# Patient Record
Sex: FEMALE | Race: WHITE | NOT HISPANIC OR LATINO | Employment: FULL TIME | URBAN - METROPOLITAN AREA
[De-identification: names, ages, dates, MRNs, and addresses within clinical notes are randomized per-mention and may not be internally consistent; named-entity substitution may affect disease eponyms.]

---

## 2017-02-27 ENCOUNTER — OFFICE VISIT (OUTPATIENT)
Dept: FAMILY MEDICINE CLINIC | Facility: CLINIC | Age: 37
End: 2017-02-27

## 2017-02-27 VITALS
HEIGHT: 68 IN | HEART RATE: 88 BPM | WEIGHT: 293 LBS | BODY MASS INDEX: 44.41 KG/M2 | DIASTOLIC BLOOD PRESSURE: 110 MMHG | OXYGEN SATURATION: 95 % | SYSTOLIC BLOOD PRESSURE: 170 MMHG

## 2017-02-27 DIAGNOSIS — F41.9 ANXIETY: ICD-10-CM

## 2017-02-27 DIAGNOSIS — N94.6 DYSMENORRHEA: ICD-10-CM

## 2017-02-27 DIAGNOSIS — I10 ESSENTIAL HYPERTENSION: Primary | ICD-10-CM

## 2017-02-27 LAB
ALBUMIN SERPL-MCNC: 4.2 G/DL (ref 3.2–4.8)
ALBUMIN/GLOB SERPL: 1.5 G/DL (ref 1.5–2.5)
ALP SERPL-CCNC: 64 U/L (ref 25–100)
ALT SERPL W P-5'-P-CCNC: 9 U/L (ref 7–40)
ANION GAP SERPL CALCULATED.3IONS-SCNC: 7 MMOL/L (ref 3–11)
AST SERPL-CCNC: 12 U/L (ref 0–33)
BASOPHILS # BLD AUTO: 0.06 10*3/MM3 (ref 0–0.2)
BASOPHILS NFR BLD AUTO: 0.6 % (ref 0–1)
BILIRUB SERPL-MCNC: 0.2 MG/DL (ref 0.3–1.2)
BUN BLD-MCNC: 12 MG/DL (ref 9–23)
BUN/CREAT SERPL: 15 (ref 7–25)
CALCIUM SPEC-SCNC: 9.6 MG/DL (ref 8.7–10.4)
CHLORIDE SERPL-SCNC: 105 MMOL/L (ref 99–109)
CO2 SERPL-SCNC: 28 MMOL/L (ref 20–31)
CREAT BLD-MCNC: 0.8 MG/DL (ref 0.6–1.3)
DEPRECATED RDW RBC AUTO: 44.8 FL (ref 37–54)
EOSINOPHIL # BLD AUTO: 0.43 10*3/MM3 (ref 0.1–0.3)
EOSINOPHIL NFR BLD AUTO: 4.2 % (ref 0–3)
ERYTHROCYTE [DISTWIDTH] IN BLOOD BY AUTOMATED COUNT: 12.9 % (ref 11.3–14.5)
GFR SERPL CREATININE-BSD FRML MDRD: 81 ML/MIN/1.73
GLOBULIN UR ELPH-MCNC: 2.8 GM/DL
GLUCOSE BLD-MCNC: 81 MG/DL (ref 70–100)
HBA1C MFR BLD: 4.8 % (ref 4.8–5.6)
HCT VFR BLD AUTO: 41.9 % (ref 34.5–44)
HGB BLD-MCNC: 14.3 G/DL (ref 11.5–15.5)
IMM GRANULOCYTES # BLD: 0.02 10*3/MM3 (ref 0–0.03)
IMM GRANULOCYTES NFR BLD: 0.2 % (ref 0–0.6)
LYMPHOCYTES # BLD AUTO: 1.99 10*3/MM3 (ref 0.6–4.8)
LYMPHOCYTES NFR BLD AUTO: 19.5 % (ref 24–44)
MCH RBC QN AUTO: 32.6 PG (ref 27–31)
MCHC RBC AUTO-ENTMCNC: 34.1 G/DL (ref 32–36)
MCV RBC AUTO: 95.7 FL (ref 80–99)
MONOCYTES # BLD AUTO: 0.59 10*3/MM3 (ref 0–1)
MONOCYTES NFR BLD AUTO: 5.8 % (ref 0–12)
NEUTROPHILS # BLD AUTO: 7.14 10*3/MM3 (ref 1.5–8.3)
NEUTROPHILS NFR BLD AUTO: 69.7 % (ref 41–71)
PLATELET # BLD AUTO: 269 10*3/MM3 (ref 150–450)
PMV BLD AUTO: 9.2 FL (ref 6–12)
POTASSIUM BLD-SCNC: 4 MMOL/L (ref 3.5–5.5)
PROT SERPL-MCNC: 7 G/DL (ref 5.7–8.2)
RBC # BLD AUTO: 4.38 10*6/MM3 (ref 3.89–5.14)
SODIUM BLD-SCNC: 140 MMOL/L (ref 132–146)
TSH SERPL DL<=0.05 MIU/L-ACNC: 1.71 MIU/ML (ref 0.35–5.35)
URATE SERPL-MCNC: 5.3 MG/DL (ref 3.1–7.8)
WBC NRBC COR # BLD: 10.23 10*3/MM3 (ref 3.5–10.8)

## 2017-02-27 PROCEDURE — 80053 COMPREHEN METABOLIC PANEL: CPT | Performed by: FAMILY MEDICINE

## 2017-02-27 PROCEDURE — 85025 COMPLETE CBC W/AUTO DIFF WBC: CPT | Performed by: FAMILY MEDICINE

## 2017-02-27 PROCEDURE — 83036 HEMOGLOBIN GLYCOSYLATED A1C: CPT | Performed by: FAMILY MEDICINE

## 2017-02-27 PROCEDURE — 36415 COLL VENOUS BLD VENIPUNCTURE: CPT | Performed by: FAMILY MEDICINE

## 2017-02-27 PROCEDURE — 84443 ASSAY THYROID STIM HORMONE: CPT | Performed by: FAMILY MEDICINE

## 2017-02-27 PROCEDURE — 99214 OFFICE O/P EST MOD 30 MIN: CPT | Performed by: FAMILY MEDICINE

## 2017-02-27 PROCEDURE — 84550 ASSAY OF BLOOD/URIC ACID: CPT | Performed by: FAMILY MEDICINE

## 2017-02-27 RX ORDER — HYDROXYZINE PAMOATE 50 MG/1
50 CAPSULE ORAL 3 TIMES DAILY PRN
Qty: 30 CAPSULE | Refills: 1 | Status: SHIPPED | OUTPATIENT
Start: 2017-02-27 | End: 2017-04-02 | Stop reason: SDUPTHER

## 2017-02-27 RX ORDER — NIFEDIPINE 30 MG/1
30 TABLET, EXTENDED RELEASE ORAL DAILY
Qty: 90 TABLET | Refills: 1 | Status: SHIPPED | OUTPATIENT
Start: 2017-02-27 | End: 2017-03-27 | Stop reason: SDUPTHER

## 2017-02-27 RX ORDER — LOSARTAN POTASSIUM AND HYDROCHLOROTHIAZIDE 25; 100 MG/1; MG/1
1 TABLET ORAL DAILY
Qty: 90 TABLET | Refills: 2 | Status: SHIPPED | OUTPATIENT
Start: 2017-02-27 | End: 2017-12-06 | Stop reason: SDUPTHER

## 2017-02-27 NOTE — PROGRESS NOTES
Subjective   Starr Ruiz is a 36 y.o. female. Pt is here to due to being out of medications for the last 3 month atleast. Gyno switched pt blood pressure medication and she is not sure what medication she was takingt.     History of Present Illness Pt states that she feels anxious all the time.  She has had to leave multiple places due to inability to control emotions and SOB.  Feels out of control and agitated.  This is a daily occurrence and says she just wants to be left alone. Increase in emotional and family stressors.  Kids at the moment ages 12, 13, 15.  15 year old female just left [place] for behavioral issues and aggression toward mom.  Other with behavioral issues at school.  Sleeps 3-4 hours at night.   At least one headache 8/10 daily lasting approximately 2-3 hours.  Noise and light make it worse.  Tylenol 1000 mg does not help.  Has been off BP meds for 3 months.  Prev took losartan and eldon well.  Had cough with ace Inh.      Dr Giron performed Novasure Ablation in March 2016.  He was managing BP prior to surgery, but pt unable to fu there due to insurance coverage changing.  Vaginal bleeding has resolved.  Has taken hydrocodone, naproxen, and other left over medications from previous surgeries but has no medication left for pain control.  Feels like anxiety is worse during her cycle.  Smoking more frequently.  Has Ventolin inhaler she takes for occ SOB.  Weight gain due to overeating with stress.  No si/hi.     She also co lower abd crampin after ablation, however change in insurance and she can no longer fu with Valudis.  Would like new GYN.     The following portions of the patient's history were reviewed and updated as appropriate: allergies, current medications, past family history, past medical history, past social history, past surgical history and problem list.    Review of Systems   Constitutional: Positive for appetite change.   HENT: Negative.    Eyes: Negative.    Respiratory:  "Positive for shortness of breath.    Cardiovascular: Negative.  Negative for chest pain and palpitations.   Gastrointestinal: Negative.    Endocrine: Negative.    Genitourinary: Negative.    Musculoskeletal: Negative.    Skin: Negative.    Allergic/Immunologic: Negative.    Neurological: Negative.    Hematological: Negative.    Psychiatric/Behavioral: Positive for agitation and sleep disturbance. Negative for behavioral problems, confusion, dysphoric mood, self-injury and suicidal ideas. The patient is nervous/anxious.    All other systems reviewed and are negative.      Objective     Vitals:    02/27/17 1424   BP: (!) 170/110   Pulse: 88   SpO2: 95%   Weight: (!) 356 lb (161 kg)   Height: 68\" (172.7 cm)       Physical Exam   Constitutional: She is oriented to person, place, and time. She appears well-developed and well-nourished.   HENT:   Head: Normocephalic and atraumatic.   Eyes: EOM are normal. Pupils are equal, round, and reactive to light. Right eye exhibits no discharge. Left eye exhibits no discharge.   Neck: Normal range of motion. Neck supple.   Cardiovascular: Normal rate, regular rhythm, normal heart sounds and intact distal pulses.    Pulmonary/Chest: Effort normal and breath sounds normal.   Abdominal: Soft. Bowel sounds are normal. She exhibits no mass. There is no tenderness.   Musculoskeletal: Normal range of motion.        Right shoulder: She exhibits no swelling.   Neurological: She is alert and oriented to person, place, and time. She has normal reflexes.   Skin: Skin is warm and dry. No cyanosis. Nails show no clubbing.   Psychiatric: She has a normal mood and affect. Her behavior is normal. Judgment and thought content normal.       Assessment/Plan     Problem List Items Addressed This Visit        Cardiovascular and Mediastinum    Hypertension - Primary    Relevant Medications    losartan-hydrochlorothiazide (HYZAAR) 100-25 MG per tablet    NIFEdipine XL (PROCARDIA XL) 30 MG 24 hr tablet    " Other Relevant Orders    CBC & Differential    TSH    Comprehensive Metabolic Panel    Hemoglobin A1c    Uric Acid    CBC Auto Differential       Other    Anxiety    Relevant Medications    sertraline (ZOLOFT) 50 MG tablet    hydrOXYzine (VISTARIL) 50 MG capsule    Other Relevant Orders    TSH      Other Visit Diagnoses     Dysmenorrhea        Relevant Orders    Ambulatory Referral to Gynecology

## 2017-02-27 NOTE — PATIENT INSTRUCTIONS

## 2017-03-27 ENCOUNTER — OFFICE VISIT (OUTPATIENT)
Dept: FAMILY MEDICINE CLINIC | Facility: CLINIC | Age: 37
End: 2017-03-27

## 2017-03-27 VITALS
OXYGEN SATURATION: 97 % | BODY MASS INDEX: 44.41 KG/M2 | WEIGHT: 293 LBS | HEART RATE: 89 BPM | TEMPERATURE: 98.2 F | HEIGHT: 68 IN | DIASTOLIC BLOOD PRESSURE: 98 MMHG | SYSTOLIC BLOOD PRESSURE: 148 MMHG

## 2017-03-27 DIAGNOSIS — E66.01 MORBID OBESITY DUE TO EXCESS CALORIES (HCC): ICD-10-CM

## 2017-03-27 DIAGNOSIS — F41.9 ANXIETY: ICD-10-CM

## 2017-03-27 DIAGNOSIS — Z72.0 TOBACCO ABUSE: ICD-10-CM

## 2017-03-27 DIAGNOSIS — I10 ESSENTIAL HYPERTENSION: Primary | ICD-10-CM

## 2017-03-27 DIAGNOSIS — N94.6 DYSMENORRHEA: ICD-10-CM

## 2017-03-27 PROCEDURE — 99215 OFFICE O/P EST HI 40 MIN: CPT | Performed by: FAMILY MEDICINE

## 2017-03-27 PROCEDURE — 99406 BEHAV CHNG SMOKING 3-10 MIN: CPT | Performed by: FAMILY MEDICINE

## 2017-03-27 PROCEDURE — 90471 IMMUNIZATION ADMIN: CPT | Performed by: FAMILY MEDICINE

## 2017-03-27 PROCEDURE — 90732 PPSV23 VACC 2 YRS+ SUBQ/IM: CPT | Performed by: FAMILY MEDICINE

## 2017-03-27 PROCEDURE — 4040F PNEUMOC VAC/ADMIN/RCVD: CPT | Performed by: FAMILY MEDICINE

## 2017-03-27 RX ORDER — SERTRALINE HYDROCHLORIDE 100 MG/1
100 TABLET, FILM COATED ORAL DAILY
Qty: 90 TABLET | Refills: 3 | Status: SHIPPED | OUTPATIENT
Start: 2017-03-27 | End: 2017-12-27

## 2017-03-27 RX ORDER — NIFEDIPINE 60 MG/1
60 TABLET, FILM COATED, EXTENDED RELEASE ORAL DAILY
Qty: 90 TABLET | Refills: 3 | Status: SHIPPED | OUTPATIENT
Start: 2017-03-27 | End: 2017-12-27 | Stop reason: SDUPTHER

## 2017-03-27 RX ORDER — NAPROXEN 500 MG/1
500 TABLET ORAL 2 TIMES DAILY WITH MEALS
Qty: 60 TABLET | Refills: 2 | Status: SHIPPED | OUTPATIENT
Start: 2017-03-27 | End: 2018-10-31

## 2017-03-27 NOTE — PATIENT INSTRUCTIONS
Steps to Quit Smoking   Smoking tobacco can be harmful to your health and can affect almost every organ in your body. Smoking puts you, and those around you, at risk for developing many serious chronic diseases. Quitting smoking is difficult, but it is one of the best things that you can do for your health. It is never too late to quit.  WHAT ARE THE BENEFITS OF QUITTING SMOKING?  When you quit smoking, you lower your risk of developing serious diseases and conditions, such as:  · Lung cancer or lung disease, such as COPD.  · Heart disease.  · Stroke.  · Heart attack.  · Infertility.  · Osteoporosis and bone fractures.  Additionally, symptoms such as coughing, wheezing, and shortness of breath may get better when you quit. You may also find that you get sick less often because your body is stronger at fighting off colds and infections. If you are pregnant, quitting smoking can help to reduce your chances of having a baby of low birth weight.  HOW DO I GET READY TO QUIT?  When you decide to quit smoking, create a plan to make sure that you are successful. Before you quit:  · Pick a date to quit. Set a date within the next two weeks to give you time to prepare.  · Write down the reasons why you are quitting. Keep this list in places where you will see it often, such as on your bathroom mirror or in your car or wallet.  · Identify the people, places, things, and activities that make you want to smoke (triggers) and avoid them. Make sure to take these actions:    Throw away all cigarettes at home, at work, and in your car.    Throw away smoking accessories, such as ashtrays and lighters.    Clean your car and make sure to empty the ashtray.    Clean your home, including curtains and carpets.  · Tell your family, friends, and coworkers that you are quitting. Support from your loved ones can make quitting easier.  · Talk with your health care provider about your options for quitting smoking.  · Find out what treatment  "options are covered by your health insurance.  WHAT STRATEGIES CAN I USE TO QUIT SMOKING?   Talk with your healthcare provider about different strategies to quit smoking. Some strategies include:  · Quitting smoking altogether instead of gradually lessening how much you smoke over a period of time. Research shows that quitting \"cold turkey\" is more successful than gradually quitting.  · Attending in-person counseling to help you build problem-solving skills. You are more likely to have success in quitting if you attend several counseling sessions. Even short sessions of 10 minutes can be effective.  · Finding resources and support systems that can help you to quit smoking and remain smoke-free after you quit. These resources are most helpful when you use them often. They can include:    Online chats with a counselor.    Telephone quitlines.    Printed self-help materials.    Support groups or group counseling.    Text messaging programs.    Mobile phone applications.  · Taking medicines to help you quit smoking. (If you are pregnant or breastfeeding, talk with your health care provider first.) Some medicines contain nicotine and some do not. Both types of medicines help with cravings, but the medicines that include nicotine help to relieve withdrawal symptoms. Your health care provider may recommend:    Nicotine patches, gum, or lozenges.    Nicotine inhalers or sprays.    Non-nicotine medicine that is taken by mouth.  Talk with your health care provider about combining strategies, such as taking medicines while you are also receiving in-person counseling. Using these two strategies together makes you more likely to succeed in quitting than if you used either strategy on its own.  If you are pregnant or breastfeeding, talk with your health care provider about finding counseling or other support strategies to quit smoking. Do not take medicine to help you quit smoking unless told to do so by your health care " provider.  WHAT THINGS CAN I DO TO MAKE IT EASIER TO QUIT?  Quitting smoking might feel overwhelming at first, but there is a lot that you can do to make it easier. Take these important actions:  · Reach out to your family and friends and ask that they support and encourage you during this time. Call telephone quitlines, reach out to support groups, or work with a counselor for support.  · Ask people who smoke to avoid smoking around you.  · Avoid places that trigger you to smoke, such as bars, parties, or smoke-break areas at work.  · Spend time around people who do not smoke.  · Lessen stress in your life, because stress can be a smoking trigger for some people. To lessen stress, try:    Exercising regularly.    Deep-breathing exercises.    Yoga.    Meditating.    Performing a body scan. This involves closing your eyes, scanning your body from head to toe, and noticing which parts of your body are particularly tense. Purposefully relax the muscles in those areas.  · Download or purchase mobile phone or tablet apps (applications) that can help you stick to your quit plan by providing reminders, tips, and encouragement. There are many free apps, such as QuitGuide from the CDC (Centers for Disease Control and Prevention). You can find other support for quitting smoking (smoking cessation) through smokefree.gov and other websites.  HOW WILL I FEEL WHEN I QUIT SMOKING?  Within the first 24 hours of quitting smoking, you may start to feel some withdrawal symptoms. These symptoms are usually most noticeable 2-3 days after quitting, but they usually do not last beyond 2-3 weeks. Changes or symptoms that you might experience include:  · Mood swings.  · Restlessness, anxiety, or irritation.  · Difficulty concentrating.  · Dizziness.  · Strong cravings for sugary foods in addition to nicotine.  · Mild weight gain.  · Constipation.  · Nausea.  · Coughing or a sore throat.  · Changes in how your medicines work in your  body.  · A depressed mood.  · Difficulty sleeping (insomnia).  After the first 2-3 weeks of quitting, you may start to notice more positive results, such as:  · Improved sense of smell and taste.  · Decreased coughing and sore throat.  · Slower heart rate.  · Lower blood pressure.  · Clearer skin.  · The ability to breathe more easily.  · Fewer sick days.  Quitting smoking is very challenging for most people. Do not get discouraged if you are not successful the first time. Some people need to make many attempts to quit before they achieve long-term success. Do your best to stick to your quit plan, and talk with your health care provider if you have any questions or concerns.     This information is not intended to replace advice given to you by your health care provider. Make sure you discuss any questions you have with your health care provider.     Document Released: 12/12/2002 Document Revised: 05/03/2016 Document Reviewed: 05/03/2016  [employer] Patient Education ©2016 [employer]  Obesity, Adult  Obesity is the condition of having too much total body fat. Being overweight or obese means that your weight is greater than what is considered healthy for your body size. Obesity is determined by a measurement called BMI. BMI is an estimate of body fat and is calculated from height and weight. For adults, a BMI of 30 or higher is considered obese.  Obesity can eventually lead to other health concerns and major illnesses, including:  · Stroke.  · Coronary artery disease (CAD).  · Type 2 diabetes.  · Some types of cancer, including cancers of the colon, breast, uterus, and gallbladder.  · Osteoarthritis.  · High blood pressure (hypertension).  · High cholesterol.  · Sleep apnea.  · Gallbladder stones.  · Infertility problems.   CAUSES  The main cause of obesity is taking in (consuming) more calories than your body uses for energy. Other factors that contribute to this condition may include:  · Being born  with genes that make you more likely to become obese.  · Having a medical condition that causes obesity. These conditions include:    Hypothyroidism.    Polycystic ovarian syndrome (PCOS).    Binge-eating disorder.    Cushing syndrome.  · Taking certain medicines, such as steroids, antidepressants, and seizure medicines.  · Not being physically active (sedentary lifestyle).  · Living where there are limited places to exercise safely or buy healthy foods.  · Not getting enough sleep.  RISK FACTORS  The following factors may increase your risk of this condition:  · Having a family history of obesity.  · Being a woman of -American descent.  · Being a man of  descent.  SYMPTOMS  Having excessive body fat is the main symptom of this condition.  DIAGNOSIS  This condition may be diagnosed based on:  · Your symptoms.  · Your medical history.  · A physical exam. Your health care provider may measure:    Your BMI. If you are an adult with a BMI between 25 and less than 30, you are considered overweight. If you are an adult with a BMI of 30 or higher, you are considered obese.    The distances around your hips and your waist (circumferences). These may be compared to each other to help diagnose your condition.    Your skinfold thickness. Your health care provider may gently pinch a fold of your skin and measure it.  TREATMENT  Treatment for this condition often includes changing your lifestyle. Treatment may include some or all of the following:  · Dietary changes. Work with your health care provider and a dietitian to set a weight-loss goal that is healthy and reasonable for you. Dietary changes may include eating:    Smaller portions. A portion size is the amount of a particular food that is healthy for you to eat at one time. This varies from person to person.    Low-calorie or low-fat options.    More whole grains, fruits, and vegetables.  · Regular physical activity. This may include aerobic activity  (cardio) and strength training.  · Medicine to help you lose weight. Your health care provider may prescribe medicine if you are unable to lose 1 pound a week after 6 weeks of eating more healthily and doing more physical activity.  · Surgery. Surgical options may include gastric banding and gastric bypass. Surgery may be done if:    Other treatments have not helped to improve your condition.    You have a BMI of 40 or higher.    You have life-threatening health problems related to obesity.  HOME CARE INSTRUCTIONS  Eating and Drinking  · Follow recommendations from your health care provider about what you eat and drink. Your health care provider may advise you to:    Limit fast foods, sweets, and processed snack foods.    Choose low-fat options, such as low-fat milk instead of whole milk.    Eat 5 or more servings of fruits or vegetables every day.    Eat at home more often. This gives you more control over what you eat.    Choose healthy foods when you eat out.    Learn what a healthy portion size is.    Keep low-fat snacks on hand.    Avoid sugary drinks, such as soda, fruit juice, iced tea sweetened with sugar, and flavored milk.    Eat a healthy breakfast.  · Drink enough water to keep your urine clear or pale yellow.  · Do not go without eating for long periods of time (do not fast) or follow a fad diet. Fasting and fad diets can be unhealthy and even dangerous.  Physical Activity  · Exercise regularly, as told by your health care provider. Ask your health care provider what types of exercise are safe for you and how often you should exercise.  · Warm up and stretch before being active.  · Cool down and stretch after being active.  · Rest between periods of activity.  Lifestyle  · Limit the time that you spend in front of your TV, computer, or video game system.  · Find ways to reward yourself that do not involve food.  · Limit alcohol intake to no more than 1 drink a day for nonpregnant women and 2 drinks a  day for men. One drink equals 12 oz of beer, 5 oz of wine, or 1½ oz of hard liquor.  General Instructions  · Keep a weight loss journal to keep track of the food you eat and how much you exercise you get.  · Take over-the-counter and prescription medicines only as told by your health care provider.  · Take vitamins and supplements only as told by your health care provider.  · Consider joining a support group. Your health care provider may be able to recommend a support group.  · Keep all follow-up visits as told by your health care provider. This is important.  SEEK MEDICAL CARE IF:  · You are unable to meet your weight loss goal after 6 weeks of dietary and lifestyle changes.     This information is not intended to replace advice given to you by your health care provider. Make sure you discuss any questions you have with your health care provider.     Document Released: 01/25/2006 Document Revised: 01/13/2017 Document Reviewed: 10/05/2016  [employer] Interactive Patient Education ©2016 [employer]    Dysmenorrhea  Menstrual cramps (dysmenorrhea) are caused by the muscles of the uterus tightening (iker) during a menstrual period. For some women, this discomfort is merely bothersome. For others, dysmenorrhea can be severe enough to interfere with everyday activities for a few days each month.  Primary dysmenorrhea is menstrual cramps that last a couple of days when you start having menstrual periods or soon after. This often begins after a teenager starts having her period. As a woman gets older or has a baby, the cramps will usually lessen or disappear. Secondary dysmenorrhea begins later in life, lasts longer, and the pain may be stronger than primary dysmenorrhea. The pain may start before the period and last a few days after the period.   CAUSES   Dysmenorrhea is usually caused by an underlying problem, such as:  · The tissue lining the uterus grows outside of the uterus in other areas of the body  (endometriosis).  · The endometrial tissue, which normally lines the uterus, is found in or grows into the muscular walls of the uterus (adenomyosis).  · The pelvic blood vessels are engorged with blood just before the menstrual period (pelvic congestive syndrome).  · Overgrowth of cells (polyps) in the lining of the uterus or cervix.  · Falling down of the uterus (prolapse) because of loose or stretched ligaments.  · Depression.  · Bladder problems, infection, or inflammation.  · Problems with the intestine, a tumor, or irritable bowel syndrome.  · Cancer of the female organs or bladder.  · A severely tipped uterus.  · A very tight opening or closed cervix.  · Noncancerous tumors of the uterus (fibroids).  · Pelvic inflammatory disease (PID).  · Pelvic scarring (adhesions) from a previous surgery.  · Ovarian cyst.  · An intrauterine device (IUD) used for birth control.  RISK FACTORS  You may be at greater risk of dysmenorrhea if:  · You are younger than age 30.  · You started puberty early.  · You have irregular or heavy bleeding.  · You have never given birth.  · You have a family history of this problem.  · You are a smoker.  SIGNS AND SYMPTOMS   · Cramping or throbbing pain in your lower abdomen.  · Headaches.  · Lower back pain.  · Nausea or vomiting.  · Diarrhea.  · Sweating or dizziness.  · Loose stools.  DIAGNOSIS   A diagnosis is based on your history, symptoms, physical exam, diagnostic tests, or procedures. Diagnostic tests or procedures may include:  · Blood tests.  · Ultrasonography.  · An examination of the lining of the uterus (dilation and curettage, D&C).  · An examination inside your abdomen or pelvis with a scope (laparoscopy).  · X-rays.  · CT scan.  · MRI.  · An examination inside the bladder with a scope (cystoscopy).  · An examination inside the intestine or stomach with a scope (colonoscopy, gastroscopy).  TREATMENT   Treatment depends on the cause of the dysmenorrhea. Treatment may  include:  · Pain medicine prescribed by your health care provider.  · Birth control pills or an IUD with progesterone hormone in it.  · Hormone replacement therapy.  · Nonsteroidal anti-inflammatory drugs (NSAIDs). These may help stop the production of prostaglandins.  · Surgery to remove adhesions, endometriosis, ovarian cyst, or fibroids.  · Removal of the uterus (hysterectomy).  · Progesterone shots to stop the menstrual period.  · Cutting the nerves on the sacrum that go to the female organs (presacral neurectomy).  · Electric current to the sacral nerves (sacral nerve stimulation).  · Antidepressant medicine.  · Psychiatric therapy, counseling, or group therapy.  · Exercise and physical therapy.  · Meditation and yoga therapy.  · Acupuncture.  HOME CARE INSTRUCTIONS   · Only take over-the-counter or prescription medicines as directed by your health care provider.  · Place a heating pad or hot water bottle on your lower back or abdomen. Do not sleep with the heating pad.  · Use aerobic exercises, walking, swimming, biking, and other exercises to help lessen the cramping.  · Massage to the lower back or abdomen may help.  · Stop smoking.  · Avoid alcohol and caffeine.  SEEK MEDICAL CARE IF:   · Your pain does not get better with medicine.  · You have pain with sexual intercourse.  · Your pain increases and is not controlled with medicines.  · You have abnormal vaginal bleeding with your period.  · You develop nausea or vomiting with your period that is not controlled with medicine.  SEEK IMMEDIATE MEDICAL CARE IF:   You pass out.      This information is not intended to replace advice given to you by your health care provider. Make sure you discuss any questions you have with your health care provider.     Document Released: 12/18/2006 Document Revised: 08/20/2014 Document Reviewed: 06/05/2014  [employer] Interactive Patient Education ©2016 [employer]    Pneumococcal Vaccine, Polyvalent solution for  injection  What is this medicine?  PNEUMOCOCCAL VACCINE, POLYVALENT (ILIANA mo ASIM al myriam SEEN, maryann hampton) is a vaccine to prevent pneumococcus bacteria infection. These bacteria are a major cause of ear infections, Strep throat infections, and serious pneumonia, meningitis, or blood infections worldwide. These vaccines help the body to produce antibodies (protective substances) that help your body defend against these bacteria. This vaccine is recommended for people 2 years of age and older with health problems. It is also recommended for all adults over 50 years old. This vaccine will not treat an infection.  This medicine may be used for other purposes; ask your health care provider or pharmacist if you have questions.  COMMON BRAND NAME(S): Pneumovax 23  What should I tell my health care provider before I take this medicine?  They need to know if you have any of these conditions:  -bleeding problems  -bone marrow or organ transplant  -cancer, Hodgkin's disease  -fever  -infection  -immune system problems  -low platelet count in the blood  -seizures  -an unusual or allergic reaction to pneumococcal vaccine, diphtheria toxoid, other vaccines, latex, other medicines, foods, dyes, or preservatives  -pregnant or trying to get pregnant  -breast-feeding  How should I use this medicine?  This vaccine is for injection into a muscle or under the skin. It is given by a health care professional.  A copy of Vaccine Information Statements will be given before each vaccination. Read this sheet carefully each time. The sheet may change frequently.  Talk to your pediatrician regarding the use of this medicine in children. While this drug may be prescribed for children as young as 2 years of age for selected conditions, precautions do apply.  Overdosage: If you think you have taken too much of this medicine contact a poison control center or emergency room at once.  NOTE: This medicine is only for you. Do not share this  medicine with others.  What if I miss a dose?  It is important not to miss your dose. Call your doctor or health care professional if you are unable to keep an appointment.  What may interact with this medicine?  -medicines for cancer chemotherapy  -medicines that suppress your immune function  -medicines that treat or prevent blood clots like warfarin, enoxaparin, and dalteparin  -steroid medicines like prednisone or cortisone  This list may not describe all possible interactions. Give your health care provider a list of all the medicines, herbs, non-prescription drugs, or dietary supplements you use. Also tell them if you smoke, drink alcohol, or use illegal drugs. Some items may interact with your medicine.  What should I watch for while using this medicine?  Mild fever and pain should go away in 3 days or less. Report any unusual symptoms to your doctor or health care professional.  What side effects may I notice from receiving this medicine?  Side effects that you should report to your doctor or health care professional as soon as possible:  -allergic reactions like skin rash, itching or hives, swelling of the face, lips, or tongue  -breathing problems  -confused  -fever over 102 degrees F  -pain, tingling, numbness in the hands or feet  -seizures  -unusual bleeding or bruising  -unusual muscle weakness  Side effects that usually do not require medical attention (report to your doctor or health care professional if they continue or are bothersome):  -aches and pains  -diarrhea  -fever of 102 degrees F or less  -headache  -irritable  -loss of appetite  -pain, tender at site where injected  -trouble sleeping  This list may not describe all possible side effects. Call your doctor for medical advice about side effects. You may report side effects to FDA at 3-896-FDA-5627.  Where should I keep my medicine?  This does not apply. This vaccine is given in a clinic, pharmacy, doctor's office, or other health care  setting and will not be stored at home.  NOTE: This sheet is a summary. It may not cover all possible information. If you have questions about this medicine, talk to your doctor, pharmacist, or health care provider.     © 2017, [employer]. (2009-07-24 14:32:37)

## 2017-03-27 NOTE — PROGRESS NOTES
Duy Ruiz is a 36 y.o. female.  Pt is here to fu on htn. Pt has not been monitoring home bp. Pt has yet to take bp medication today.    Hypertension   This is a chronic problem. The current episode started more than 1 month ago. The problem has been gradually improving since onset. The problem is uncontrolled. Pertinent negatives include no anxiety, blurred vision, chest pain, headaches, malaise/fatigue, neck pain, orthopnea, palpitations, peripheral edema, PND, shortness of breath or sweats. There are no associated agents to hypertension. Risk factors for coronary artery disease include sedentary lifestyle. The current treatment provides no improvement. There is no history of angina, kidney disease, CAD/MI, CVA, heart failure, left ventricular hypertrophy, PVD, renovascular disease, retinopathy or a thyroid problem. There is no history of chronic renal disease, coarctation of the aorta, hyperaldosteronism, hypercortisolism, hyperparathyroidism, a hypertension causing med, pheochromocytoma or sleep apnea.     Pt has not taken her blood pressure medicine this morning.  She usually takes it after she eats breakfast due to routine.  Pt was started on nifedipine 30 mg last visit. Pt states that she has been getting lightheaded since she started the med upon standing and with bending over and standing.  No falls.  No other med se.  Headaches have improved since starting meds.       Pt has never been evaluated for sleep apnea.  Pt states her boyfriend tells her she snores.  Pt was supposed to call gyn and set up an appt since had novasure ablation performed in March 2016.  Bleeding is better, but cramps are significantly worse for 4-5 days out of cycle.  Pamprin and IBU 3200mg daily.  Nothing helping.  Cramps are waking her up from sleep.  Heat does not help.  Has had cysts and abnormal paps in the past.  Was given the choice of hysterectomy or novasure, but was unsure if wanted more children.  "    Has been family stressors with sons.  Feels tired and anhedonia.      The following portions of the patient's history were reviewed and updated as appropriate: allergies, current medications, past family history, past medical history, past social history, past surgical history and problem list.    Review of Systems   Constitutional: Negative.  Negative for malaise/fatigue.   HENT: Negative.    Eyes: Negative.  Negative for blurred vision.   Respiratory: Negative.  Negative for shortness of breath.    Cardiovascular: Negative.  Negative for chest pain, palpitations, orthopnea and PND.   Gastrointestinal: Negative.    Endocrine: Negative.    Genitourinary: Positive for pelvic pain. Negative for menstrual problem.   Musculoskeletal: Negative.  Negative for neck pain.   Skin: Negative.    Allergic/Immunologic: Negative.    Neurological: Negative.  Negative for headaches.   Hematological: Negative.    Psychiatric/Behavioral: Negative.    All other systems reviewed and are negative.      Objective     Vitals:    03/27/17 0818   BP: 148/98   Pulse: 89   Temp: 98.2 °F (36.8 °C)   SpO2: 97%   Weight: (!) 361 lb (164 kg)   Height: 68\" (172.7 cm)       Physical Exam   Constitutional: She is oriented to person, place, and time. She appears well-developed and well-nourished.   HENT:   Head: Normocephalic and atraumatic.   Eyes: EOM are normal. Pupils are equal, round, and reactive to light. Right eye exhibits no discharge. Left eye exhibits no discharge.   Neck: Normal range of motion. Neck supple. No thyromegaly present.   Cardiovascular: Normal rate, regular rhythm, normal heart sounds and intact distal pulses.    Pulmonary/Chest: Effort normal and breath sounds normal. No respiratory distress. She has no wheezes. She has no rales.   Abdominal: Soft. Bowel sounds are normal. She exhibits no mass. There is no tenderness.   Musculoskeletal: Normal range of motion.        Right shoulder: She exhibits no swelling. "   Neurological: She is alert and oriented to person, place, and time. She has normal reflexes.   Skin: Skin is warm and dry. No cyanosis. Nails show no clubbing.   Psychiatric: She has a normal mood and affect. Her behavior is normal. Judgment and thought content normal.   Nursing note and vitals reviewed.      Assessment/Plan     Problem List Items Addressed This Visit        Cardiovascular and Mediastinum    Hypertension - Primary    Relevant Medications    NIFEdipine XL (ADALAT CC) 60 MG 24 hr tablet       Digestive    Morbid obesity due to excess calories    Current Assessment & Plan     Obesity is unchanged.  Discussed the patient's BMI.  The BMI is above average; BMI management plan is completed.  General weight loss/lifestyle modification strategies discussed (elicit support from others; identify saboteurs; non-food rewards, etc).            Other    Anxiety    Relevant Medications    sertraline (ZOLOFT) 100 MG tablet    Other Relevant Orders    Ambulatory Referral to Psychiatry    Tobacco abuse    Relevant Medications    pneumococcal polysaccharide 23-valent (PNEUMOVAX-23) vaccine 0.5 mL (Completed)    Dysmenorrhea    Relevant Medications    naproxen (NAPROSYN) 500 MG tablet    Other Relevant Orders    Ambulatory Referral to Gynecology        Approximately 5 minutes was spent counseling patient on risks and benefits of smoking cessation.  Options for cessation were discussed, including medications and alternative therapies.    Greater than 45 minutes were spent with patient, with greater than 90% of time spent in face-to-face communication.

## 2017-03-27 NOTE — ASSESSMENT & PLAN NOTE
Obesity is unchanged.  Discussed the patient's BMI.  The BMI is above average; BMI management plan is completed.  General weight loss/lifestyle modification strategies discussed (elicit support from others; identify saboteurs; non-food rewards, etc).

## 2017-04-02 DIAGNOSIS — F41.9 ANXIETY: ICD-10-CM

## 2017-04-03 RX ORDER — HYDROXYZINE PAMOATE 50 MG/1
CAPSULE ORAL
Qty: 30 CAPSULE | Refills: 0 | Status: SHIPPED | OUTPATIENT
Start: 2017-04-03 | End: 2017-06-27

## 2017-06-27 ENCOUNTER — OFFICE VISIT (OUTPATIENT)
Dept: FAMILY MEDICINE CLINIC | Facility: CLINIC | Age: 37
End: 2017-06-27

## 2017-06-27 VITALS
OXYGEN SATURATION: 97 % | WEIGHT: 293 LBS | HEART RATE: 107 BPM | SYSTOLIC BLOOD PRESSURE: 126 MMHG | BODY MASS INDEX: 44.41 KG/M2 | DIASTOLIC BLOOD PRESSURE: 84 MMHG | HEIGHT: 68 IN

## 2017-06-27 DIAGNOSIS — F41.9 ANXIETY: ICD-10-CM

## 2017-06-27 DIAGNOSIS — I10 ESSENTIAL HYPERTENSION: Primary | ICD-10-CM

## 2017-06-27 PROCEDURE — 99213 OFFICE O/P EST LOW 20 MIN: CPT | Performed by: FAMILY MEDICINE

## 2017-06-27 RX ORDER — BUSPIRONE HYDROCHLORIDE 5 MG/1
5 TABLET ORAL 3 TIMES DAILY
Qty: 90 TABLET | Refills: 1 | Status: SHIPPED | OUTPATIENT
Start: 2017-06-27 | End: 2017-08-27 | Stop reason: SDUPTHER

## 2017-08-27 DIAGNOSIS — F41.9 ANXIETY: ICD-10-CM

## 2017-08-28 ENCOUNTER — OFFICE VISIT (OUTPATIENT)
Dept: PSYCHIATRY | Facility: CLINIC | Age: 37
End: 2017-08-28

## 2017-08-28 DIAGNOSIS — R45.4 DIFFICULTY CONTROLLING ANGER: ICD-10-CM

## 2017-08-28 DIAGNOSIS — F32.A DEPRESSION, UNSPECIFIED DEPRESSION TYPE: Primary | ICD-10-CM

## 2017-08-28 PROCEDURE — 90791 PSYCH DIAGNOSTIC EVALUATION: CPT | Performed by: PSYCHOLOGIST

## 2017-08-28 RX ORDER — BUSPIRONE HYDROCHLORIDE 5 MG/1
TABLET ORAL
Qty: 90 TABLET | Refills: 0 | Status: SHIPPED | OUTPATIENT
Start: 2017-08-28 | End: 2017-10-02 | Stop reason: SDUPTHER
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
## 2017-10-02 DIAGNOSIS — F41.9 ANXIETY: ICD-10-CM

## 2017-10-02 RX ORDER — BUSPIRONE HYDROCHLORIDE 5 MG/1
TABLET ORAL
Qty: 90 TABLET | Refills: 0 | Status: SHIPPED | OUTPATIENT
Start: 2017-10-02 | End: 2017-11-03 | Stop reason: SDUPTHER

## 2017-11-03 DIAGNOSIS — F41.9 ANXIETY: ICD-10-CM

## 2017-11-03 RX ORDER — BUSPIRONE HYDROCHLORIDE 5 MG/1
TABLET ORAL
Qty: 90 TABLET | Refills: 0 | Status: SHIPPED | OUTPATIENT
Start: 2017-11-03 | End: 2017-12-06 | Stop reason: SDUPTHER

## 2017-12-06 DIAGNOSIS — I10 ESSENTIAL HYPERTENSION: ICD-10-CM

## 2017-12-06 DIAGNOSIS — F41.9 ANXIETY: ICD-10-CM

## 2017-12-06 RX ORDER — BUSPIRONE HYDROCHLORIDE 5 MG/1
TABLET ORAL
Qty: 90 TABLET | Refills: 1 | Status: SHIPPED | OUTPATIENT
Start: 2017-12-06 | End: 2018-02-09 | Stop reason: SDUPTHER

## 2017-12-06 RX ORDER — LOSARTAN POTASSIUM AND HYDROCHLOROTHIAZIDE 25; 100 MG/1; MG/1
TABLET ORAL
Qty: 30 TABLET | Refills: 1 | Status: SHIPPED | OUTPATIENT
Start: 2017-12-06 | End: 2017-12-27 | Stop reason: SDUPTHER

## 2017-12-27 ENCOUNTER — OFFICE VISIT (OUTPATIENT)
Dept: FAMILY MEDICINE CLINIC | Facility: CLINIC | Age: 37
End: 2017-12-27

## 2017-12-27 VITALS
WEIGHT: 293 LBS | HEIGHT: 68 IN | OXYGEN SATURATION: 99 % | HEART RATE: 88 BPM | SYSTOLIC BLOOD PRESSURE: 128 MMHG | DIASTOLIC BLOOD PRESSURE: 86 MMHG | BODY MASS INDEX: 44.41 KG/M2

## 2017-12-27 DIAGNOSIS — I10 ESSENTIAL HYPERTENSION: Primary | ICD-10-CM

## 2017-12-27 DIAGNOSIS — Z23 NEED FOR TDAP VACCINATION: ICD-10-CM

## 2017-12-27 DIAGNOSIS — Z20.2 EXPOSURE TO STD: ICD-10-CM

## 2017-12-27 DIAGNOSIS — R31.9 HEMATURIA, UNSPECIFIED TYPE: ICD-10-CM

## 2017-12-27 DIAGNOSIS — F41.9 ANXIETY: ICD-10-CM

## 2017-12-27 LAB
BILIRUB BLD-MCNC: NEGATIVE MG/DL
CLARITY, POC: CLEAR
COLOR UR: YELLOW
EXPIRATION DATE: ABNORMAL
GLUCOSE UR STRIP-MCNC: NEGATIVE MG/DL
HAV IGM SERPL QL IA: NORMAL
HBV CORE IGM SERPL QL IA: NORMAL
HBV SURFACE AG SERPL QL IA: NORMAL
HCV AB SER DONR QL: NORMAL
HIV1+2 AB SER QL: NORMAL
KETONES UR QL: NEGATIVE
LEUKOCYTE EST, POC: NEGATIVE
Lab: ABNORMAL
NITRITE UR-MCNC: NEGATIVE MG/ML
PH UR: 7 [PH] (ref 5–8)
PROT UR STRIP-MCNC: NEGATIVE MG/DL
RBC # UR STRIP: ABNORMAL /UL
SP GR UR: 1.02 (ref 1–1.03)
UROBILINOGEN UR QL: NORMAL

## 2017-12-27 PROCEDURE — 90471 IMMUNIZATION ADMIN: CPT | Performed by: FAMILY MEDICINE

## 2017-12-27 PROCEDURE — 86592 SYPHILIS TEST NON-TREP QUAL: CPT | Performed by: FAMILY MEDICINE

## 2017-12-27 PROCEDURE — 87086 URINE CULTURE/COLONY COUNT: CPT | Performed by: FAMILY MEDICINE

## 2017-12-27 PROCEDURE — 90715 TDAP VACCINE 7 YRS/> IM: CPT | Performed by: FAMILY MEDICINE

## 2017-12-27 PROCEDURE — 99214 OFFICE O/P EST MOD 30 MIN: CPT | Performed by: FAMILY MEDICINE

## 2017-12-27 PROCEDURE — 80074 ACUTE HEPATITIS PANEL: CPT | Performed by: FAMILY MEDICINE

## 2017-12-27 PROCEDURE — G0432 EIA HIV-1/HIV-2 SCREEN: HCPCS | Performed by: FAMILY MEDICINE

## 2017-12-27 RX ORDER — LOSARTAN POTASSIUM AND HYDROCHLOROTHIAZIDE 25; 100 MG/1; MG/1
1 TABLET ORAL DAILY
Qty: 90 TABLET | Refills: 3 | Status: SHIPPED | OUTPATIENT
Start: 2017-12-27 | End: 2018-08-20 | Stop reason: SDUPTHER

## 2017-12-27 RX ORDER — NIFEDIPINE 60 MG/1
60 TABLET, FILM COATED, EXTENDED RELEASE ORAL DAILY
Qty: 90 TABLET | Refills: 3 | Status: SHIPPED | OUTPATIENT
Start: 2017-12-27 | End: 2018-08-20 | Stop reason: SDUPTHER

## 2017-12-27 NOTE — PROGRESS NOTES
Subjective   Starr Ruiz is a 37 y.o. female.     Pt is here to fu on htn and anxiety.    Pt was referred to phycology at last visit. Pt went once.  Pt has been off zoloft for about 2 months and buspar not as often as before. Pt states if she stays away from crowds and people anxiety under control.    Pt would like std testing done today. No symtpoms just in a new relationship and currently boyfriend was asking.    Hypertension   This is a chronic problem. The current episode started more than 1 year ago. The problem has been gradually improving since onset. The problem is controlled. Associated symptoms include anxiety. Pertinent negatives include no blurred vision, chest pain, headaches, malaise/fatigue, neck pain, orthopnea, palpitations, peripheral edema, PND, shortness of breath or sweats. There are no associated agents to hypertension. Past treatments include angiotensin blockers, calcium channel blockers and diuretics. The current treatment provides moderate improvement. There are no compliance problems.  There is no history of angina, kidney disease, CAD/MI, CVA, heart failure, left ventricular hypertrophy, PVD or retinopathy.   Anxiety   Presents for follow-up visit. Patient reports no chest pain, compulsions, confusion, decreased concentration, feeling of choking, palpitations or shortness of breath. Symptoms occur rarely. The quality of sleep is good. Nighttime awakenings: occasional.       Exposure to STD    The patient's pertinent negatives include no discharge, dyspareunia, dysuria, genital itching, genital lesions or pelvic pain. This is a new problem. The current episode started more than 1 month ago. The vaginal discharge was normal. Pertinent negatives include no abdominal pain, anorexia, diaphoresis, fever, genital odor, rectal pain, sore throat or urinary frequency. She has tried nothing for the symptoms. Risk factors include history of STDs.        The following portions of the patient's  "history were reviewed and updated as appropriate: allergies, current medications, past family history, past medical history, past social history, past surgical history and problem list.    Review of Systems   Constitutional: Negative for diaphoresis, fever and malaise/fatigue.   HENT: Negative for sore throat.    Eyes: Negative for blurred vision.   Respiratory: Negative for shortness of breath.    Cardiovascular: Negative for chest pain, palpitations, orthopnea and PND.   Gastrointestinal: Negative for abdominal pain, anorexia and rectal pain.   Genitourinary: Negative for dyspareunia, dysuria, frequency and pelvic pain.   Musculoskeletal: Negative for neck pain.   Neurological: Negative for headaches.   Psychiatric/Behavioral: Negative for confusion and decreased concentration.       Objective     Vitals:    12/27/17 1353   BP: 128/86   Pulse: 88   SpO2: 99%   Weight: (!) 149 kg (328 lb)   Height: 172.7 cm (68\")       Physical Exam   Constitutional: She is oriented to person, place, and time. She appears well-developed and well-nourished.   HENT:   Head: Normocephalic and atraumatic.   Eyes: EOM are normal. Pupils are equal, round, and reactive to light. Right eye exhibits no discharge. Left eye exhibits no discharge.   Neck: Normal range of motion. Neck supple.   Cardiovascular: Normal rate, regular rhythm, normal heart sounds and intact distal pulses.    Pulmonary/Chest: Effort normal and breath sounds normal.   Abdominal: Soft. Bowel sounds are normal. She exhibits no mass. There is no tenderness.   Musculoskeletal: Normal range of motion.        Right shoulder: She exhibits no swelling.   Neurological: She is alert and oriented to person, place, and time. She has normal reflexes.   Skin: Skin is warm and dry. No cyanosis. Nails show no clubbing.   Psychiatric: She has a normal mood and affect. Her behavior is normal. Judgment and thought content normal.   Nursing note and vitals reviewed.      Assessment/Plan "     Problem List Items Addressed This Visit        Cardiovascular and Mediastinum    Hypertension - Primary    Relevant Medications    NIFEdipine CC (ADALAT CC) 60 MG 24 hr tablet    losartan-hydrochlorothiazide (HYZAAR) 100-25 MG per tablet       Other    Anxiety    Current Assessment & Plan     Sx controlled.  Not currently taking anything.            Exposure to STD    Relevant Orders    HIV-1 / O / 2 Ag / Antibody 4th Generation    RPR    Hepatitis Panel, Acute    POC Urinalysis Dipstick, Automated (Completed)    Need for Tdap vaccination    Relevant Orders    Tdap Vaccine Greater Than or Equal To 6yo IM (Completed)      Other Visit Diagnoses     Hematuria, unspecified type        Relevant Orders    Urine Culture - Urine, Urine, Clean Catch

## 2017-12-27 NOTE — PATIENT INSTRUCTIONS
Tdap Vaccine (Tetanus, Diphtheria and Pertussis): What You Need to Know  1. Why get vaccinated?  Tetanus, diphtheria and pertussis are very serious diseases. Tdap vaccine can protect us from these diseases. And, Tdap vaccine given to pregnant women can protect  babies against pertussis.  TETANUS (Lockjaw) is rare in the [place] today. It causes painful muscle tightening and stiffness, usually all over the body.  · It can lead to tightening of muscles in the head and neck so you can't open your mouth, swallow, or sometimes even breathe. Tetanus kills about 1 out of 10 people who are infected even after receiving the best medical care.  DIPHTHERIA is also rare in the [place] today. It can cause a thick coating to form in the back of the throat.  · It can lead to breathing problems, heart failure, paralysis, and death.  PERTUSSIS (Whooping Cough) causes severe coughing spells, which can cause difficulty breathing, vomiting and disturbed sleep.  · It can also lead to weight loss, incontinence, and rib fractures. Up to 2 in 100 adolescents and 5 in 100 adults with pertussis are hospitalized or have complications, which could include pneumonia or death.  These diseases are caused by bacteria. Diphtheria and pertussis are spread from person to person through secretions from coughing or sneezing. Tetanus enters the body through cuts, scratches, or wounds.  Before vaccines, as many as 200,000 cases of diphtheria, 200,000 cases of pertussis, and hundreds of cases of tetanus, were reported in the [place] each year. Since vaccination began, reports of cases for tetanus and diphtheria have dropped by about 99% and for pertussis by about 80%.  2. Tdap vaccine  Tdap vaccine can protect adolescents and adults from tetanus, diphtheria, and pertussis. One dose of Tdap is routinely given at age 11 or 12. People who did not get Tdap at that age should get it as soon as possible.  Tdap is especially important  for healthcare professionals and anyone having close contact with a baby younger than 12 months.  Pregnant women should get a dose of Tdap during every pregnancy, to protect the  from pertussis. Infants are most at risk for severe, life-threatening complications from pertussis.  Another vaccine, called Td, protects against tetanus and diphtheria, but not pertussis. A Td booster should be given every 10 years. Tdap may be given as one of these boosters if you have never gotten Tdap before. Tdap may also be given after a severe cut or burn to prevent tetanus infection.  Your doctor or the person giving you the vaccine can give you more information.  Tdap may safely be given at the same time as other vaccines.  3. Some people should not get this vaccine  · A person who has ever had a life-threatening allergic reaction after a previous dose of any diphtheria, tetanus or pertussis containing vaccine, OR has a severe allergy to any part of this vaccine, should not get Tdap vaccine. Tell the person giving the vaccine about any severe allergies.  · Anyone who had coma or long repeated seizures within 7 days after a childhood dose of DTP or DTaP, or a previous dose of Tdap, should not get Tdap, unless a cause other than the vaccine was found. They can still get Td.  · Talk to your doctor if you:    have seizures or another nervous system problem,    had severe pain or swelling after any vaccine containing diphtheria, tetanus or pertussis,    ever had a condition called Guillain-Barré Syndrome (GBS),    aren't feeling well on the day the shot is scheduled.  4. Risks  With any medicine, including vaccines, there is a chance of side effects. These are usually mild and go away on their own. Serious reactions are also possible but are rare.  Most people who get Tdap vaccine do not have any problems with it.  Mild problems following Tdap  (Did not interfere with activities)  · Pain where the shot was given (about 3 in 4  adolescents or 2 in 3 adults)  · Redness or swelling where the shot was given (about 1 person in 5)  · Mild fever of at least 100.4°F (up to about 1 in 25 adolescents or 1 in 100 adults)  · Headache (about 3 or 4 people in 10)  · Tiredness (about 1 person in 3 or 4)  · Nausea, vomiting, diarrhea, stomach ache (up to 1 in 4 adolescents or 1 in 10 adults)  · Chills, sore joints (about 1 person in 10)  · Body aches (about 1 person in 3 or 4)  · Rash, swollen glands (uncommon)  Moderate problems following Tdap  (Interfered with activities, but did not require medical attention)  · Pain where the shot was given (up to 1 in 5 or 6)  · Redness or swelling where the shot was given (up to about 1 in 16 adolescents or 1 in 12 adults)  · Fever over 102°F (about 1 in 100 adolescents or 1 in 250 adults)  · Headache (about 1 in 7 adolescents or 1 in 10 adults)  · Nausea, vomiting, diarrhea, stomach ache (up to 1 or 3 people in 100)  · Swelling of the entire arm where the shot was given (up to about 1 in 500).  Severe problems following Tdap  (Unable to perform usual activities; required medical attention)  · Swelling, severe pain, bleeding and redness in the arm where the shot was given (rare).  Problems that could happen after any vaccine:  · People sometimes faint after a medical procedure, including vaccination. Sitting or lying down for about 15 minutes can help prevent fainting, and injuries caused by a fall. Tell your doctor if you feel dizzy, or have vision changes or ringing in the ears.  · Some people get severe pain in the shoulder and have difficulty moving the arm where a shot was given. This happens very rarely.  · Any medication can cause a severe allergic reaction. Such reactions from a vaccine are very rare, estimated at fewer than 1 in a million doses, and would happen within a few minutes to a few hours after the vaccination.  As with any medicine, there is a very remote chance of a vaccine causing a serious  injury or death.  The safety of vaccines is always being monitored. For more information, visit: www.cdc.gov/vaccinesafety/  5. What if there is a serious problem?  What should I look for?  · Look for anything that concerns you, such as signs of a severe allergic reaction, very high fever, or unusual behavior.  · Signs of a severe allergic reaction can include hives, swelling of the face and throat, difficulty breathing, a fast heartbeat, dizziness, and weakness. These would usually start a few minutes to a few hours after the vaccination.  What should I do?  · If you think it is a severe allergic reaction or other emergency that can't wait, call 9-1-1 or get the person to the nearest hospital. Otherwise, call your doctor.  · Afterward, the reaction should be reported to the Vaccine Adverse Event Reporting System (VAERS). Your doctor might file this report, or you can do it yourself through the VAERS web site at www.vaers.hhs.gov, or by calling 1-452.445.3074.  VAERS does not give medical advice.   6. The National Vaccine Injury Compensation Program  The National Vaccine Injury Compensation Program (VICP) is a federal program that was created to compensate people who may have been injured by certain vaccines.  Persons who believe they may have been injured by a vaccine can learn about the program and about filing a claim by calling 1-646.192.3968 or visiting the VICP website at www.hrsa.gov/vaccinecompensation. There is a time limit to file a claim for compensation.  7. How can I learn more?  · Ask your doctor. He or she can give you the vaccine package insert or suggest other sources of information.  · Call your local or state health department.  · Contact the Centers for Disease Control and Prevention (CDC):    Call 1-597.871.3525 (1-958-QEA-INFO) or    Visit CDC's website at www.cdc.gov/vaccines  CDC Tdap Vaccine VIS (2/24/15)     This information is not intended to replace advice given to you by your health care  provider. Make sure you discuss any questions you have with your health care provider.     Document Released: 06/18/2013 Document Revised: 01/08/2016 Document Reviewed: 04/01/2015  Elsevier Interactive Patient Education ©2017 [employer]

## 2017-12-29 LAB
BACTERIA SPEC AEROBE CULT: NORMAL
RPR SER QL: NORMAL

## 2018-02-09 DIAGNOSIS — F41.9 ANXIETY: ICD-10-CM

## 2018-02-10 RX ORDER — BUSPIRONE HYDROCHLORIDE 5 MG/1
TABLET ORAL
Qty: 90 TABLET | Refills: 0 | Status: SHIPPED | OUTPATIENT
Start: 2018-02-10 | End: 2018-03-08 | Stop reason: SDUPTHER

## 2018-03-08 DIAGNOSIS — F41.9 ANXIETY: ICD-10-CM

## 2018-03-08 RX ORDER — BUSPIRONE HYDROCHLORIDE 5 MG/1
TABLET ORAL
Qty: 90 TABLET | Refills: 0 | Status: SHIPPED | OUTPATIENT
Start: 2018-03-08 | End: 2018-10-31 | Stop reason: SDUPTHER

## 2018-04-25 DIAGNOSIS — I10 ESSENTIAL HYPERTENSION: ICD-10-CM

## 2018-04-25 DIAGNOSIS — F41.9 ANXIETY: ICD-10-CM

## 2018-04-25 RX ORDER — SERTRALINE HYDROCHLORIDE 100 MG/1
TABLET, FILM COATED ORAL
Qty: 30 TABLET | Refills: 2 | OUTPATIENT
Start: 2018-04-25

## 2018-04-25 RX ORDER — NIFEDIPINE 60 MG/1
TABLET, FILM COATED, EXTENDED RELEASE ORAL
Qty: 30 TABLET | Refills: 2 | Status: SHIPPED | OUTPATIENT
Start: 2018-04-25 | End: 2019-02-20

## 2018-08-20 DIAGNOSIS — I10 ESSENTIAL HYPERTENSION: ICD-10-CM

## 2018-08-21 RX ORDER — NIFEDIPINE 60 MG/1
60 TABLET, FILM COATED, EXTENDED RELEASE ORAL DAILY
Qty: 90 TABLET | Refills: 3 | Status: SHIPPED | OUTPATIENT
Start: 2018-08-21 | End: 2019-01-14

## 2018-08-21 RX ORDER — LOSARTAN POTASSIUM AND HYDROCHLOROTHIAZIDE 25; 100 MG/1; MG/1
1 TABLET ORAL DAILY
Qty: 90 TABLET | Refills: 3 | Status: SHIPPED | OUTPATIENT
Start: 2018-08-21 | End: 2019-02-20

## 2018-10-08 ENCOUNTER — OFFICE VISIT (OUTPATIENT)
Dept: FAMILY MEDICINE CLINIC | Facility: CLINIC | Age: 38
End: 2018-10-08

## 2018-10-08 VITALS
BODY MASS INDEX: 44.41 KG/M2 | TEMPERATURE: 98.6 F | HEIGHT: 68 IN | DIASTOLIC BLOOD PRESSURE: 74 MMHG | WEIGHT: 293 LBS | HEART RATE: 88 BPM | SYSTOLIC BLOOD PRESSURE: 128 MMHG | OXYGEN SATURATION: 97 %

## 2018-10-08 DIAGNOSIS — K64.9 HEMORRHOIDS, UNSPECIFIED HEMORRHOID TYPE: Primary | ICD-10-CM

## 2018-10-08 DIAGNOSIS — K61.0 PERIANAL ABSCESS: ICD-10-CM

## 2018-10-08 PROCEDURE — 99213 OFFICE O/P EST LOW 20 MIN: CPT | Performed by: FAMILY MEDICINE

## 2018-10-08 RX ORDER — CIPROFLOXACIN 500 MG/1
500 TABLET, FILM COATED ORAL EVERY 12 HOURS SCHEDULED
Qty: 14 TABLET | Refills: 0 | Status: SHIPPED | OUTPATIENT
Start: 2018-10-08 | End: 2018-10-31

## 2018-10-08 RX ORDER — METRONIDAZOLE 500 MG/1
500 TABLET ORAL 2 TIMES DAILY
Qty: 14 TABLET | Refills: 0 | Status: SHIPPED | OUTPATIENT
Start: 2018-10-08 | End: 2018-10-31

## 2018-10-08 NOTE — PROGRESS NOTES
"Subjective   Starr Ruiz is a 38 y.o. female.     Pt is here due to hemmroids. Started about a week ago. Unable to work today due to not being able to sit long and she drivers a school bus. Was seen at [facility] saturday and prescribed hydrocortisone cream. Using OTC stool softener.    Hemorrhoids   This is a new problem. The current episode started in the past 7 days. The problem occurs constantly. The problem has been gradually improving. Pertinent negatives include no abdominal pain, anorexia, change in bowel habit, chest pain, congestion, coughing, fatigue, headaches or numbness. Exacerbated by: sitting on job. Treatments tried: steroid ointment. The treatment provided mild relief.        The following portions of the patient's history were reviewed and updated as appropriate: allergies, current medications, past family history, past medical history, past social history, past surgical history and problem list.    Review of Systems   Constitutional: Negative for fatigue.   HENT: Negative for congestion.    Respiratory: Negative for cough.    Cardiovascular: Negative for chest pain.   Gastrointestinal: Positive for hemorrhoids. Negative for abdominal pain, anorexia and change in bowel habit.   Neurological: Negative for numbness and headaches.       Objective     Vitals:    10/08/18 1523   BP: 128/74   Pulse: 88   Temp: 98.6 °F (37 °C)   SpO2: 97%   Weight: (!) 146 kg (322 lb 12.8 oz)   Height: 172.7 cm (68\")       Physical Exam   Constitutional: She appears well-developed and well-nourished.   Genitourinary: Rectal exam shows no external hemorrhoid, no internal hemorrhoid, no fissure and no tenderness.   Genitourinary Comments: She has a small hemorrhoid at 4 oclock.  No cellulitis.  There is pus around the perianal skin.  No external abscess visible.     Nursing note and vitals reviewed.      Assessment/Plan     Problem List Items Addressed This Visit        Cardiovascular and Mediastinum    Hemorrhoids - " Primary    Relevant Medications    ciprofloxacin (CIPRO) 500 MG tablet    metroNIDAZOLE (FLAGYL) 500 MG tablet    Other Relevant Orders    Ambulatory Referral to Colorectal Surgery       Other    Perianal abscess        She appears to have a fistula or small abscess that is draining.  She has been doing sitz baths and felt it drain on Saturday.    Refer colorectal

## 2018-10-31 ENCOUNTER — OFFICE VISIT (OUTPATIENT)
Dept: FAMILY MEDICINE CLINIC | Facility: CLINIC | Age: 38
End: 2018-10-31

## 2018-10-31 VITALS
BODY MASS INDEX: 44.41 KG/M2 | HEIGHT: 68 IN | HEART RATE: 100 BPM | SYSTOLIC BLOOD PRESSURE: 128 MMHG | TEMPERATURE: 99.7 F | WEIGHT: 293 LBS | DIASTOLIC BLOOD PRESSURE: 88 MMHG | OXYGEN SATURATION: 99 %

## 2018-10-31 DIAGNOSIS — F43.21 GRIEF REACTION: Primary | ICD-10-CM

## 2018-10-31 DIAGNOSIS — F41.9 ANXIETY: ICD-10-CM

## 2018-10-31 PROBLEM — F43.20 GRIEF REACTION: Status: ACTIVE | Noted: 2018-10-31

## 2018-10-31 PROCEDURE — 99214 OFFICE O/P EST MOD 30 MIN: CPT | Performed by: FAMILY MEDICINE

## 2018-10-31 RX ORDER — ESCITALOPRAM OXALATE 10 MG/1
10 TABLET ORAL DAILY
Qty: 30 TABLET | Refills: 3 | Status: SHIPPED | OUTPATIENT
Start: 2018-10-31 | End: 2019-02-20

## 2018-10-31 RX ORDER — BUSPIRONE HYDROCHLORIDE 10 MG/1
5 TABLET ORAL 3 TIMES DAILY
Qty: 60 TABLET | Refills: 3 | Status: SHIPPED | OUTPATIENT
Start: 2018-10-31 | End: 2019-02-20
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
## 2018-10-31 NOTE — PROGRESS NOTES
Subjective   Starr Ruiz is a 38 y.o. female.     History of Present Illness she has taken zoloft 100 mg in past. She stopped 2 mo ago because she ran out.        The following portions of the patient's history were reviewed and updated as appropriate: allergies, current medications, past family history, past medical history, past social history, past surgical history and problem list.    Review of Systems    Objective   Physical Exam    Assessment/Plan   There are no diagnoses linked to this encounter.

## 2018-10-31 NOTE — PROGRESS NOTES
Duy Ruiz is a 38 y.o. female and is here for a comprehensive physical exam. The patient reports {problems:94888}. Patient reports last physical date of ***    Pt last pap   Tdap   Pneumonia vaccine 2017  Last physical prior to 2017    Do you take any herbs or supplements that were not prescribed by a doctor? {yes/no/not asked:9010}  Are you taking calcium supplements? {yes/no:237246}  Are you taking aspirin daily? {yes/no:081198}  Family history of ovarian cancer? {yes/no:976101}  Family history of breast cancer? {yes/no:286550}  FH of endometrial cancer? {yes/no:524035}  FH of cervical cancer? {yes/no:243875}  FH of colon cancer? {yes/no:033036}    Cancer Screening  Mammogram up-to-date?  {yes/no:011714}  If yes, last exam date: ***  BMD up-to-date? {yes/no:076064}  If yes, last exam date: ***  Colonoscopy up-to-date? {yes/no:543951}   If yes, last exam date: ***  Pap up-to-date? {yes/no:131707}   If yes, last exam date: ***     History:  LMP: No LMP recorded.  Menopause at *** years  Last pap date: ***  Abnormal pap? {yes/no:9010}  : ***  Para: ***    Immunization History  Tdap? {yes/no:100803}  HPV? {Response; yes/no/na:63}  Pneumonia? {Response; yes/no/na:63}  Shingles? {Response; yes/no/na:63}    {Common ambulatory SmartLinks:80582}    Past Medical History:   Diagnosis Date   • Anxiety    • Hypertension        Family History   Problem Relation Age of Onset   • No Known Problems Mother    • No Known Problems Father        Past Surgical History:   Procedure Laterality Date   • D&C HYSTEROSCOPY WITH NOVASURE ENDOMETRIAL ABLATION AND MYOSURE     • LEEP     • TOOTH EXTRACTION     • TUBAL ABDOMINAL LIGATION         Social History     Social History   • Marital status: Single     Spouse name: N/A   • Number of children: N/A   • Years of education: N/A     Occupational History   • Not on file.     Social History Main Topics   • Smoking status: Current Every Day Smoker      Packs/day: 1.00   • Smokeless tobacco: Never Used   • Alcohol use Yes      Comment: rare   • Drug use: No   • Sexual activity: Yes     Partners: Male     Birth control/ protection: None     Other Topics Concern   • Not on file     Social History Narrative    Pt is single. Pt has 4 children. Pt is not currently employed.       Review of Systems  Do you have pain that bothers you in your daily life? {yes/no/not asked:9010}  Review of Systems    Objective   Physical Exam     Assessment/Plan   Healthy female exam. ***     1.   Problem List Items Addressed This Visit     None            2. Patient Counseling:  --Nutrition: Stressed importance of moderation in sodium/caffeine intake, saturated fat and cholesterol, caloric balance, sufficient intake of fresh fruits, vegetables, fiber, calcium, iron, and 1 mg of folate supplement per day (for females capable of pregnancy).  --Discussed the issue of estrogen replacement, calcium supplement, and the daily use of baby aspirin.  --Exercise: Stressed the importance of regular exercise.   --Substance Abuse: Discussed cessation/primary prevention of tobacco, alcohol, or other drug use; driving or other dangerous activities under the influence; availability of treatment for abuse.    --Sexuality: Discussed sexually transmitted diseases, partner selection, use of condoms, avoidance of unintended pregnancy  and contraceptive alternatives.   --Injury prevention: Discussed safety belts, safety helmets, smoke detector, smoking near bedding or upholstery.   --Dental health: Discussed importance of regular tooth brushing, flossing, and dental visits.  --Immunizations reviewed.  --Discussed benefits of screening colonoscopy.  --After hours service discussed with patient    3. Discussed the patient's BMI with her.  The BMI {[facility] ([facility]85716}  4. Follow up {follow-up interval:44494}

## 2018-10-31 NOTE — PROGRESS NOTES
Duy Ruiz is a 38 y.o. female and is here for a comprehensive physical exam. The patient reports {problems:99620}. Patient reports last physical date of ***    Pt last physical prior to 2017.  Pt last pap .  Pt last tdap .  Pt is not fasting today.  Pt declines flu vaccine today.    Do you take any herbs or supplements that were not prescribed by a doctor? {yes/no/not asked:9010}  Are you taking calcium supplements? {yes/no:366655}  Are you taking aspirin daily? {yes/no:730125}  Family history of ovarian cancer? {yes/no:687813}  Family history of breast cancer? {yes/no:573920}  FH of endometrial cancer? {yes/no:942214}  FH of cervical cancer? {yes/no:568125}  FH of colon cancer? {yes/no:914756}    Cancer Screening  Mammogram up-to-date?  {yes/no:721100}  If yes, last exam date: ***  BMD up-to-date? {yes/no:806827}  If yes, last exam date: ***  Colonoscopy up-to-date? {yes/no:004363}   If yes, last exam date: ***  Pap up-to-date? {yes/no:076631}   If yes, last exam date: ***     History:  LMP: No LMP recorded.  Menopause at *** years  Last pap date: ***  Abnormal pap? {yes/no:9010}  : ***  Para: ***    Immunization History  Tdap? {yes/no:334887}  HPV? {Response; yes/no/na:63}  Pneumonia? {Response; yes/no/na:63}  Shingles? {Response; yes/no/na:63}    {Common ambulatory SmartLinks:94393}    Past Medical History:   Diagnosis Date   • Anxiety    • Hypertension        Family History   Problem Relation Age of Onset   • No Known Problems Mother    • No Known Problems Father        Past Surgical History:   Procedure Laterality Date   • D&C HYSTEROSCOPY WITH NOVASURE ENDOMETRIAL ABLATION AND MYOSURE     • LEEP     • TOOTH EXTRACTION     • TUBAL ABDOMINAL LIGATION         Social History     Social History   • Marital status: Single     Spouse name: N/A   • Number of children: N/A   • Years of education: N/A     Occupational History   • Not on file.     Social History Main Topics   •  Smoking status: Current Every Day Smoker     Packs/day: 1.00   • Smokeless tobacco: Never Used   • Alcohol use Yes      Comment: rare   • Drug use: No   • Sexual activity: Yes     Partners: Male     Birth control/ protection: None     Other Topics Concern   • Not on file     Social History Narrative    Pt is single. Pt has 4 children. Pt is not currently employed.       Review of Systems  Do you have pain that bothers you in your daily life? {yes/no/not asked:9010}  Review of Systems    Objective   Physical Exam     Assessment/Plan   Healthy female exam. ***     1.   Problem List Items Addressed This Visit     None            2. Patient Counseling:  --Nutrition: Stressed importance of moderation in sodium/caffeine intake, saturated fat and cholesterol, caloric balance, sufficient intake of fresh fruits, vegetables, fiber, calcium, iron, and 1 mg of folate supplement per day (for females capable of pregnancy).  --Discussed the issue of estrogen replacement, calcium supplement, and the daily use of baby aspirin.  --Exercise: Stressed the importance of regular exercise.   --Substance Abuse: Discussed cessation/primary prevention of tobacco, alcohol, or other drug use; driving or other dangerous activities under the influence; availability of treatment for abuse.    --Sexuality: Discussed sexually transmitted diseases, partner selection, use of condoms, avoidance of unintended pregnancy  and contraceptive alternatives.   --Injury prevention: Discussed safety belts, safety helmets, smoke detector, smoking near bedding or upholstery.   --Dental health: Discussed importance of regular tooth brushing, flossing, and dental visits.  --Immunizations reviewed.  --Discussed benefits of screening colonoscopy.  --After hours service discussed with patient    3. Discussed the patient's BMI with her.  The BMI {BMI plan ([facility] measure 421):68762}  4. Follow up {follow-up interval:20078}

## 2018-11-13 ENCOUNTER — OFFICE VISIT (OUTPATIENT)
Dept: FAMILY MEDICINE CLINIC | Facility: CLINIC | Age: 38
End: 2018-11-13

## 2018-11-13 VITALS — OXYGEN SATURATION: 98 % | BODY MASS INDEX: 44.41 KG/M2 | HEIGHT: 68 IN | WEIGHT: 293 LBS | HEART RATE: 77 BPM

## 2018-11-13 DIAGNOSIS — K60.3 PERIANAL FISTULA: ICD-10-CM

## 2018-11-13 DIAGNOSIS — K61.0 PERIANAL ABSCESS: Primary | ICD-10-CM

## 2018-11-13 PROBLEM — K60.30 PERIANAL FISTULA: Status: ACTIVE | Noted: 2018-11-13

## 2018-11-13 PROCEDURE — 99213 OFFICE O/P EST LOW 20 MIN: CPT | Performed by: FAMILY MEDICINE

## 2018-11-13 NOTE — PROGRESS NOTES
"Subjective   Starr Ruiz is a 38 y.o. female.     Pt is here for 2 weeks fu. Pt states she is \"good\" she is back with boyfriend. She is pushing through.    Pt has what she has a boil or something on inside of right butt cheek. Noticed Wednesday last week. Is draining.    History of Present Illness ports she noticed a red bump at the posterior aspect of her anus last week.  It is not as sore or tender as the previous lesion C she had.  She was evaluated by colorectal and was found to have an abscess.  She was continued on Cipro and Flagyl for the abscess.  Once she finished the antibiotics this lesion returned and she has had purulent drainage from the anal lesion since.  No fevers    The following portions of the patient's history were reviewed and updated as appropriate: allergies, current medications, past family history, past medical history, past social history, past surgical history and problem list.    Review of Systems   Constitutional: Negative.    HENT: Negative.    Eyes: Negative.    Respiratory: Negative.    Cardiovascular: Negative.    Gastrointestinal: Negative.    Endocrine: Negative.    Genitourinary: Positive for genital sores.   Musculoskeletal: Negative.    Skin: Positive for rash and wound.   Allergic/Immunologic: Negative.    Neurological: Negative.    Hematological: Negative.    Psychiatric/Behavioral: Negative.    All other systems reviewed and are negative.      Objective     Vitals:    11/13/18 1020   Pulse: 77   SpO2: 98%   Weight: (!) 144 kg (317 lb)   Height: 172.7 cm (68\")       Physical Exam   Genitourinary:         Nursing note and vitals reviewed.      Assessment/Plan     Problem List Items Addressed This Visit        Other    Perianal abscess - Primary    Perianal fistula        Have called the colorectal surgeons she saw a couple weeks ago.  He is amendable to seeing her today.  She will go over to his office right now.  "

## 2019-01-14 ENCOUNTER — OFFICE VISIT (OUTPATIENT)
Dept: FAMILY MEDICINE CLINIC | Facility: CLINIC | Age: 39
End: 2019-01-14

## 2019-01-14 VITALS
SYSTOLIC BLOOD PRESSURE: 130 MMHG | HEART RATE: 98 BPM | RESPIRATION RATE: 16 BRPM | TEMPERATURE: 98.3 F | BODY MASS INDEX: 44.41 KG/M2 | HEIGHT: 68 IN | WEIGHT: 293 LBS | OXYGEN SATURATION: 97 % | DIASTOLIC BLOOD PRESSURE: 84 MMHG

## 2019-01-14 DIAGNOSIS — J10.1 INFLUENZA A: Primary | ICD-10-CM

## 2019-01-14 PROBLEM — E66.8 EXTREME OBESITY: Status: ACTIVE | Noted: 2019-01-14

## 2019-01-14 PROBLEM — K64.9 HEMORRHOIDS: Status: RESOLVED | Noted: 2018-10-08 | Resolved: 2019-01-14

## 2019-01-14 PROBLEM — Z20.2 EXPOSURE TO STD: Status: RESOLVED | Noted: 2017-12-27 | Resolved: 2019-01-14

## 2019-01-14 PROBLEM — F17.200 COMPULSIVE TOBACCO USER SYNDROME: Status: ACTIVE | Noted: 2019-01-14

## 2019-01-14 PROBLEM — E66.9 EXTREME OBESITY: Status: ACTIVE | Noted: 2019-01-14

## 2019-01-14 PROBLEM — I10 BP (HIGH BLOOD PRESSURE): Status: ACTIVE | Noted: 2019-01-14

## 2019-01-14 PROBLEM — K61.0 PERIANAL ABSCESS: Status: RESOLVED | Noted: 2018-10-08 | Resolved: 2019-01-14

## 2019-01-14 PROBLEM — I10 HYPERTENSION: Status: RESOLVED | Noted: 2017-02-27 | Resolved: 2019-01-14

## 2019-01-14 PROBLEM — N94.6 DYSMENORRHEA: Status: RESOLVED | Noted: 2017-03-27 | Resolved: 2019-01-14

## 2019-01-14 LAB
EXPIRATION DATE: ABNORMAL
FLUAV AG NPH QL: POSITIVE
FLUBV AG NPH QL: NEGATIVE
INTERNAL CONTROL: ABNORMAL
Lab: ABNORMAL

## 2019-01-14 PROCEDURE — 87804 INFLUENZA ASSAY W/OPTIC: CPT | Performed by: FAMILY MEDICINE

## 2019-01-14 PROCEDURE — 99213 OFFICE O/P EST LOW 20 MIN: CPT | Performed by: FAMILY MEDICINE

## 2019-01-14 RX ORDER — BENZONATATE 100 MG/1
100 CAPSULE ORAL 3 TIMES DAILY PRN
Qty: 20 CAPSULE | Refills: 0 | Status: SHIPPED | OUTPATIENT
Start: 2019-01-14 | End: 2019-02-20

## 2019-01-14 RX ORDER — OSELTAMIVIR PHOSPHATE 75 MG/1
75 CAPSULE ORAL 2 TIMES DAILY
Qty: 10 CAPSULE | Refills: 0 | Status: SHIPPED | OUTPATIENT
Start: 2019-01-14 | End: 2019-02-20

## 2019-01-14 NOTE — PROGRESS NOTES
"Subjective   Starr Ruiz is a 38 y.o. female seen today for Cough and Generalized Body Aches.     Cough   This is a new problem. The current episode started in the past 7 days. The cough is productive of sputum (yellow). Associated symptoms include chills, a fever and myalgias. Pertinent negatives include no chest pain or shortness of breath. The symptoms are aggravated by lying down. She has tried OTC cough suppressant and rest (Dayquil and Mucinex) for the symptoms.        The following portions of the patient's history were reviewed and updated as appropriate: allergies, current medications, past social history and problem list.    Review of Systems   Constitutional: Positive for chills and fever.   Respiratory: Positive for cough. Negative for shortness of breath.    Cardiovascular: Negative for chest pain.   Musculoskeletal: Positive for myalgias.       Objective   /84   Pulse 98   Temp 98.3 °F (36.8 °C)   Resp 16   Ht 172.7 cm (68\")   Wt (!) 145 kg (319 lb)   SpO2 97%   BMI 48.50 kg/m²   Physical Exam   Constitutional: She appears well-developed and well-nourished.   HENT:   Mouth/Throat: Oropharynx is clear and moist.   Cardiovascular: Normal rate and regular rhythm.   Pulmonary/Chest: Effort normal and breath sounds normal.   Nursing note and vitals reviewed.      Assessment/Plan   Problem List Items Addressed This Visit     None      Visit Diagnoses     Influenza A    -  Primary    Relevant Orders    POC Influenza A / B (Completed)              Rest and Drink plenty fluids.  Stay off work today and tomorrow.    Rx for Tamiflu 75 mg twice a day for 5 days #10+0.    Rx for Benzonate 100 mg three times a day #20+0.    Follow up as needed.              Scribed for Dr Raad Davidson by Kristina Duffy CMA.          I, Raad Davidson MD, personally performed the services described in this documentation, as scribed by Kristina Duffy in my presence, and is both accurate and complete.        (Please " note that portions of this note were completed with a voice recognition program. Efforts were made to edit the dictations,but occasionally words are mis transcribed.)

## 2019-02-20 ENCOUNTER — OFFICE VISIT (OUTPATIENT)
Dept: FAMILY MEDICINE CLINIC | Facility: CLINIC | Age: 39
End: 2019-02-20

## 2019-02-20 VITALS
HEIGHT: 68 IN | BODY MASS INDEX: 44.41 KG/M2 | WEIGHT: 293 LBS | TEMPERATURE: 97.5 F | HEART RATE: 89 BPM | OXYGEN SATURATION: 98 % | DIASTOLIC BLOOD PRESSURE: 78 MMHG | SYSTOLIC BLOOD PRESSURE: 140 MMHG

## 2019-02-20 DIAGNOSIS — F41.9 ANXIETY: ICD-10-CM

## 2019-02-20 DIAGNOSIS — I10 ESSENTIAL HYPERTENSION: ICD-10-CM

## 2019-02-20 DIAGNOSIS — R42 DIZZINESS: ICD-10-CM

## 2019-02-20 DIAGNOSIS — R23.2 FLUSHING: ICD-10-CM

## 2019-02-20 DIAGNOSIS — R35.0 URINARY FREQUENCY: Primary | ICD-10-CM

## 2019-02-20 DIAGNOSIS — N64.4 BREAST TENDERNESS IN FEMALE: ICD-10-CM

## 2019-02-20 DIAGNOSIS — F43.21 GRIEF REACTION: ICD-10-CM

## 2019-02-20 PROBLEM — Z32.01 POSITIVE PREGNANCY TEST: Status: ACTIVE | Noted: 2019-02-20

## 2019-02-20 LAB
25(OH)D3 SERPL-MCNC: 13.5 NG/ML
ALBUMIN SERPL-MCNC: 4.3 G/DL (ref 3.2–4.8)
ALBUMIN/GLOB SERPL: 2 G/DL (ref 1.5–2.5)
ALP SERPL-CCNC: 66 U/L (ref 25–100)
ALT SERPL W P-5'-P-CCNC: 11 U/L (ref 7–40)
ANION GAP SERPL CALCULATED.3IONS-SCNC: 3 MMOL/L (ref 3–11)
AST SERPL-CCNC: 16 U/L (ref 0–33)
BASOPHILS # BLD AUTO: 0.04 10*3/MM3 (ref 0–0.2)
BASOPHILS NFR BLD AUTO: 0.5 % (ref 0–1)
BILIRUB BLD-MCNC: NEGATIVE MG/DL
BILIRUB SERPL-MCNC: 0.3 MG/DL (ref 0.3–1.2)
BUN BLD-MCNC: 12 MG/DL (ref 9–23)
BUN/CREAT SERPL: 15 (ref 7–25)
CALCIUM SPEC-SCNC: 9.1 MG/DL (ref 8.7–10.4)
CHLORIDE SERPL-SCNC: 107 MMOL/L (ref 99–109)
CLARITY, POC: CLEAR
CO2 SERPL-SCNC: 30 MMOL/L (ref 20–31)
COLOR UR: YELLOW
CREAT BLD-MCNC: 0.8 MG/DL (ref 0.6–1.3)
DEPRECATED RDW RBC AUTO: 49.5 FL (ref 37–54)
EOSINOPHIL # BLD AUTO: 0.41 10*3/MM3 (ref 0–0.3)
EOSINOPHIL NFR BLD AUTO: 5.2 % (ref 0–3)
ERYTHROCYTE [DISTWIDTH] IN BLOOD BY AUTOMATED COUNT: 13.6 % (ref 11.3–14.5)
EXPIRATION DATE: NORMAL
GFR SERPL CREATININE-BSD FRML MDRD: 80 ML/MIN/1.73
GLOBULIN UR ELPH-MCNC: 2.2 GM/DL
GLUCOSE BLD-MCNC: 87 MG/DL (ref 70–100)
GLUCOSE UR STRIP-MCNC: NEGATIVE MG/DL
HBA1C MFR BLD: 4.4 % (ref 4.8–5.6)
HCT VFR BLD AUTO: 43.3 % (ref 34.5–44)
HGB BLD-MCNC: 13.9 G/DL (ref 11.5–15.5)
IMM GRANULOCYTES # BLD AUTO: 0.04 10*3/MM3 (ref 0–0.05)
IMM GRANULOCYTES NFR BLD AUTO: 0.5 % (ref 0–0.6)
KETONES UR QL: NEGATIVE
LEUKOCYTE EST, POC: NEGATIVE
LYMPHOCYTES # BLD AUTO: 1.98 10*3/MM3 (ref 0.6–4.8)
LYMPHOCYTES NFR BLD AUTO: 25.3 % (ref 24–44)
Lab: NORMAL
MCH RBC QN AUTO: 32.1 PG (ref 27–31)
MCHC RBC AUTO-ENTMCNC: 32.1 G/DL (ref 32–36)
MCV RBC AUTO: 100 FL (ref 80–99)
MONOCYTES # BLD AUTO: 0.44 10*3/MM3 (ref 0–1)
MONOCYTES NFR BLD AUTO: 5.6 % (ref 0–12)
NEUTROPHILS # BLD AUTO: 4.96 10*3/MM3 (ref 1.5–8.3)
NEUTROPHILS NFR BLD AUTO: 63.4 % (ref 41–71)
NITRITE UR-MCNC: NEGATIVE MG/ML
PH UR: 6 [PH] (ref 5–8)
PLATELET # BLD AUTO: 242 10*3/MM3 (ref 150–450)
PMV BLD AUTO: 9.2 FL (ref 6–12)
POTASSIUM BLD-SCNC: 3.6 MMOL/L (ref 3.5–5.5)
PROT SERPL-MCNC: 6.5 G/DL (ref 5.7–8.2)
PROT UR STRIP-MCNC: NEGATIVE MG/DL
RBC # BLD AUTO: 4.33 10*6/MM3 (ref 3.89–5.14)
RBC # UR STRIP: NEGATIVE /UL
SODIUM BLD-SCNC: 140 MMOL/L (ref 132–146)
SP GR UR: 1.01 (ref 1–1.03)
TSH SERPL DL<=0.05 MIU/L-ACNC: 2.02 MIU/ML (ref 0.35–5.35)
UROBILINOGEN UR QL: NORMAL
VIT B12 BLD-MCNC: 437 PG/ML (ref 211–911)
WBC NRBC COR # BLD: 7.83 10*3/MM3 (ref 3.5–10.8)

## 2019-02-20 PROCEDURE — 85025 COMPLETE CBC W/AUTO DIFF WBC: CPT | Performed by: FAMILY MEDICINE

## 2019-02-20 PROCEDURE — 82607 VITAMIN B-12: CPT | Performed by: FAMILY MEDICINE

## 2019-02-20 PROCEDURE — 83036 HEMOGLOBIN GLYCOSYLATED A1C: CPT | Performed by: FAMILY MEDICINE

## 2019-02-20 PROCEDURE — 82306 VITAMIN D 25 HYDROXY: CPT | Performed by: FAMILY MEDICINE

## 2019-02-20 PROCEDURE — 99214 OFFICE O/P EST MOD 30 MIN: CPT | Performed by: FAMILY MEDICINE

## 2019-02-20 PROCEDURE — 84443 ASSAY THYROID STIM HORMONE: CPT | Performed by: FAMILY MEDICINE

## 2019-02-20 PROCEDURE — 80053 COMPREHEN METABOLIC PANEL: CPT | Performed by: FAMILY MEDICINE

## 2019-02-20 RX ORDER — ESCITALOPRAM OXALATE 20 MG/1
20 TABLET ORAL DAILY
Qty: 90 TABLET | Refills: 1 | Status: SHIPPED | OUTPATIENT
Start: 2019-02-20 | End: 2019-09-10 | Stop reason: SDUPTHER

## 2019-02-20 RX ORDER — LOSARTAN POTASSIUM AND HYDROCHLOROTHIAZIDE 25; 100 MG/1; MG/1
1 TABLET ORAL DAILY
Qty: 90 TABLET | Refills: 3 | Status: SHIPPED | OUTPATIENT
Start: 2019-02-20 | End: 2019-09-17

## 2019-02-20 RX ORDER — NIFEDIPINE 90 MG/1
90 TABLET, EXTENDED RELEASE ORAL DAILY
Qty: 90 TABLET | Refills: 3 | Status: SHIPPED | OUTPATIENT
Start: 2019-02-20 | End: 2019-09-10 | Stop reason: SDUPTHER

## 2019-03-08 ENCOUNTER — TRANSCRIBE ORDERS (OUTPATIENT)
Dept: MAMMOGRAPHY | Facility: HOSPITAL | Age: 39
End: 2019-03-08

## 2019-03-08 ENCOUNTER — HOSPITAL ENCOUNTER (OUTPATIENT)
Dept: ULTRASOUND IMAGING | Facility: HOSPITAL | Age: 39
Discharge: HOME | End: 2019-03-08

## 2019-03-08 ENCOUNTER — HOSPITAL ENCOUNTER (OUTPATIENT)
Dept: MAMMOGRAPHY | Facility: HOSPITAL | Age: 39
Discharge: HOME | End: 2019-03-08
Admitting: FAMILY MEDICINE

## 2019-03-08 DIAGNOSIS — N64.4 BREAST TENDERNESS IN FEMALE: ICD-10-CM

## 2019-03-08 DIAGNOSIS — R92.8 ABNORMAL MAMMOGRAM: Primary | ICD-10-CM

## 2019-03-08 PROCEDURE — 77066 DX MAMMO INCL CAD BI: CPT

## 2019-03-08 PROCEDURE — G0279 TOMOSYNTHESIS, MAMMO: HCPCS

## 2019-03-08 PROCEDURE — 77062 BREAST TOMOSYNTHESIS BI: CPT | Performed by: RADIOLOGY

## 2019-03-08 PROCEDURE — 76642 ULTRASOUND BREAST LIMITED: CPT

## 2019-03-08 PROCEDURE — 77066 DX MAMMO INCL CAD BI: CPT | Performed by: RADIOLOGY

## 2019-03-08 PROCEDURE — 76642 ULTRASOUND BREAST LIMITED: CPT | Performed by: RADIOLOGY

## 2019-03-11 DIAGNOSIS — F41.9 ANXIETY: ICD-10-CM

## 2019-03-11 DIAGNOSIS — E55.9 VITAMIN D DEFICIENCY: Primary | ICD-10-CM

## 2019-03-11 DIAGNOSIS — F43.21 GRIEF REACTION: ICD-10-CM

## 2019-03-11 RX ORDER — ESCITALOPRAM OXALATE 10 MG/1
TABLET ORAL
Qty: 30 TABLET | Refills: 2 | Status: SHIPPED | OUTPATIENT
Start: 2019-03-11 | End: 2019-09-10 | Stop reason: SDUPTHER

## 2019-03-11 RX ORDER — ERGOCALCIFEROL 1.25 MG/1
50000 CAPSULE ORAL WEEKLY
Qty: 12 CAPSULE | Refills: 1 | Status: SHIPPED | OUTPATIENT
Start: 2019-03-11 | End: 2019-09-17

## 2019-04-12 DIAGNOSIS — F41.9 ANXIETY: ICD-10-CM

## 2019-04-15 RX ORDER — BUSPIRONE HYDROCHLORIDE 10 MG/1
TABLET ORAL
Qty: 45 TABLET | Refills: 2 | Status: SHIPPED | OUTPATIENT
Start: 2019-04-15 | End: 2021-05-10

## 2019-09-10 DIAGNOSIS — F41.9 ANXIETY: ICD-10-CM

## 2019-09-10 DIAGNOSIS — I10 ESSENTIAL HYPERTENSION: ICD-10-CM

## 2019-09-10 DIAGNOSIS — F43.21 GRIEF REACTION: ICD-10-CM

## 2019-09-10 RX ORDER — ESCITALOPRAM OXALATE 10 MG/1
10 TABLET ORAL DAILY
Qty: 30 TABLET | Refills: 0 | Status: SHIPPED | OUTPATIENT
Start: 2019-09-10 | End: 2019-09-17 | Stop reason: SDUPTHER

## 2019-09-10 RX ORDER — NIFEDIPINE 90 MG/1
90 TABLET, EXTENDED RELEASE ORAL DAILY
Qty: 30 TABLET | Refills: 0 | Status: SHIPPED | OUTPATIENT
Start: 2019-09-10 | End: 2020-06-22

## 2019-09-17 ENCOUNTER — OFFICE VISIT (OUTPATIENT)
Dept: FAMILY MEDICINE CLINIC | Facility: CLINIC | Age: 39
End: 2019-09-17

## 2019-09-17 VITALS
HEIGHT: 68 IN | OXYGEN SATURATION: 99 % | BODY MASS INDEX: 44.41 KG/M2 | WEIGHT: 293 LBS | HEART RATE: 81 BPM | DIASTOLIC BLOOD PRESSURE: 106 MMHG | SYSTOLIC BLOOD PRESSURE: 154 MMHG | TEMPERATURE: 97.9 F

## 2019-09-17 DIAGNOSIS — I10 ESSENTIAL HYPERTENSION: Primary | ICD-10-CM

## 2019-09-17 DIAGNOSIS — E66.01 MORBID OBESITY DUE TO EXCESS CALORIES (HCC): ICD-10-CM

## 2019-09-17 DIAGNOSIS — F41.9 ANXIETY: ICD-10-CM

## 2019-09-17 DIAGNOSIS — Z72.0 TOBACCO ABUSE: ICD-10-CM

## 2019-09-17 PROBLEM — Z32.01 POSITIVE PREGNANCY TEST: Status: RESOLVED | Noted: 2019-02-20 | Resolved: 2019-09-17

## 2019-09-17 PROCEDURE — 99213 OFFICE O/P EST LOW 20 MIN: CPT | Performed by: FAMILY MEDICINE

## 2019-09-17 RX ORDER — ESCITALOPRAM OXALATE 10 MG/1
10 TABLET ORAL DAILY
Qty: 90 TABLET | Refills: 3 | Status: SHIPPED | OUTPATIENT
Start: 2019-09-17 | End: 2020-10-26

## 2019-09-17 RX ORDER — LOSARTAN POTASSIUM 100 MG/1
100 TABLET ORAL DAILY
Qty: 90 TABLET | Refills: 3 | Status: SHIPPED | OUTPATIENT
Start: 2019-09-17 | End: 2020-10-26

## 2019-09-17 RX ORDER — HYDROCHLOROTHIAZIDE 12.5 MG/1
12.5 TABLET ORAL DAILY
Qty: 90 TABLET | Refills: 3 | Status: SHIPPED | OUTPATIENT
Start: 2019-09-17 | End: 2020-10-26

## 2019-09-17 NOTE — PROGRESS NOTES
"Duy Ruiz is a 39 y.o. female.     Pt is here for fu from visit in February.     Did not take nifedipine this morning. Normally takes around noon.    Hypertension   This is a chronic problem. The current episode started more than 1 year ago. The problem has been gradually worsening since onset. The problem is uncontrolled. Pertinent negatives include no anxiety, blurred vision, chest pain, headaches, malaise/fatigue, neck pain, peripheral edema or shortness of breath. There are no associated agents to hypertension. Risk factors for coronary artery disease include obesity. Past treatments include diuretics, angiotensin blockers and calcium channel blockers. Current antihypertension treatment includes angiotensin blockers, calcium channel blockers and diuretics. The current treatment provides mild improvement. Compliance problems include medication side effects.  There is no history of angina, kidney disease, CAD/MI or heart failure. There is no history of sleep apnea or a thyroid problem.      She is taking lexapro and tolerating it well.  It is helping with her mood. No si/hi.  She is sleeping well.      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  She has discontinue losartan/hctz.      The following portions of the patient's history were reviewed and updated as appropriate: allergies, current medications, past family history, past medical history, past social history, past surgical history and problem list.    Review of Systems   Constitutional: Negative for malaise/fatigue.   Eyes: Negative for blurred vision.   Respiratory: Negative for shortness of breath.    Cardiovascular: Negative for chest pain.   Musculoskeletal: Negative for neck pain.   Neurological: Negative for headaches.       Objective     Vitals:    09/17/19 0820   BP: (!) 154/106   Pulse: 81   Temp: 97.9 °F (36.6 °C)   SpO2: 99%   Weight: (!) 157 kg (347 lb)   Height: 172.7 cm (68\") "       Physical Exam   Constitutional: She is oriented to person, place, and time. She appears well-developed and well-nourished.   HENT:   Head: Normocephalic and atraumatic.   Eyes: EOM are normal. Pupils are equal, round, and reactive to light. Right eye exhibits no discharge. Left eye exhibits no discharge.   Neck: Normal range of motion. Neck supple.   Cardiovascular: Normal rate, regular rhythm, normal heart sounds and intact distal pulses.   Pulmonary/Chest: Effort normal and breath sounds normal.   Abdominal: Soft. Bowel sounds are normal. She exhibits no mass. There is no tenderness.   Musculoskeletal: Normal range of motion.        Right shoulder: She exhibits no swelling.   Neurological: She is alert and oriented to person, place, and time. She has normal reflexes.   Skin: Skin is warm and dry. No cyanosis. Nails show no clubbing.   Psychiatric: She has a normal mood and affect. Her behavior is normal. Judgment and thought content normal.   Nursing note and vitals reviewed.      Assessment/Plan     Problem List Items Addressed This Visit        Cardiovascular and Mediastinum    Essential hypertension - Primary    Relevant Medications    losartan (COZAAR) 100 MG tablet    hydrochlorothiazide (HYDRODIURIL) 12.5 MG tablet       Digestive    Morbid obesity due to excess calories (CMS/HCC)       Other    Tobacco abuse    Anxiety    Relevant Medications    escitalopram (LEXAPRO) 10 MG tablet        Counseled on diet and exercise.    Restart HCTZ at 12.5 mg  Restart losartan 100 mg.    Refill lexapro.   Fu 1 month.

## 2019-10-24 ENCOUNTER — HOSPITAL ENCOUNTER (OUTPATIENT)
Dept: GENERAL RADIOLOGY | Facility: HOSPITAL | Age: 39
Discharge: HOME | End: 2019-10-24
Admitting: INTERNAL MEDICINE

## 2019-10-24 ENCOUNTER — TRANSCRIBE ORDERS (OUTPATIENT)
Dept: GENERAL RADIOLOGY | Facility: HOSPITAL | Age: 39
End: 2019-10-24

## 2019-10-24 DIAGNOSIS — M25.561 RIGHT KNEE PAIN, UNSPECIFIED CHRONICITY: ICD-10-CM

## 2019-10-24 DIAGNOSIS — M25.561 RIGHT KNEE PAIN, UNSPECIFIED CHRONICITY: Primary | ICD-10-CM

## 2019-10-24 PROCEDURE — 73560 X-RAY EXAM OF KNEE 1 OR 2: CPT

## 2019-11-20 ENCOUNTER — HOSPITAL ENCOUNTER (EMERGENCY)
Facility: HOSPITAL | Age: 39
Discharge: HOME | End: 2019-11-20
Attending: EMERGENCY MEDICINE | Admitting: EMERGENCY MEDICINE

## 2019-11-20 ENCOUNTER — APPOINTMENT (OUTPATIENT)
Dept: GENERAL RADIOLOGY | Facility: HOSPITAL | Age: 39
End: 2019-11-20

## 2019-11-20 VITALS
RESPIRATION RATE: 18 BRPM | OXYGEN SATURATION: 95 % | DIASTOLIC BLOOD PRESSURE: 88 MMHG | SYSTOLIC BLOOD PRESSURE: 170 MMHG | WEIGHT: 293 LBS | HEART RATE: 87 BPM | HEIGHT: 68 IN | TEMPERATURE: 98.1 F | BODY MASS INDEX: 44.41 KG/M2

## 2019-11-20 DIAGNOSIS — M79.674 PAIN OF TOE OF RIGHT FOOT: Primary | ICD-10-CM

## 2019-11-20 LAB
ANION GAP SERPL CALCULATED.3IONS-SCNC: 13 MMOL/L (ref 5–15)
BASOPHILS # BLD AUTO: 0.06 10*3/MM3 (ref 0–0.2)
BASOPHILS NFR BLD AUTO: 0.6 % (ref 0–1.5)
BUN BLD-MCNC: 13 MG/DL (ref 6–20)
BUN/CREAT SERPL: 16.7 (ref 7–25)
CALCIUM SPEC-SCNC: 9 MG/DL (ref 8.6–10.5)
CHLORIDE SERPL-SCNC: 100 MMOL/L (ref 98–107)
CO2 SERPL-SCNC: 26 MMOL/L (ref 22–29)
CREAT BLD-MCNC: 0.78 MG/DL (ref 0.57–1)
DEPRECATED RDW RBC AUTO: 45.7 FL (ref 37–54)
EOSINOPHIL # BLD AUTO: 0.45 10*3/MM3 (ref 0–0.4)
EOSINOPHIL NFR BLD AUTO: 4.8 % (ref 0.3–6.2)
ERYTHROCYTE [DISTWIDTH] IN BLOOD BY AUTOMATED COUNT: 12.8 % (ref 12.3–15.4)
GFR SERPL CREATININE-BSD FRML MDRD: 82 ML/MIN/1.73
GLUCOSE BLD-MCNC: 130 MG/DL (ref 65–99)
HCT VFR BLD AUTO: 42.6 % (ref 34–46.6)
HGB BLD-MCNC: 13.8 G/DL (ref 12–15.9)
IMM GRANULOCYTES # BLD AUTO: 0.04 10*3/MM3 (ref 0–0.05)
IMM GRANULOCYTES NFR BLD AUTO: 0.4 % (ref 0–0.5)
LYMPHOCYTES # BLD AUTO: 1.54 10*3/MM3 (ref 0.7–3.1)
LYMPHOCYTES NFR BLD AUTO: 16.3 % (ref 19.6–45.3)
MCH RBC QN AUTO: 31.5 PG (ref 26.6–33)
MCHC RBC AUTO-ENTMCNC: 32.4 G/DL (ref 31.5–35.7)
MCV RBC AUTO: 97.3 FL (ref 79–97)
MONOCYTES # BLD AUTO: 0.71 10*3/MM3 (ref 0.1–0.9)
MONOCYTES NFR BLD AUTO: 7.5 % (ref 5–12)
NEUTROPHILS # BLD AUTO: 6.67 10*3/MM3 (ref 1.7–7)
NEUTROPHILS NFR BLD AUTO: 70.4 % (ref 42.7–76)
NRBC BLD AUTO-RTO: 0 /100 WBC (ref 0–0.2)
PLATELET # BLD AUTO: 246 10*3/MM3 (ref 140–450)
PMV BLD AUTO: 8.9 FL (ref 6–12)
POTASSIUM BLD-SCNC: 4 MMOL/L (ref 3.5–5.2)
RBC # BLD AUTO: 4.38 10*6/MM3 (ref 3.77–5.28)
SODIUM BLD-SCNC: 139 MMOL/L (ref 136–145)
URATE SERPL-MCNC: 5.5 MG/DL (ref 2.4–5.7)
WBC NRBC COR # BLD: 9.47 10*3/MM3 (ref 3.4–10.8)

## 2019-11-20 PROCEDURE — 99284 EMERGENCY DEPT VISIT MOD MDM: CPT

## 2019-11-20 PROCEDURE — 73630 X-RAY EXAM OF FOOT: CPT

## 2019-11-20 PROCEDURE — 85025 COMPLETE CBC W/AUTO DIFF WBC: CPT | Performed by: EMERGENCY MEDICINE

## 2019-11-20 PROCEDURE — 80048 BASIC METABOLIC PNL TOTAL CA: CPT | Performed by: EMERGENCY MEDICINE

## 2019-11-20 PROCEDURE — 84550 ASSAY OF BLOOD/URIC ACID: CPT | Performed by: EMERGENCY MEDICINE

## 2019-11-20 RX ORDER — IBUPROFEN 800 MG/1
800 TABLET ORAL ONCE
Status: COMPLETED | OUTPATIENT
Start: 2019-11-20 | End: 2019-11-20

## 2019-11-20 RX ORDER — HYDROCODONE BITARTRATE AND ACETAMINOPHEN 5; 325 MG/1; MG/1
1 TABLET ORAL ONCE
Status: COMPLETED | OUTPATIENT
Start: 2019-11-20 | End: 2019-11-20

## 2019-11-20 RX ORDER — HYDROCODONE BITARTRATE AND ACETAMINOPHEN 5; 325 MG/1; MG/1
1 TABLET ORAL EVERY 6 HOURS PRN
Qty: 8 TABLET | Refills: 0 | Status: SHIPPED | OUTPATIENT
Start: 2019-11-20 | End: 2020-06-22

## 2019-11-20 RX ORDER — INDOMETHACIN 50 MG/1
50 CAPSULE ORAL
Qty: 15 CAPSULE | Refills: 0 | Status: SHIPPED | OUTPATIENT
Start: 2019-11-20 | End: 2020-06-22

## 2019-11-20 RX ADMIN — HYDROCODONE BITARTATE AND ACETAMINOPHEN 1 TABLET: 5; 325 TABLET ORAL at 02:29

## 2019-11-20 RX ADMIN — IBUPROFEN 800 MG: 800 TABLET ORAL at 02:29

## 2019-11-20 NOTE — ED PROVIDER NOTES
Subjective   Starr Ruiz is a 39 yr old female that presents to the ER with c/o RT great toe pain.  Pt explains that she is a .  She got out of her truck at around 4 pm yesterday, when she stepped she experienced intense pain in her rt great toe.  Pain is throbbing.  Pain is 9/10 at this time.  Pt tried Epsom salt, 1/2 Norco with minimal relief.          History provided by:  Patient  Toe Pain   Location:  RT great toe.  Quality:  Throbbing  Severity:  Moderate  Timing:  Constant  Progression:  Unchanged  Relieved by:  Nothing  Worsened by:  Weight bearing, palpation  Associated symptoms: no fever        Review of Systems   Constitutional: Negative for fever.   Musculoskeletal:        Rt great toe pain   Neurological: Negative for weakness and numbness.   All other systems reviewed and are negative.      Past Medical History:   Diagnosis Date   • Anxiety    • Hypertension        Allergies   Allergen Reactions   • Lisinopril        Past Surgical History:   Procedure Laterality Date   • D&C HYSTEROSCOPY WITH NOVASURE ENDOMETRIAL ABLATION AND MYOSURE     • LEEP     • TOOTH EXTRACTION     • TUBAL ABDOMINAL LIGATION         Family History   Problem Relation Age of Onset   • No Known Problems Mother    • No Known Problems Father    • Breast cancer Other         age unknown    • Endometrial cancer Neg Hx    • Ovarian cancer Neg Hx        Social History     Socioeconomic History   • Marital status: Single     Spouse name: Not on file   • Number of children: Not on file   • Years of education: Not on file   • Highest education level: Not on file   Tobacco Use   • Smoking status: Current Every Day Smoker     Packs/day: 1.00   • Smokeless tobacco: Never Used   Substance and Sexual Activity   • Alcohol use: Yes     Comment: rare   • Drug use: No   • Sexual activity: Yes     Partners: Male     Birth control/protection: None   Social History Narrative    Pt is single. Pt has 4 children. Pt is not currently  employed.           Objective   Physical Exam   Constitutional: She is oriented to person, place, and time. She appears well-developed and well-nourished. No distress.   HENT:   Head: Normocephalic and atraumatic.   Eyes: Conjunctivae and EOM are normal.   Neck: Normal range of motion.   Cardiovascular: Normal heart sounds and intact distal pulses.   Pulmonary/Chest: Effort normal and breath sounds normal. No respiratory distress.   Musculoskeletal:        Right foot: There is tenderness and swelling. There is normal range of motion, normal capillary refill and no deformity.        Neurological: She is alert and oriented to person, place, and time.   Skin: Skin is warm and dry.   Nursing note and vitals reviewed.      Procedures           ED Course  ED Course as of Nov 20 0418   Wed Nov 20, 2019   0327 SANGEETA query complete. Treatment plan to include limited course of prescribed  controlled substance. Risks including addiction, benefits, and alternatives presented to patient.   Request Number: [id number]  [HV]   0411 Uric Acid: 5.5 [KG]   0417 X-ray was negative.  Her uric acid was 5.5.  Patient will be discharged home with norco and indomethicin. Pt to follow the low-pyrine diet. Pt agrees with treatment plan and verb understanding.   [KG]      ED Course User Index  [HV] Kelsey Romano  [KG] Celi Simental, WILMA          Recent Results (from the past 24 hour(s))   CBC Auto Differential    Collection Time: 11/20/19  2:00 AM   Result Value Ref Range    WBC 9.47 3.40 - 10.80 10*3/mm3    RBC 4.38 3.77 - 5.28 10*6/mm3    Hemoglobin 13.8 12.0 - 15.9 g/dL    Hematocrit 42.6 34.0 - 46.6 %    MCV 97.3 (H) 79.0 - 97.0 fL    MCH 31.5 26.6 - 33.0 pg    MCHC 32.4 31.5 - 35.7 g/dL    RDW 12.8 12.3 - 15.4 %    RDW-SD 45.7 37.0 - 54.0 fl    MPV 8.9 6.0 - 12.0 fL    Platelets 246 140 - 450 10*3/mm3    Neutrophil % 70.4 42.7 - 76.0 %    Lymphocyte % 16.3 (L) 19.6 - 45.3 %    Monocyte % 7.5 5.0 - 12.0 %    Eosinophil % 4.8 0.3 -  "6.2 %    Basophil % 0.6 0.0 - 1.5 %    Immature Grans % 0.4 0.0 - 0.5 %    Neutrophils, Absolute 6.67 1.70 - 7.00 10*3/mm3    Lymphocytes, Absolute 1.54 0.70 - 3.10 10*3/mm3    Monocytes, Absolute 0.71 0.10 - 0.90 10*3/mm3    Eosinophils, Absolute 0.45 (H) 0.00 - 0.40 10*3/mm3    Basophils, Absolute 0.06 0.00 - 0.20 10*3/mm3    Immature Grans, Absolute 0.04 0.00 - 0.05 10*3/mm3    nRBC 0.0 0.0 - 0.2 /100 WBC   Uric Acid    Collection Time: 11/20/19  2:20 AM   Result Value Ref Range    Uric Acid 5.5 2.4 - 5.7 mg/dL   Basic Metabolic Panel    Collection Time: 11/20/19  2:20 AM   Result Value Ref Range    Glucose 130 (H) 65 - 99 mg/dL    BUN 13 6 - 20 mg/dL    Creatinine 0.78 0.57 - 1.00 mg/dL    Sodium 139 136 - 145 mmol/L    Potassium 4.0 3.5 - 5.2 mmol/L    Chloride 100 98 - 107 mmol/L    CO2 26.0 22.0 - 29.0 mmol/L    Calcium 9.0 8.6 - 10.5 mg/dL    eGFR Non African Amer 82 >60 mL/min/1.73    BUN/Creatinine Ratio 16.7 7.0 - 25.0    Anion Gap 13.0 5.0 - 15.0 mmol/L     Note: In addition to lab results from this visit, the labs listed above may include labs taken at another facility or during a different encounter within the last 24 hours. Please correlate lab times with ED admission and discharge times for further clarification of the services performed during this visit.    XR Foot 3+ View Right   Final Result   Negative right foot.      Signer Name: Nikita Jones MD    Signed: 11/20/2019 4:00 AM    Workstation Name: [id number]     [facility]        Vitals:    11/20/19 0206 11/20/19 0405 11/20/19 0416   BP: (!) 180/102 170/88    BP Location: Right arm     Patient Position: Sitting     Pulse: 91  87   Resp: 18     Temp: 98.1 °F (36.7 °C)     TempSrc: Oral     SpO2: 97% 95%    Weight: (!) 195 kg (430 lb)     Height: 172.7 cm (68\")       Medications   HYDROcodone-acetaminophen (NORCO) 5-325 MG per tablet 1 tablet (1 tablet Oral Given 11/20/19 0229)   ibuprofen (ADVIL,MOTRIN) tablet 800 mg (800 mg " Oral Given 11/20/19 9289)     ECG/EMG Results (last 24 hours)     ** No results found for the last 24 hours. **        No orders to display               MDM    Final diagnoses:   Pain of toe of right foot              Celi Simental, APRN  11/20/19 041

## 2020-02-13 DIAGNOSIS — F41.9 ANXIETY: ICD-10-CM

## 2020-02-14 RX ORDER — BUSPIRONE HYDROCHLORIDE 10 MG/1
TABLET ORAL
Qty: 45 TABLET | Refills: 1 | OUTPATIENT
Start: 2020-02-14

## 2020-02-27 DIAGNOSIS — F41.9 ANXIETY: ICD-10-CM

## 2020-02-28 RX ORDER — BUSPIRONE HYDROCHLORIDE 10 MG/1
TABLET ORAL
Qty: 45 TABLET | Refills: 1 | OUTPATIENT
Start: 2020-02-28

## 2020-06-22 ENCOUNTER — OFFICE VISIT (OUTPATIENT)
Dept: FAMILY MEDICINE CLINIC | Facility: CLINIC | Age: 40
End: 2020-06-22

## 2020-06-22 VITALS
DIASTOLIC BLOOD PRESSURE: 90 MMHG | HEIGHT: 68 IN | OXYGEN SATURATION: 96 % | BODY MASS INDEX: 44.41 KG/M2 | HEART RATE: 82 BPM | WEIGHT: 293 LBS | TEMPERATURE: 98.6 F | SYSTOLIC BLOOD PRESSURE: 138 MMHG

## 2020-06-22 DIAGNOSIS — R19.01 RIGHT UPPER QUADRANT ABDOMINAL MASS: Primary | ICD-10-CM

## 2020-06-22 PROCEDURE — 99213 OFFICE O/P EST LOW 20 MIN: CPT | Performed by: FAMILY MEDICINE

## 2020-06-22 NOTE — PROGRESS NOTES
"Subjective   Starr Ruiz is a 39 y.o. female.     Pt has tennis size ball lump on right side under right breast that she noticed about a month ago. That is sometimes painful.     Cyst   This is a new problem. The current episode started more than 1 year ago. The problem occurs constantly. The problem has been unchanged. Pertinent negatives include no abdominal pain, change in bowel habit, chest pain, myalgias, nausea, neck pain, sore throat, vertigo, visual change or weakness. Nothing aggravates the symptoms. She has tried nothing for the symptoms. The treatment provided no relief.    she has lump right upper abd not painful swelling can be uncomfortable with certain positions.  Present for months.  Is not getting noticeably larger.  No redness.  Mobile.  No drainage.        The following portions of the patient's history were reviewed and updated as appropriate: allergies, current medications, past family history, past medical history, past social history, past surgical history and problem list.    Review of Systems   HENT: Negative for sore throat.    Cardiovascular: Negative for chest pain.   Gastrointestinal: Negative for abdominal pain, change in bowel habit and nausea.   Musculoskeletal: Negative for myalgias and neck pain.   Neurological: Negative for vertigo and weakness.       Objective     Vitals:    06/22/20 1549   BP: 138/90   Pulse: 82   Temp: 98.6 °F (37 °C)   SpO2: 96%   Weight: (!) 171 kg (377 lb)   Height: 172.7 cm (68\")       Physical Exam   Constitutional: She appears well-developed and well-nourished.   Cardiovascular: Normal rate and regular rhythm.   Pulmonary/Chest: Effort normal and breath sounds normal.   Abdominal: Soft. Bowel sounds are normal. She exhibits mass. She exhibits no distension. There is no tenderness. There is no rebound and no guarding. No hernia.   Nursing note and vitals reviewed.  she has a mobile soft 10x8 cm ruq sub cut mass.  No redness.  Non tender.  No " rebound no guarding no hsm.        Assessment/Plan     Problem List Items Addressed This Visit        Other    Right upper quadrant abdominal mass - Primary    Relevant Orders    CT Abdomen Pelvis Without Contrast        I suspect she has a lipoma however hernia should be ruled out.  Will get ct ab/pelvis     Answers for HPI/ROS submitted by the patient on 6/19/2020   What is the primary reason for your visit?: Other  Please describe your symptoms.: I have a lump on my right side below my breast  Have you had these symptoms before?: No  How long have you been having these symptoms?: Greater than 2 weeks  Please list any medications you are currently taking for this condition.: None  Please describe any probable cause for these symptoms. : Some mild pain periodically

## 2020-06-30 ENCOUNTER — HOSPITAL ENCOUNTER (OUTPATIENT)
Dept: CT IMAGING | Facility: HOSPITAL | Age: 40
Discharge: HOME | End: 2020-06-30
Admitting: FAMILY MEDICINE

## 2020-06-30 DIAGNOSIS — R19.01 RIGHT UPPER QUADRANT ABDOMINAL MASS: ICD-10-CM

## 2020-06-30 PROCEDURE — 74176 CT ABD & PELVIS W/O CONTRAST: CPT

## 2020-07-02 ENCOUNTER — TELEPHONE (OUTPATIENT)
Dept: FAMILY MEDICINE CLINIC | Facility: CLINIC | Age: 40
End: 2020-07-02

## 2020-07-02 NOTE — TELEPHONE ENCOUNTER
Notified pt that test result has not been signed off on yet, will notify when reviewed.  BBailey, CMA

## 2020-10-22 DIAGNOSIS — I10 ESSENTIAL HYPERTENSION: ICD-10-CM

## 2020-10-22 DIAGNOSIS — F41.9 ANXIETY: ICD-10-CM

## 2020-10-26 RX ORDER — HYDROCHLOROTHIAZIDE 12.5 MG/1
TABLET ORAL
Qty: 90 TABLET | Refills: 3 | Status: SHIPPED | OUTPATIENT
Start: 2020-10-26 | End: 2021-05-10 | Stop reason: SDUPTHER

## 2020-10-26 RX ORDER — ESCITALOPRAM OXALATE 10 MG/1
TABLET ORAL
Qty: 90 TABLET | Refills: 3 | Status: SHIPPED | OUTPATIENT
Start: 2020-10-26 | End: 2021-05-10 | Stop reason: SDUPTHER

## 2020-10-26 RX ORDER — LOSARTAN POTASSIUM 100 MG/1
TABLET ORAL
Qty: 90 TABLET | Refills: 3 | Status: SHIPPED | OUTPATIENT
Start: 2020-10-26 | End: 2021-05-10 | Stop reason: SDUPTHER

## 2021-05-10 ENCOUNTER — OFFICE VISIT (OUTPATIENT)
Dept: FAMILY MEDICINE CLINIC | Facility: CLINIC | Age: 41
End: 2021-05-10

## 2021-05-10 VITALS
WEIGHT: 293 LBS | DIASTOLIC BLOOD PRESSURE: 80 MMHG | HEIGHT: 68 IN | TEMPERATURE: 98.7 F | HEART RATE: 91 BPM | BODY MASS INDEX: 44.41 KG/M2 | SYSTOLIC BLOOD PRESSURE: 140 MMHG | OXYGEN SATURATION: 98 %

## 2021-05-10 DIAGNOSIS — Z00.00 ANNUAL PHYSICAL EXAM: Primary | ICD-10-CM

## 2021-05-10 DIAGNOSIS — F41.9 ANXIETY: ICD-10-CM

## 2021-05-10 DIAGNOSIS — G47.00 INSOMNIA, UNSPECIFIED TYPE: ICD-10-CM

## 2021-05-10 DIAGNOSIS — Z72.0 TOBACCO ABUSE: Chronic | ICD-10-CM

## 2021-05-10 DIAGNOSIS — E66.01 MORBID OBESITY DUE TO EXCESS CALORIES (HCC): Chronic | ICD-10-CM

## 2021-05-10 DIAGNOSIS — I10 ESSENTIAL HYPERTENSION: Chronic | ICD-10-CM

## 2021-05-10 DIAGNOSIS — Z12.4 CERVICAL CANCER SCREENING: ICD-10-CM

## 2021-05-10 DIAGNOSIS — Z12.39 ENCOUNTER FOR SCREENING FOR MALIGNANT NEOPLASM OF BREAST, UNSPECIFIED SCREENING MODALITY: ICD-10-CM

## 2021-05-10 LAB
BILIRUB BLD-MCNC: NEGATIVE MG/DL
CLARITY, POC: CLEAR
COLOR UR: YELLOW
EXPIRATION DATE: NORMAL
GLUCOSE UR STRIP-MCNC: NEGATIVE MG/DL
KETONES UR QL: NEGATIVE
LEUKOCYTE EST, POC: NEGATIVE
Lab: NORMAL
NITRITE UR-MCNC: NEGATIVE MG/ML
PH UR: 5.5 [PH] (ref 5–8)
PROT UR STRIP-MCNC: NEGATIVE MG/DL
RBC # UR STRIP: NEGATIVE /UL
SP GR UR: 1.03 (ref 1–1.03)
UROBILINOGEN UR QL: NORMAL

## 2021-05-10 PROCEDURE — 99396 PREV VISIT EST AGE 40-64: CPT | Performed by: FAMILY MEDICINE

## 2021-05-10 PROCEDURE — 81003 URINALYSIS AUTO W/O SCOPE: CPT | Performed by: FAMILY MEDICINE

## 2021-05-10 RX ORDER — HYDROCHLOROTHIAZIDE 12.5 MG/1
12.5 TABLET ORAL DAILY
Qty: 90 TABLET | Refills: 3 | Status: SHIPPED | OUTPATIENT
Start: 2021-05-10 | End: 2022-01-17 | Stop reason: SDUPTHER

## 2021-05-10 RX ORDER — LOSARTAN POTASSIUM 100 MG/1
100 TABLET ORAL DAILY
Qty: 90 TABLET | Refills: 3 | Status: SHIPPED | OUTPATIENT
Start: 2021-05-10 | End: 2021-10-15 | Stop reason: SDUPTHER

## 2021-05-10 RX ORDER — ESCITALOPRAM OXALATE 10 MG/1
10 TABLET ORAL DAILY
Qty: 90 TABLET | Refills: 3 | Status: SHIPPED | OUTPATIENT
Start: 2021-05-10 | End: 2021-10-15 | Stop reason: SDUPTHER

## 2021-05-10 RX ORDER — TRAZODONE HYDROCHLORIDE 50 MG/1
150 TABLET ORAL NIGHTLY
Qty: 90 TABLET | Refills: 1 | Status: SHIPPED | OUTPATIENT
Start: 2021-05-10 | End: 2021-07-12

## 2021-05-10 NOTE — PROGRESS NOTES
Subjective   Starr Ruiz is a 40 y.o. female and is here for a comprehensive physical exam. The patient reports no problems. Patient reports last physical date of over 1 year    Do you take any herbs or supplements that were not prescribed by a doctor? no  Are you taking calcium supplements? no  Are you taking aspirin daily? no  Family history of ovarian cancer? no  Family history of breast cancer? great aunt  FH of endometrial cancer? no  FH of cervical cancer? no  FH of colon cancer? no    Cancer Screening  Mammogram up-to-date?  yes  If yes, last exam date: order today  BMD up-to-date? no  If yes, last exam date: na  Colonoscopy up-to-date? no   If yes, last exam date: na  Pap up-to-date? no   If yes, last exam date: per gyn     History:  LMP: No LMP recorded. Patient has had an ablation.  Menopause at na years  Last pap date: 2-3 years  Abnormal pap? many years LEEP   : 6  Para: 4    Immunization History  Tdap? 17  HPV? not applicable  Pneumonia? yes  Shingles? not applicable    The following portions of the patient's history were reviewed and updated as appropriate: allergies, current medications, past family history, past medical history, past social history, past surgical history and problem list.    Past Medical History:   Diagnosis Date   • Anxiety    • Hypertension        Family History   Problem Relation Age of Onset   • No Known Problems Mother    • No Known Problems Father    • Breast cancer Other         age unknown    • Endometrial cancer Neg Hx    • Ovarian cancer Neg Hx        Past Surgical History:   Procedure Laterality Date   • D & C HYSTEROSCOPY WITH NOVASURE ENDOMETRIAL ABLATION AND MYOSURE     • LEEP     • TOOTH EXTRACTION     • TUBAL ABDOMINAL LIGATION         Social History     Socioeconomic History   • Marital status: Single     Spouse name: Not on file   • Number of children: Not on file   • Years of education: Not on file   • Highest education level: Not on  file   Tobacco Use   • Smoking status: Current Every Day Smoker     Packs/day: 1.00   • Smokeless tobacco: Never Used   Substance and Sexual Activity   • Alcohol use: Yes     Comment: rare   • Drug use: No   • Sexual activity: Yes     Partners: Male     Birth control/protection: None       Review of Systems  Do you have pain that bothers you in your daily life? no  Review of Systems   Constitutional: Negative.    HENT: Negative.    Eyes: Negative.    Respiratory: Negative.    Cardiovascular: Negative.    Gastrointestinal: Negative.    Endocrine: Negative.    Genitourinary: Negative.    Musculoskeletal: Negative.    Skin: Negative.    Allergic/Immunologic: Negative.    Neurological: Negative.    Hematological: Negative.    Psychiatric/Behavioral: Negative.    All other systems reviewed and are negative.      Objective   Physical Exam  Vitals and nursing note reviewed.   Constitutional:       Appearance: She is well-developed.   HENT:      Head: Normocephalic and atraumatic.   Eyes:      General:         Right eye: No discharge.         Left eye: No discharge.      Pupils: Pupils are equal, round, and reactive to light.   Cardiovascular:      Rate and Rhythm: Normal rate and regular rhythm.      Heart sounds: Normal heart sounds.   Pulmonary:      Effort: Pulmonary effort is normal.      Breath sounds: Normal breath sounds.   Abdominal:      General: Bowel sounds are normal.      Palpations: Abdomen is soft. There is no mass.      Tenderness: There is no abdominal tenderness.   Musculoskeletal:         General: Normal range of motion.      Right shoulder: No swelling.      Cervical back: Normal range of motion and neck supple.   Skin:     General: Skin is warm and dry.      Nails: There is no clubbing.   Neurological:      Mental Status: She is alert and oriented to person, place, and time.      Deep Tendon Reflexes: Reflexes are normal and symmetric.   Psychiatric:         Behavior: Behavior normal.         Thought  Content: Thought content normal.         Judgment: Judgment normal.          Assessment/Plan   Healthy female exam.      1.   Problem List Items Addressed This Visit        Cardiac and Vasculature    Essential hypertension (Chronic)    Relevant Medications    hydroCHLOROthiazide (HYDRODIURIL) 12.5 MG tablet    losartan (COZAAR) 100 MG tablet       Endocrine and Metabolic    Morbid obesity due to excess calories (CMS/HCC) (Chronic)    Current Assessment & Plan     Patient's (Body mass index is 56.71 kg/m².) indicates that they are morbidly obese (BMI > 40 or > 35 with obesity - related health condition). Obesity-related health conditions include the following: hypertension. Obesity is unchanged. BMI is is above average; BMI management plan is completed. We discussed portion control and increasing exercise. with obesity-related health conditions that include hypertension . Obesity is unchanged. BMI is is above average; BMI management plan is completed. We discussed portion control and increasing exercise.             Genitourinary and Reproductive     Cervical cancer screening    Relevant Orders    Ambulatory Referral to Obstetrics / Gynecology    Encounter for screening for malignant neoplasm of breast    Relevant Orders    Mammo Screening Digital Tomosynthesis Bilateral With CAD       Health Encounters    Annual physical exam - Primary    Relevant Orders    POCT urinalysis dipstick, automated (Completed)    CBC & Differential    TSH    Comprehensive Metabolic Panel    Hemoglobin A1c    Lipid Panel    Vitamin D 25 Hydroxy    Vitamin B12    Follicle Stimulating Hormone       Mental Health    Anxiety    Relevant Medications    escitalopram (LEXAPRO) 10 MG tablet       Sleep    Insomnia    Relevant Medications    traZODone (DESYREL) 50 MG tablet       Tobacco    Tobacco abuse (Chronic)            2. Patient Counseling:  --Nutrition: Stressed importance of moderation in sodium/caffeine intake, saturated fat and  cholesterol, caloric balance, sufficient intake of fresh fruits, vegetables, fiber, calcium, iron, and 1 mg of folate supplement per day (for females capable of pregnancy).  --Discussed the issue of estrogen replacement, calcium supplement, and the daily use of baby aspirin.  --Exercise: Stressed the importance of regular exercise.   --Substance Abuse: Discussed cessation/primary prevention of tobacco, alcohol, or other drug use; driving or other dangerous activities under the influence; availability of treatment for abuse.    --Sexuality: Discussed sexually transmitted diseases, partner selection, use of condoms, avoidance of unintended pregnancy  and contraceptive alternatives.   --Injury prevention: Discussed safety belts, safety helmets, smoke detector, smoking near bedding or upholstery.   --Dental health: Discussed importance of regular tooth brushing, flossing, and dental visits.  --Immunizations reviewed.  --Discussed benefits of screening colonoscopy.  --After hours service discussed with patient    3. Discussed the patient's BMI with her.  The BMI is above average; BMI management plan is completed  4. Follow up in 6 months

## 2021-05-10 NOTE — PATIENT INSTRUCTIONS
Obesity, Adult  Obesity is having too much body fat. Being obese means that your weight is more than what is healthy for you.  BMI is a number that explains how much body fat you have. If you have a BMI of 30 or more, you are obese. Obesity is often caused by eating or drinking more calories than your body uses. Changing your lifestyle can help you lose weight.  Obesity can cause serious health problems, such as:  · Stroke.  · Coronary artery disease (CAD).  · Type 2 diabetes.  · Some types of cancer, including cancers of the colon, breast, uterus, and gallbladder.  · Osteoarthritis.  · High blood pressure (hypertension).  · High cholesterol.  · Sleep apnea.  · Gallbladder stones.  · Infertility problems.  What are the causes?  · Eating meals each day that are high in calories, sugar, and fat.  · Being born with genes that may make you more likely to become obese.  · Having a medical condition that causes obesity.  · Taking certain medicines.  · Sitting a lot (having a sedentary lifestyle).  · Not getting enough sleep.  · Drinking a lot of drinks that have sugar in them.  What increases the risk?  · Having a family history of obesity.  · Being an  woman.  · Being a  man.  · Living in an area with limited access to:  ? [place], recreation centers, or sidewalks.  ? Healthy food choices, such as grocery stores and [place] markets.  What are the signs or symptoms?  The main sign is having too much body fat.  How is this treated?  · Treatment for this condition often includes changing your lifestyle. Treatment may include:  ? Changing your diet. This may include making a healthy meal plan.  ? Exercise. This may include activity that causes your heart to beat faster (aerobic exercise) and strength training. Work with your doctor to design a program that works for you.  ? Medicine to help you lose weight. This may be used if you are not able to lose 1 pound a week after 6 weeks of healthy eating and  more exercise.  ? Treating conditions that cause the obesity.  ? Surgery. Options may include gastric banding and gastric bypass. This may be done if:  § Other treatments have not helped to improve your condition.  § You have a BMI of 40 or higher.  § You have life-threatening health problems related to obesity.  Follow these instructions at home:  Eating and drinking    · Follow advice from your doctor about what to eat and drink. Your doctor may tell you to:  ? Limit fast food, sweets, and processed snack foods.  ? Choose low-fat options. For example, choose low-fat milk instead of whole milk.  ? Eat 5 or more servings of fruits or vegetables each day.  ? Eat at home more often. This gives you more control over what you eat.  ? Choose healthy foods when you eat out.  ? Learn to read food labels. This will help you learn how much food is in 1 serving.  ? Keep low-fat snacks available.  ? Avoid drinks that have a lot of sugar in them. These include soda, fruit juice, iced tea with sugar, and flavored milk.  · Drink enough water to keep your pee (urine) pale yellow.  · Do not go on fad diets.  Physical activity  · Exercise often, as told by your doctor. Most adults should get up to 150 minutes of moderate-intensity exercise every week.Ask your doctor:  ? What types of exercise are safe for you.  ? How often you should exercise.  · Warm up and stretch before being active.  · Do slow stretching after being active (cool down).  · Rest between times of being active.  Lifestyle  · Work with your doctor and a food expert (dietitian) to set a weight-loss goal that is best for you.  · Limit your screen time.  · Find ways to reward yourself that do not involve food.  · Do not drink alcohol if:  ? Your doctor tells you not to drink.  ? You are pregnant, may be pregnant, or are planning to become pregnant.  · If you drink alcohol:  ? Limit how much you use to:  § 0-1 drink a day for women.  § 0-2 drinks a day for men.  ? Be  aware of how much alcohol is in your drink. In the [place] one drink equals one 12 oz bottle of beer (355 mL), one 5 oz glass of wine (148 mL), or one 1½ oz glass of hard liquor (44 mL).  General instructions  · Keep a weight-loss journal. This can help you keep track of:  ? The food that you eat.  ? How much exercise you get.  · Take over-the-counter and prescription medicines only as told by your doctor.  · Take vitamins and supplements only as told by your doctor.  · Think about joining a support group.  · Keep all follow-up visits as told by your doctor. This is important.  Contact a doctor if:  · You cannot meet your weight loss goal after you have changed your diet and lifestyle for 6 weeks.  Get help right away if you:  · Are having trouble breathing.  · Are having thoughts of harming yourself.  Summary  · Obesity is having too much body fat.  · Being obese means that your weight is more than what is healthy for you.  · Work with your doctor to set a weight-loss goal.  · Get regular exercise as told by your doctor.  This information is not intended to replace advice given to you by your health care provider. Make sure you discuss any questions you have with your health care provider.  Document Revised: 08/22/2019 Document Reviewed: 08/22/2019  [employer] Patient Education © 2021 [employer]  Preventive Care 40-64 Years Old, Female  Preventive care refers to visits with your health care provider and lifestyle choices that can promote health and wellness. This includes:  · A yearly physical exam. This may also be called an annual well check.  · Regular dental visits and eye exams.  · Immunizations.  · Screening for certain conditions.  · Healthy lifestyle choices, such as eating a healthy diet, getting regular exercise, not using drugs or products that contain nicotine and tobacco, and limiting alcohol use.  What can I expect for my preventive care visit?  Physical exam  Your health care provider will check  your:  · Height and weight. This may be used to calculate body mass index (BMI), which tells if you are at a healthy weight.  · Heart rate and blood pressure.  · Skin for abnormal spots.  Counseling  Your health care provider may ask you questions about your:  · Alcohol, tobacco, and drug use.  · Emotional well-being.  · Home and relationship well-being.  · Sexual activity.  · Eating habits.  · Work and work environment.  · Method of birth control.  · Menstrual cycle.  · Pregnancy history.  What immunizations do I need?    Influenza (flu) vaccine  · This is recommended every year.  Tetanus, diphtheria, and pertussis (Tdap) vaccine  · You may need a Td booster every 10 years.  Varicella (chickenpox) vaccine  · You may need this if you have not been vaccinated.  Zoster (shingles) vaccine  · You may need this after age 60.  Measles, mumps, and rubella (MMR) vaccine  · You may need at least one dose of MMR if you were born in 1957 or later. You may also need a second dose.  Pneumococcal conjugate (PCV13) vaccine  · You may need this if you have certain conditions and were not previously vaccinated.  Pneumococcal polysaccharide (PPSV23) vaccine  · You may need one or two doses if you smoke cigarettes or if you have certain conditions.  Meningococcal conjugate (MenACWY) vaccine  · You may need this if you have certain conditions.  Hepatitis A vaccine  · You may need this if you have certain conditions or if you travel or work in places where you may be exposed to hepatitis A.  Hepatitis B vaccine  · You may need this if you have certain conditions or if you travel or work in places where you may be exposed to hepatitis B.  Haemophilus influenzae type b (Hib) vaccine  · You may need this if you have certain conditions.  Human papillomavirus (HPV) vaccine  · If recommended by your health care provider, you may need three doses over 6 months.  You may receive vaccines as individual doses or as more than one vaccine together  in one shot (combination vaccines). Talk with your health care provider about the risks and benefits of combination vaccines.  What tests do I need?  Blood tests  · Lipid and cholesterol levels. These may be checked every 5 years, or more frequently if you are over 50 years old.  · Hepatitis C test.  · Hepatitis B test.  Screening  · Lung cancer screening. You may have this screening every year starting at age 55 if you have a 30-pack-year history of smoking and currently smoke or have quit within the past 15 years.  · Colorectal cancer screening. All adults should have this screening starting at age 50 and continuing until age 75. Your health care provider may recommend screening at age 45 if you are at increased risk. You will have tests every 1-10 years, depending on your results and the type of screening test.  · Diabetes screening. This is done by checking your blood sugar (glucose) after you have not eaten for a while (fasting). You may have this done every 1-3 years.  · Mammogram. This may be done every 1-2 years. Talk with your health care provider about when you should start having regular mammograms. This may depend on whether you have a family history of breast cancer.  · BRCA-related cancer screening. This may be done if you have a family history of breast, ovarian, tubal, or peritoneal cancers.  · Pelvic exam and Pap test. This may be done every 3 years starting at age 21. Starting at age 30, this may be done every 5 years if you have a Pap test in combination with an HPV test.  Other tests  · Sexually transmitted disease (STD) testing.  · Bone density scan. This is done to screen for osteoporosis. You may have this scan if you are at high risk for osteoporosis.  Follow these instructions at home:  Eating and drinking  · Eat a diet that includes fresh fruits and vegetables, whole grains, lean protein, and low-fat dairy.  · Take vitamin and mineral supplements as recommended by your health care  provider.  · Do not drink alcohol if:  ? Your health care provider tells you not to drink.  ? You are pregnant, may be pregnant, or are planning to become pregnant.  · If you drink alcohol:  ? Limit how much you have to 0-1 drink a day.  ? Be aware of how much alcohol is in your drink. In the [place] one drink equals one 12 oz bottle of beer (355 mL), one 5 oz glass of wine (148 mL), or one 1½ oz glass of hard liquor (44 mL).  Lifestyle  · Take daily care of your teeth and gums.  · Stay active. Exercise for at least 30 minutes on 5 or more days each week.  · Do not use any products that contain nicotine or tobacco, such as cigarettes, e-cigarettes, and chewing tobacco. If you need help quitting, ask your health care provider.  · If you are sexually active, practice safe sex. Use a condom or other form of birth control (contraception) in order to prevent pregnancy and STIs (sexually transmitted infections).  · If told by your health care provider, take low-dose aspirin daily starting at age 50.  What's next?  · Visit your health care provider once a year for a well check visit.  · Ask your health care provider how often you should have your eyes and teeth checked.  · Stay up to date on all vaccines.  This information is not intended to replace advice given to you by your health care provider. Make sure you discuss any questions you have with your health care provider.  Document Revised: 08/29/2019 Document Reviewed: 08/29/2019  [employer] Patient Education © 2020 [employer]

## 2021-05-10 NOTE — ASSESSMENT & PLAN NOTE
Patient's (Body mass index is 56.71 kg/m².) indicates that they are morbidly obese (BMI > 40 or > 35 with obesity - related health condition). Obesity-related health conditions include the following: hypertension. Obesity is unchanged. BMI is is above average; BMI management plan is completed. We discussed portion control and increasing exercise. with obesity-related health conditions that include hypertension . Obesity is unchanged. BMI is is above average; BMI management plan is completed. We discussed portion control and increasing exercise.

## 2021-05-13 ENCOUNTER — LAB (OUTPATIENT)
Dept: FAMILY MEDICINE CLINIC | Facility: CLINIC | Age: 41
End: 2021-05-13

## 2021-05-13 DIAGNOSIS — Z00.00 ANNUAL PHYSICAL EXAM: ICD-10-CM

## 2021-05-13 LAB
25(OH)D3 SERPL-MCNC: 14 NG/ML
ALBUMIN SERPL-MCNC: 4 G/DL (ref 3.5–5.2)
ALBUMIN/GLOB SERPL: 1.3 G/DL
ALP SERPL-CCNC: 80 U/L (ref 39–117)
ALT SERPL W P-5'-P-CCNC: 11 U/L (ref 1–33)
ANION GAP SERPL CALCULATED.3IONS-SCNC: 11.7 MMOL/L (ref 5–15)
AST SERPL-CCNC: 8 U/L (ref 1–32)
BASOPHILS # BLD AUTO: 0.1 10*3/MM3 (ref 0–0.2)
BASOPHILS NFR BLD AUTO: 0.9 % (ref 0–1.5)
BILIRUB SERPL-MCNC: 0.2 MG/DL (ref 0–1.2)
BUN SERPL-MCNC: 10 MG/DL (ref 6–20)
BUN/CREAT SERPL: 11.6 (ref 7–25)
CALCIUM SPEC-SCNC: 9.5 MG/DL (ref 8.6–10.5)
CHLORIDE SERPL-SCNC: 103 MMOL/L (ref 98–107)
CHOLEST SERPL-MCNC: 189 MG/DL (ref 0–200)
CO2 SERPL-SCNC: 27.3 MMOL/L (ref 22–29)
CREAT SERPL-MCNC: 0.86 MG/DL (ref 0.57–1)
DEPRECATED RDW RBC AUTO: 44.7 FL (ref 37–54)
EOSINOPHIL # BLD AUTO: 0.41 10*3/MM3 (ref 0–0.4)
EOSINOPHIL NFR BLD AUTO: 3.6 % (ref 0.3–6.2)
ERYTHROCYTE [DISTWIDTH] IN BLOOD BY AUTOMATED COUNT: 13 % (ref 12.3–15.4)
FSH SERPL-ACNC: 11 MIU/ML
GFR SERPL CREATININE-BSD FRML MDRD: 73 ML/MIN/1.73
GLOBULIN UR ELPH-MCNC: 3.1 GM/DL
GLUCOSE SERPL-MCNC: 113 MG/DL (ref 65–99)
HBA1C MFR BLD: 5.09 % (ref 4.8–5.6)
HCT VFR BLD AUTO: 44.9 % (ref 34–46.6)
HDLC SERPL-MCNC: 30 MG/DL (ref 40–60)
HGB BLD-MCNC: 15.1 G/DL (ref 12–15.9)
IMM GRANULOCYTES # BLD AUTO: 0.07 10*3/MM3 (ref 0–0.05)
IMM GRANULOCYTES NFR BLD AUTO: 0.6 % (ref 0–0.5)
LDLC SERPL CALC-MCNC: 92 MG/DL (ref 0–100)
LDLC/HDLC SERPL: 2.63 {RATIO}
LYMPHOCYTES # BLD AUTO: 1.95 10*3/MM3 (ref 0.7–3.1)
LYMPHOCYTES NFR BLD AUTO: 17.2 % (ref 19.6–45.3)
MCH RBC QN AUTO: 31.1 PG (ref 26.6–33)
MCHC RBC AUTO-ENTMCNC: 33.6 G/DL (ref 31.5–35.7)
MCV RBC AUTO: 92.6 FL (ref 79–97)
MONOCYTES # BLD AUTO: 0.65 10*3/MM3 (ref 0.1–0.9)
MONOCYTES NFR BLD AUTO: 5.7 % (ref 5–12)
NEUTROPHILS NFR BLD AUTO: 72 % (ref 42.7–76)
NEUTROPHILS NFR BLD AUTO: 8.15 10*3/MM3 (ref 1.7–7)
NRBC BLD AUTO-RTO: 0 /100 WBC (ref 0–0.2)
PLATELET # BLD AUTO: 305 10*3/MM3 (ref 140–450)
PMV BLD AUTO: 9 FL (ref 6–12)
POTASSIUM SERPL-SCNC: 4.8 MMOL/L (ref 3.5–5.2)
PROT SERPL-MCNC: 7.1 G/DL (ref 6–8.5)
RBC # BLD AUTO: 4.85 10*6/MM3 (ref 3.77–5.28)
SODIUM SERPL-SCNC: 142 MMOL/L (ref 136–145)
TRIGL SERPL-MCNC: 401 MG/DL (ref 0–150)
TSH SERPL DL<=0.05 MIU/L-ACNC: 2.8 UIU/ML (ref 0.27–4.2)
VIT B12 BLD-MCNC: 476 PG/ML (ref 211–946)
VLDLC SERPL-MCNC: 67 MG/DL (ref 5–40)
WBC # BLD AUTO: 11.33 10*3/MM3 (ref 3.4–10.8)

## 2021-05-13 PROCEDURE — 85025 COMPLETE CBC W/AUTO DIFF WBC: CPT | Performed by: FAMILY MEDICINE

## 2021-05-13 PROCEDURE — 80061 LIPID PANEL: CPT | Performed by: FAMILY MEDICINE

## 2021-05-13 PROCEDURE — 83036 HEMOGLOBIN GLYCOSYLATED A1C: CPT | Performed by: FAMILY MEDICINE

## 2021-05-13 PROCEDURE — 82306 VITAMIN D 25 HYDROXY: CPT | Performed by: FAMILY MEDICINE

## 2021-05-13 PROCEDURE — 82607 VITAMIN B-12: CPT | Performed by: FAMILY MEDICINE

## 2021-05-13 PROCEDURE — 80053 COMPREHEN METABOLIC PANEL: CPT | Performed by: FAMILY MEDICINE

## 2021-05-13 PROCEDURE — 83001 ASSAY OF GONADOTROPIN (FSH): CPT | Performed by: FAMILY MEDICINE

## 2021-05-13 PROCEDURE — 84443 ASSAY THYROID STIM HORMONE: CPT | Performed by: FAMILY MEDICINE

## 2021-05-18 ENCOUNTER — TELEPHONE (OUTPATIENT)
Dept: FAMILY MEDICINE CLINIC | Facility: CLINIC | Age: 41
End: 2021-05-18

## 2021-05-18 NOTE — TELEPHONE ENCOUNTER
Caller: Starr Ruiz    Relationship: Self    Best call back number:[phone]    Caller requesting test results: YES    When was the test performed: 05/13/2021    Additional notes: PATIENT WOULD LIKE A CALL BACK REGARDING THE RESULTS OF HER LABS DRAWN 05/13/2021

## 2021-07-12 DIAGNOSIS — G47.00 INSOMNIA, UNSPECIFIED TYPE: ICD-10-CM

## 2021-07-12 RX ORDER — TRAZODONE HYDROCHLORIDE 50 MG/1
TABLET ORAL
Qty: 90 TABLET | Refills: 0 | Status: SHIPPED | OUTPATIENT
Start: 2021-07-12 | End: 2021-08-09

## 2021-07-14 ENCOUNTER — APPOINTMENT (OUTPATIENT)
Dept: MAMMOGRAPHY | Facility: HOSPITAL | Age: 41
End: 2021-07-14

## 2021-08-08 DIAGNOSIS — G47.00 INSOMNIA, UNSPECIFIED TYPE: ICD-10-CM

## 2021-08-09 RX ORDER — TRAZODONE HYDROCHLORIDE 50 MG/1
TABLET ORAL
Qty: 90 TABLET | Refills: 0 | Status: SHIPPED | OUTPATIENT
Start: 2021-08-09 | End: 2021-09-15 | Stop reason: SDUPTHER

## 2021-08-11 ENCOUNTER — HOSPITAL ENCOUNTER (OUTPATIENT)
Dept: MAMMOGRAPHY | Facility: HOSPITAL | Age: 41
Discharge: HOME | End: 2021-08-11
Admitting: FAMILY MEDICINE

## 2021-08-11 DIAGNOSIS — Z12.39 ENCOUNTER FOR SCREENING FOR MALIGNANT NEOPLASM OF BREAST, UNSPECIFIED SCREENING MODALITY: ICD-10-CM

## 2021-08-11 PROCEDURE — 77067 SCR MAMMO BI INCL CAD: CPT

## 2021-08-11 PROCEDURE — 77063 BREAST TOMOSYNTHESIS BI: CPT | Performed by: RADIOLOGY

## 2021-08-11 PROCEDURE — 77063 BREAST TOMOSYNTHESIS BI: CPT

## 2021-08-11 PROCEDURE — 77067 SCR MAMMO BI INCL CAD: CPT | Performed by: RADIOLOGY

## 2021-09-01 LAB
ALBUMIN SERPL-MCNC: 4 G/DL (ref 3.5–5.2)
ALBUMIN/GLOB SERPL: 1.3 G/DL
ALP SERPL-CCNC: 83 U/L (ref 39–117)
ALT SERPL W P-5'-P-CCNC: 11 U/L (ref 1–33)
ANION GAP SERPL CALCULATED.3IONS-SCNC: 11 MMOL/L (ref 5–15)
AST SERPL-CCNC: 11 U/L (ref 1–32)
BASOPHILS # BLD AUTO: 0.09 10*3/MM3 (ref 0–0.2)
BASOPHILS NFR BLD AUTO: 0.9 % (ref 0–1.5)
BILIRUB SERPL-MCNC: <0.2 MG/DL (ref 0–1.2)
BUN SERPL-MCNC: 10 MG/DL (ref 6–20)
BUN/CREAT SERPL: 11.8 (ref 7–25)
CALCIUM SPEC-SCNC: 9.3 MG/DL (ref 8.6–10.5)
CHLORIDE SERPL-SCNC: 101 MMOL/L (ref 98–107)
CO2 SERPL-SCNC: 23 MMOL/L (ref 22–29)
CREAT SERPL-MCNC: 0.85 MG/DL (ref 0.57–1)
DEPRECATED RDW RBC AUTO: 49.6 FL (ref 37–54)
EOSINOPHIL # BLD AUTO: 0.54 10*3/MM3 (ref 0–0.4)
EOSINOPHIL NFR BLD AUTO: 5.3 % (ref 0.3–6.2)
ERYTHROCYTE [DISTWIDTH] IN BLOOD BY AUTOMATED COUNT: 14.1 % (ref 12.3–15.4)
GFR SERPL CREATININE-BSD FRML MDRD: 74 ML/MIN/1.73
GLOBULIN UR ELPH-MCNC: 3.1 GM/DL
GLUCOSE SERPL-MCNC: 134 MG/DL (ref 65–99)
HCT VFR BLD AUTO: 40.4 % (ref 34–46.6)
HGB BLD-MCNC: 13.1 G/DL (ref 12–15.9)
HOLD SPECIMEN: NORMAL
HOLD SPECIMEN: NORMAL
IMM GRANULOCYTES # BLD AUTO: 0.08 10*3/MM3 (ref 0–0.05)
IMM GRANULOCYTES NFR BLD AUTO: 0.8 % (ref 0–0.5)
LYMPHOCYTES # BLD AUTO: 2.22 10*3/MM3 (ref 0.7–3.1)
LYMPHOCYTES NFR BLD AUTO: 21.9 % (ref 19.6–45.3)
MCH RBC QN AUTO: 31.3 PG (ref 26.6–33)
MCHC RBC AUTO-ENTMCNC: 32.4 G/DL (ref 31.5–35.7)
MCV RBC AUTO: 96.7 FL (ref 79–97)
MONOCYTES # BLD AUTO: 0.53 10*3/MM3 (ref 0.1–0.9)
MONOCYTES NFR BLD AUTO: 5.2 % (ref 5–12)
NEUTROPHILS NFR BLD AUTO: 6.67 10*3/MM3 (ref 1.7–7)
NEUTROPHILS NFR BLD AUTO: 65.9 % (ref 42.7–76)
NRBC BLD AUTO-RTO: 0 /100 WBC (ref 0–0.2)
NT-PROBNP SERPL-MCNC: 36.3 PG/ML (ref 0–450)
PLATELET # BLD AUTO: 265 10*3/MM3 (ref 140–450)
PMV BLD AUTO: 8.6 FL (ref 6–12)
POTASSIUM SERPL-SCNC: 4.1 MMOL/L (ref 3.5–5.2)
PROT SERPL-MCNC: 7.1 G/DL (ref 6–8.5)
RBC # BLD AUTO: 4.18 10*6/MM3 (ref 3.77–5.28)
SODIUM SERPL-SCNC: 135 MMOL/L (ref 136–145)
TROPONIN T SERPL-MCNC: <0.01 NG/ML (ref 0–0.03)
WBC # BLD AUTO: 10.13 10*3/MM3 (ref 3.4–10.8)
WHOLE BLOOD HOLD SPECIMEN: NORMAL
WHOLE BLOOD HOLD SPECIMEN: NORMAL

## 2021-09-01 PROCEDURE — 84484 ASSAY OF TROPONIN QUANT: CPT

## 2021-09-01 PROCEDURE — 83605 ASSAY OF LACTIC ACID: CPT | Performed by: PHYSICIAN ASSISTANT

## 2021-09-01 PROCEDURE — 86140 C-REACTIVE PROTEIN: CPT | Performed by: PHYSICIAN ASSISTANT

## 2021-09-01 PROCEDURE — 80053 COMPREHEN METABOLIC PANEL: CPT

## 2021-09-01 PROCEDURE — 93005 ELECTROCARDIOGRAM TRACING: CPT | Performed by: EMERGENCY MEDICINE

## 2021-09-01 PROCEDURE — 84145 PROCALCITONIN (PCT): CPT | Performed by: PHYSICIAN ASSISTANT

## 2021-09-01 PROCEDURE — 83615 LACTATE (LD) (LDH) ENZYME: CPT | Performed by: PHYSICIAN ASSISTANT

## 2021-09-01 PROCEDURE — 93005 ELECTROCARDIOGRAM TRACING: CPT

## 2021-09-01 PROCEDURE — 85025 COMPLETE CBC W/AUTO DIFF WBC: CPT

## 2021-09-01 PROCEDURE — 81025 URINE PREGNANCY TEST: CPT | Performed by: EMERGENCY MEDICINE

## 2021-09-01 PROCEDURE — 99284 EMERGENCY DEPT VISIT MOD MDM: CPT

## 2021-09-01 PROCEDURE — 83880 ASSAY OF NATRIURETIC PEPTIDE: CPT

## 2021-09-01 RX ORDER — SODIUM CHLORIDE 0.9 % (FLUSH) 0.9 %
10 SYRINGE (ML) INJECTION AS NEEDED
Status: DISCONTINUED | OUTPATIENT
Start: 2021-09-01 | End: 2021-09-05 | Stop reason: HOSPADM

## 2021-09-02 ENCOUNTER — APPOINTMENT (OUTPATIENT)
Dept: CT IMAGING | Facility: HOSPITAL | Age: 41
End: 2021-09-02

## 2021-09-02 ENCOUNTER — HOSPITAL ENCOUNTER (INPATIENT)
Facility: HOSPITAL | Age: 41
LOS: 3 days | Discharge: HOME | End: 2021-09-05
Attending: EMERGENCY MEDICINE | Admitting: INTERNAL MEDICINE

## 2021-09-02 ENCOUNTER — APPOINTMENT (OUTPATIENT)
Dept: GENERAL RADIOLOGY | Facility: HOSPITAL | Age: 41
End: 2021-09-02

## 2021-09-02 DIAGNOSIS — B97.89 VIRAL RESPIRATORY INFECTION: Primary | ICD-10-CM

## 2021-09-02 DIAGNOSIS — F41.9 ANXIETY AND DEPRESSION: ICD-10-CM

## 2021-09-02 DIAGNOSIS — J98.8 VIRAL RESPIRATORY INFECTION: Primary | ICD-10-CM

## 2021-09-02 DIAGNOSIS — F32.A ANXIETY AND DEPRESSION: ICD-10-CM

## 2021-09-02 DIAGNOSIS — Z03.818 ENCNTR FOR OBS FOR SUSP EXPSR TO OTH BIOLG AGENTS RULED OUT: ICD-10-CM

## 2021-09-02 DIAGNOSIS — Z87.09 HISTORY OF ASTHMA: ICD-10-CM

## 2021-09-02 DIAGNOSIS — R06.02 SHORTNESS OF BREATH: ICD-10-CM

## 2021-09-02 DIAGNOSIS — F17.200 TOBACCO DEPENDENCE: ICD-10-CM

## 2021-09-02 DIAGNOSIS — E66.01 MORBID OBESITY (HCC): ICD-10-CM

## 2021-09-02 DIAGNOSIS — R07.89 CHEST TIGHTNESS: ICD-10-CM

## 2021-09-02 DIAGNOSIS — R09.02 HYPOXIA: ICD-10-CM

## 2021-09-02 LAB
ARTERIAL PATENCY WRIST A: ABNORMAL
ATMOSPHERIC PRESS: ABNORMAL MM[HG]
B PARAPERT DNA SPEC QL NAA+PROBE: NOT DETECTED
B PARAPERT DNA SPEC QL NAA+PROBE: NOT DETECTED
B PERT DNA SPEC QL NAA+PROBE: NOT DETECTED
B PERT DNA SPEC QL NAA+PROBE: NOT DETECTED
B-HCG UR QL: NEGATIVE
BASE EXCESS BLDA CALC-SCNC: -0.6 MMOL/L (ref 0–2)
BDY SITE: ABNORMAL
BODY TEMPERATURE: 37 C
C PNEUM DNA NPH QL NAA+NON-PROBE: NOT DETECTED
C PNEUM DNA NPH QL NAA+NON-PROBE: NOT DETECTED
CO2 BLDA-SCNC: 25.2 MMOL/L (ref 22–33)
COHGB MFR BLD: 3 % (ref 0–2)
CRP SERPL-MCNC: 1.55 MG/DL (ref 0–0.5)
D DIMER PPP FEU-MCNC: 0.31 MCGFEU/ML (ref 0–0.56)
D-LACTATE SERPL-SCNC: 1.1 MMOL/L (ref 0.5–2)
D-LACTATE SERPL-SCNC: 2.1 MMOL/L (ref 0.5–2)
EPAP: 0
FLUAV SUBTYP SPEC NAA+PROBE: NOT DETECTED
FLUBV RNA ISLT QL NAA+PROBE: NOT DETECTED
HADV DNA SPEC NAA+PROBE: NOT DETECTED
HADV DNA SPEC NAA+PROBE: NOT DETECTED
HCO3 BLDA-SCNC: 24 MMOL/L (ref 20–26)
HCOV 229E RNA SPEC QL NAA+PROBE: NOT DETECTED
HCOV 229E RNA SPEC QL NAA+PROBE: NOT DETECTED
HCOV HKU1 RNA SPEC QL NAA+PROBE: NOT DETECTED
HCOV HKU1 RNA SPEC QL NAA+PROBE: NOT DETECTED
HCOV NL63 RNA SPEC QL NAA+PROBE: NOT DETECTED
HCOV NL63 RNA SPEC QL NAA+PROBE: NOT DETECTED
HCOV OC43 RNA SPEC QL NAA+PROBE: NOT DETECTED
HCOV OC43 RNA SPEC QL NAA+PROBE: NOT DETECTED
HCT VFR BLD CALC: 41.8 %
HGB BLDA-MCNC: 13.6 G/DL (ref 14–18)
HMPV RNA NPH QL NAA+NON-PROBE: NOT DETECTED
HMPV RNA NPH QL NAA+NON-PROBE: NOT DETECTED
HOLD SPECIMEN: NORMAL
HPIV1 RNA SPEC QL NAA+PROBE: NOT DETECTED
HPIV1 RNA SPEC QL NAA+PROBE: NOT DETECTED
HPIV2 RNA SPEC QL NAA+PROBE: NOT DETECTED
HPIV2 RNA SPEC QL NAA+PROBE: NOT DETECTED
HPIV3 RNA NPH QL NAA+PROBE: NOT DETECTED
HPIV3 RNA NPH QL NAA+PROBE: NOT DETECTED
HPIV4 P GENE NPH QL NAA+PROBE: NOT DETECTED
HPIV4 P GENE NPH QL NAA+PROBE: NOT DETECTED
INHALED O2 CONCENTRATION: 28 %
INTERNAL NEGATIVE CONTROL: NEGATIVE
INTERNAL POSITIVE CONTROL: POSITIVE
IPAP: 0
LDH SERPL-CCNC: 183 U/L (ref 135–214)
Lab: NORMAL
M PNEUMO IGG SER IA-ACNC: NOT DETECTED
M PNEUMO IGG SER IA-ACNC: NOT DETECTED
METHGB BLD QL: 0.4 % (ref 0–1.5)
MODALITY: ABNORMAL
NOTE: ABNORMAL
OXYHGB MFR BLDV: 90.4 % (ref 94–99)
PAW @ PEAK INSP FLOW SETTING VENT: 0 CMH2O
PCO2 BLDA: 39 MM HG (ref 35–45)
PCO2 TEMP ADJ BLD: 39 MM HG (ref 35–45)
PH BLDA: 7.4 PH UNITS (ref 7.35–7.45)
PH, TEMP CORRECTED: 7.4 PH UNITS
PO2 BLDA: 65.1 MM HG (ref 83–108)
PO2 TEMP ADJ BLD: 65.1 MM HG (ref 83–108)
PROCALCITONIN SERPL-MCNC: 0.05 NG/ML (ref 0–0.25)
RHINOVIRUS RNA SPEC NAA+PROBE: NOT DETECTED
RHINOVIRUS RNA SPEC NAA+PROBE: NOT DETECTED
RSV RNA NPH QL NAA+NON-PROBE: NOT DETECTED
RSV RNA NPH QL NAA+NON-PROBE: NOT DETECTED
SARS-COV-2 RNA NPH QL NAA+NON-PROBE: NOT DETECTED
SARS-COV-2 RNA NPH QL NAA+NON-PROBE: NOT DETECTED
SARS-COV-2 RNA PNL SPEC NAA+PROBE: NOT DETECTED
TOTAL RATE: 0 BREATHS/MINUTE

## 2021-09-02 PROCEDURE — 99223 1ST HOSP IP/OBS HIGH 75: CPT | Performed by: INTERNAL MEDICINE

## 2021-09-02 PROCEDURE — 71275 CT ANGIOGRAPHY CHEST: CPT

## 2021-09-02 PROCEDURE — 25010000002 DEXAMETHASONE PER 1 MG: Performed by: INTERNAL MEDICINE

## 2021-09-02 PROCEDURE — 83050 HGB METHEMOGLOBIN QUAN: CPT

## 2021-09-02 PROCEDURE — 85379 FIBRIN DEGRADATION QUANT: CPT | Performed by: PHYSICIAN ASSISTANT

## 2021-09-02 PROCEDURE — 0202U NFCT DS 22 TRGT SARS-COV-2: CPT | Performed by: PHYSICIAN ASSISTANT

## 2021-09-02 PROCEDURE — 82805 BLOOD GASES W/O2 SATURATION: CPT

## 2021-09-02 PROCEDURE — 0202U NFCT DS 22 TRGT SARS-COV-2: CPT | Performed by: INTERNAL MEDICINE

## 2021-09-02 PROCEDURE — 94640 AIRWAY INHALATION TREATMENT: CPT

## 2021-09-02 PROCEDURE — 0 IOPAMIDOL PER 1 ML: Performed by: EMERGENCY MEDICINE

## 2021-09-02 PROCEDURE — 83605 ASSAY OF LACTIC ACID: CPT | Performed by: PHYSICIAN ASSISTANT

## 2021-09-02 PROCEDURE — 94799 UNLISTED PULMONARY SVC/PX: CPT

## 2021-09-02 PROCEDURE — 36600 WITHDRAWAL OF ARTERIAL BLOOD: CPT

## 2021-09-02 PROCEDURE — 25010000002 DEXAMETHASONE PER 1 MG: Performed by: PHYSICIAN ASSISTANT

## 2021-09-02 PROCEDURE — 25010000002 ENOXAPARIN PER 10 MG: Performed by: INTERNAL MEDICINE

## 2021-09-02 PROCEDURE — 71045 X-RAY EXAM CHEST 1 VIEW: CPT

## 2021-09-02 PROCEDURE — 87636 SARSCOV2 & INF A&B AMP PRB: CPT | Performed by: PHYSICIAN ASSISTANT

## 2021-09-02 PROCEDURE — 82375 ASSAY CARBOXYHB QUANT: CPT

## 2021-09-02 RX ORDER — ALBUTEROL SULFATE 90 UG/1
2 AEROSOL, METERED RESPIRATORY (INHALATION) EVERY 4 HOURS PRN
COMMUNITY
End: 2022-05-17 | Stop reason: SDUPTHER

## 2021-09-02 RX ORDER — SULFAMETHOXAZOLE AND TRIMETHOPRIM 800; 160 MG/1; MG/1
1 TABLET ORAL 2 TIMES DAILY
COMMUNITY
End: 2021-09-05 | Stop reason: HOSPADM

## 2021-09-02 RX ORDER — HYDROCHLOROTHIAZIDE 12.5 MG/1
12.5 TABLET ORAL DAILY
Status: DISCONTINUED | OUTPATIENT
Start: 2021-09-02 | End: 2021-09-03

## 2021-09-02 RX ORDER — ESCITALOPRAM OXALATE 10 MG/1
10 TABLET ORAL DAILY
Status: DISCONTINUED | OUTPATIENT
Start: 2021-09-02 | End: 2021-09-05 | Stop reason: HOSPADM

## 2021-09-02 RX ORDER — SODIUM CHLORIDE 0.9 % (FLUSH) 0.9 %
10 SYRINGE (ML) INJECTION AS NEEDED
Status: DISCONTINUED | OUTPATIENT
Start: 2021-09-02 | End: 2021-09-05 | Stop reason: HOSPADM

## 2021-09-02 RX ORDER — LOSARTAN POTASSIUM 50 MG/1
100 TABLET ORAL DAILY
Status: DISCONTINUED | OUTPATIENT
Start: 2021-09-02 | End: 2021-09-05 | Stop reason: HOSPADM

## 2021-09-02 RX ORDER — SODIUM CHLORIDE 0.9 % (FLUSH) 0.9 %
10 SYRINGE (ML) INJECTION EVERY 12 HOURS SCHEDULED
Status: DISCONTINUED | OUTPATIENT
Start: 2021-09-02 | End: 2021-09-05 | Stop reason: HOSPADM

## 2021-09-02 RX ORDER — NICOTINE 21 MG/24HR
1 PATCH, TRANSDERMAL 24 HOURS TRANSDERMAL EVERY 24 HOURS
Status: DISCONTINUED | OUTPATIENT
Start: 2021-09-02 | End: 2021-09-05 | Stop reason: HOSPADM

## 2021-09-02 RX ORDER — DEXAMETHASONE SODIUM PHOSPHATE 4 MG/ML
8 INJECTION, SOLUTION INTRA-ARTICULAR; INTRALESIONAL; INTRAMUSCULAR; INTRAVENOUS; SOFT TISSUE ONCE
Status: COMPLETED | OUTPATIENT
Start: 2021-09-02 | End: 2021-09-02

## 2021-09-02 RX ORDER — DEXAMETHASONE SODIUM PHOSPHATE 4 MG/ML
8 INJECTION, SOLUTION INTRA-ARTICULAR; INTRALESIONAL; INTRAMUSCULAR; INTRAVENOUS; SOFT TISSUE EVERY 12 HOURS
Status: DISCONTINUED | OUTPATIENT
Start: 2021-09-02 | End: 2021-09-05 | Stop reason: HOSPADM

## 2021-09-02 RX ORDER — ONDANSETRON 4 MG/1
4 TABLET, FILM COATED ORAL EVERY 6 HOURS PRN
Status: DISCONTINUED | OUTPATIENT
Start: 2021-09-02 | End: 2021-09-05 | Stop reason: HOSPADM

## 2021-09-02 RX ORDER — ACETAMINOPHEN 325 MG/1
650 TABLET ORAL EVERY 4 HOURS PRN
Status: DISCONTINUED | OUTPATIENT
Start: 2021-09-02 | End: 2021-09-05 | Stop reason: HOSPADM

## 2021-09-02 RX ORDER — ALBUTEROL SULFATE 90 UG/1
2 AEROSOL, METERED RESPIRATORY (INHALATION)
Status: DISCONTINUED | OUTPATIENT
Start: 2021-09-02 | End: 2021-09-02 | Stop reason: CLARIF

## 2021-09-02 RX ORDER — ONDANSETRON 2 MG/ML
4 INJECTION INTRAMUSCULAR; INTRAVENOUS EVERY 6 HOURS PRN
Status: DISCONTINUED | OUTPATIENT
Start: 2021-09-02 | End: 2021-09-05 | Stop reason: HOSPADM

## 2021-09-02 RX ORDER — ALBUTEROL SULFATE 2.5 MG/3ML
2.5 SOLUTION RESPIRATORY (INHALATION)
Status: DISCONTINUED | OUTPATIENT
Start: 2021-09-02 | End: 2021-09-05 | Stop reason: HOSPADM

## 2021-09-02 RX ORDER — ACETAMINOPHEN 650 MG/1
650 SUPPOSITORY RECTAL EVERY 4 HOURS PRN
Status: DISCONTINUED | OUTPATIENT
Start: 2021-09-02 | End: 2021-09-05 | Stop reason: HOSPADM

## 2021-09-02 RX ORDER — ACETAMINOPHEN 160 MG/5ML
650 SOLUTION ORAL EVERY 4 HOURS PRN
Status: DISCONTINUED | OUTPATIENT
Start: 2021-09-02 | End: 2021-09-05 | Stop reason: HOSPADM

## 2021-09-02 RX ADMIN — TRAZODONE HYDROCHLORIDE 150 MG: 100 TABLET ORAL at 20:42

## 2021-09-02 RX ADMIN — ENOXAPARIN SODIUM 40 MG: 40 INJECTION SUBCUTANEOUS at 13:37

## 2021-09-02 RX ADMIN — SODIUM CHLORIDE, PRESERVATIVE FREE 10 ML: 5 INJECTION INTRAVENOUS at 20:42

## 2021-09-02 RX ADMIN — HYDROCHLOROTHIAZIDE 12.5 MG: 12.5 CAPSULE ORAL at 15:48

## 2021-09-02 RX ADMIN — ACETAMINOPHEN 650 MG: 325 TABLET, FILM COATED ORAL at 18:34

## 2021-09-02 RX ADMIN — DEXAMETHASONE SODIUM PHOSPHATE 8 MG: 4 INJECTION, SOLUTION INTRA-ARTICULAR; INTRALESIONAL; INTRAMUSCULAR; INTRAVENOUS; SOFT TISSUE at 03:17

## 2021-09-02 RX ADMIN — DEXAMETHASONE SODIUM PHOSPHATE 8 MG: 4 INJECTION, SOLUTION INTRA-ARTICULAR; INTRALESIONAL; INTRAMUSCULAR; INTRAVENOUS; SOFT TISSUE at 15:49

## 2021-09-02 RX ADMIN — ESCITALOPRAM OXALATE 10 MG: 10 TABLET ORAL at 15:49

## 2021-09-02 RX ADMIN — Medication 1 PATCH: at 13:38

## 2021-09-02 RX ADMIN — SODIUM CHLORIDE 500 ML: 9 INJECTION, SOLUTION INTRAVENOUS at 03:23

## 2021-09-02 RX ADMIN — ALBUTEROL SULFATE 2.5 MG: 2.5 SOLUTION RESPIRATORY (INHALATION) at 16:03

## 2021-09-02 RX ADMIN — ALBUTEROL SULFATE 2.5 MG: 2.5 SOLUTION RESPIRATORY (INHALATION) at 19:54

## 2021-09-02 RX ADMIN — IOPAMIDOL 150 ML: 755 INJECTION, SOLUTION INTRAVENOUS at 04:09

## 2021-09-02 RX ADMIN — LOSARTAN POTASSIUM 100 MG: 50 TABLET, FILM COATED ORAL at 13:37

## 2021-09-02 NOTE — H&P
[facility] Medicine Services  HISTORY AND PHYSICAL    Patient Name: Starr Ruiz  : 1980  MRN: [id number]  Primary Care Physician: Nahed Molina MD  Date of admission: 2021      Subjective   Subjective     Chief Complaint:  Shortness of breath    HPI:  Starr Ruiz is a 41 y.o. female with past medical history of hypertension, tobacco abuse, anxiety and obesity presents with shortness of breath.  Patient says her symptoms began 1 week ago and have progressively worsened over the past couple of days.  She states her lungs feel tight and there is slight wheezing.  She has a cough that is nonproductive.  She denies fever or chills, change in taste or smell.  She does have a headache that she attributes to her cough, and she says that coughing occasionally makes her throw up.  Occasional loose stool.  She is also experiencing myalgias and low back pain.    Patient says she has not been vaccinated for COVID-19, but was exposed to the virus recently at work by a coworker who tested positive (along with his whole family) approximately 10 days ago.         COVID Details:    Symptoms:    [] NONE [] Fever [x]  Cough [x] Shortness of breath [] Change in taste/smell      The patient qualifies to receive the vaccine, but they have not yet received it.      Review of Systems   Constitutional: Negative.    HENT: Negative.    Eyes: Negative.    Respiratory: Positive for cough, chest tightness, shortness of breath and wheezing.    Cardiovascular: Negative.    Gastrointestinal: Positive for diarrhea and vomiting.   Endocrine: Negative.    Genitourinary: Negative.    Musculoskeletal: Positive for back pain and myalgias.   Skin: Negative.    Allergic/Immunologic: Negative.    Neurological: Negative.    Hematological: Negative.    Psychiatric/Behavioral: Negative.         All other systems reviewed and are negative.     Personal History     Past Medical History:    Diagnosis Date   • Anxiety    • Depression    • Hypertension        Past Surgical History:   Procedure Laterality Date   • D & C HYSTEROSCOPY WITH NOVASURE ENDOMETRIAL ABLATION AND MYOSURE     • LEEP     • TOOTH EXTRACTION     • TUBAL ABDOMINAL LIGATION         Family History:family history includes Breast cancer in an other family member; No Known Problems in her father and mother. Otherwise pertinent FHx was reviewed and unremarkable.     Social History:  reports that she has been smoking. She has been smoking about 1.00 pack per day. She has never used smokeless tobacco. She reports current alcohol use. She reports that she does not use drugs.  Social History     Social History Narrative    Pt is single. Pt has 4 children. Pt is not currently employed.       Medications:  Available home medication information reviewed.  (Not in a hospital admission)      Allergies   Allergen Reactions   • Lisinopril        Objective   Objective     Vital Signs:   Temp:  [98.3 °F (36.8 °C)-98.7 °F (37.1 °C)] 98.7 °F (37.1 °C)  Heart Rate:  [82-97] 82  Resp:  [20] 20  BP: (143-184)/(84-93) 143/84  Flow (L/min):  [2] 2       Physical Exam   Constitutional - no acute distress, nontoxic, in bed  HEENT-NCAT, mucous membranes moist  CV-RRR, S1 S2 normal, no m/r/g  Resp-diminished bilaterally with rare scattered expiratory wheezes  Abd-soft, nontender, nondistended, normoactive bowel sounds, obese  Ext-No lower extremity cyanosis, clubbing or edema bilaterally  Neuro-alert and oriented, speech clear, moves all extremities   Psych-normal affect   Skin- No rash on exposed UE or LE bilaterally      Result Review:  I have personally reviewed the results from the time of this admission to 9/2/2021 09:22 EDT and agree with these findings:  []  Laboratory  []  Microbiology  []  Radiology  []  EKG/Telemetry   []  Cardiology/Vascular   []  Pathology  []  Old records  []  Other:  Most notable findings include: CT chest with no PE or  infiltrate      LAB RESULTS:      Lab 09/02/21  0436 09/02/21  0314 09/01/21 2233   WBC  --   --  10.13   HEMOGLOBIN  --   --  13.1   HEMATOCRIT  --   --  40.4   PLATELETS  --   --  265   NEUTROS ABS  --   --  6.67   IMMATURE GRANS (ABS)  --   --  0.08*   LYMPHS ABS  --   --  2.22   MONOS ABS  --   --  0.53   EOS ABS  --   --  0.54*   MCV  --   --  96.7   CRP  --   --  1.55*   PROCALCITONIN  --   --  0.05   LACTATE 1.1  --  2.1*   LDH  --   --  183   D DIMER QUANT  --  0.31  --          Lab 09/01/21 2233   SODIUM 135*   POTASSIUM 4.1   CHLORIDE 101   CO2 23.0   ANION GAP 11.0   BUN 10   CREATININE 0.85   GLUCOSE 134*   CALCIUM 9.3         Lab 09/01/21 2233   TOTAL PROTEIN 7.1   ALBUMIN 4.00   GLOBULIN 3.1   ALT (SGPT) 11   AST (SGOT) 11   BILIRUBIN <0.2   ALK PHOS 83         Lab 09/01/21 2233   PROBNP 36.3   TROPONIN T <0.010                 Lab 09/02/21  0334   PH, ARTERIAL 7.398   PCO2, ARTERIAL 39.0   PO2 ART 65.1*   FIO2 28   HCO3 ART 24.0   BASE EXCESS ART -0.6*   CARBOXYHEMOGLOBIN 3.0*     UA    Urinalysis 5/10/21   Ketones, UA Negative   Leukocytes, UA Negative             Microbiology Results (last 10 days)     Procedure Component Value - Date/Time    COVID PRE-OP / PRE-PROCEDURE SCREENING ORDER (NO ISOLATION) - Swab, Nasopharynx [[id number]]  (Normal) Collected: 09/02/21 0314    Lab Status: Final result Specimen: Swab from Nasopharynx Updated: 09/02/21 0416    Narrative:      The following orders were created for panel order COVID PRE-OP / PRE-PROCEDURE SCREENING ORDER (NO ISOLATION) - Swab, Nasopharynx.  Procedure                               Abnormality         Status                     ---------                               -----------         ------                     COVID-19 and FLU A/B PCR...[[id number]]  Normal              Final result                 Please view results for these tests on the individual orders.    COVID-19 and FLU A/B PCR - Swab, Nasopharynx [[id number]]  (Normal) Collected:  09/02/21 0314    Lab Status: Final result Specimen: Swab from Nasopharynx Updated: 09/02/21 0416     COVID19 Not Detected     Influenza A PCR Not Detected     Influenza B PCR Not Detected    Narrative:      Fact sheet for providers: https://www.fda.gov/media/074949/download    Fact sheet for patients: https://www.fda.gov/media/429398/download    Test performed by PCR.    COVID PRE-OP / PRE-PROCEDURE SCREENING ORDER (NO ISOLATION) - Swab, Nasopharynx [[id number]]  (Normal) Collected: 09/02/21 0300    Lab Status: Final result Specimen: Swab from Nasopharynx Updated: 09/02/21 0556    Narrative:      The following orders were created for panel order COVID PRE-OP / PRE-PROCEDURE SCREENING ORDER (NO ISOLATION) - Swab, Nasopharynx.  Procedure                               Abnormality         Status                     ---------                               -----------         ------                     Respiratory Panel PCR w/...[[id number]]  Normal              Final result                 Please view results for these tests on the individual orders.    Respiratory Panel PCR w/COVID-19(SARS-CoV-2) KARIN/HUI/VINOD/PAD/COR/MAD/AMANDA In-House, NP Swab in UTM/VTM, 3-4 HR TAT - Swab, Nasopharynx [[id number]]  (Normal) Collected: 09/02/21 0300    Lab Status: Final result Specimen: Swab from Nasopharynx Updated: 09/02/21 0556     ADENOVIRUS, PCR Not Detected     Coronavirus 229E Not Detected     Coronavirus HKU1 Not Detected     Coronavirus NL63 Not Detected     Coronavirus OC43 Not Detected     COVID19 Not Detected     Human Metapneumovirus Not Detected     Human Rhinovirus/Enterovirus Not Detected     Influenza A PCR Not Detected     Influenza B PCR Not Detected     Parainfluenza Virus 1 Not Detected     Parainfluenza Virus 2 Not Detected     Parainfluenza Virus 3 Not Detected     Parainfluenza Virus 4 Not Detected     RSV, PCR Not Detected     Bordetella pertussis pcr Not Detected     Bordetella parapertussis PCR Not Detected      Chlamydophila pneumoniae PCR Not Detected     Mycoplasma pneumo by PCR Not Detected    Narrative:      In the setting of a positive respiratory panel with a viral infection PLUS a negative procalcitonin without other underlying concern for bacterial infection, consider observing off antibiotics or discontinuation of antibiotics and continue supportive care. If the respiratory panel is positive for atypical bacterial infection (Bordetella pertussis, Chlamydophila pneumoniae, or Mycoplasma pneumoniae), consider antibiotic de-escalation to target atypical bacterial infection.          XR Chest 1 View    Result Date: 9/2/2021  CR Chest 1 Vw INDICATION: Short of air. Tobacco abuse and hypertension. COMPARISON:  None available. FINDINGS: Single portable AP view(s) of the chest.  Portions of both lung apices excluded from view. Cardiac silhouette is within normal limits. The vascularity is unremarkable and the lungs are clear. No pneumothorax.     Impression: 1. Negative chest. Signer Name: Dex Harmon MD  Signed: 9/2/2021 1:19 AM  Workstation Name: [email or web address]  [facility]    CT Angiogram Chest    Result Date: 9/2/2021  CTA Chest INDICATION: Chest pain short of air. Flulike symptoms and hypoxia. COVID- 19 suspected. TECHNIQUE: CT angiogram of the chest with IV contrast. 3-D reconstructions were obtained and reviewed.   Radiation dose reduction techniques included automated exposure control or exposure modulation based on body size. Count of known CT and cardiac nuc med studies performed in previous 12 months: 0. COMPARISON: None available. FINDINGS: Study degraded by poor bolus timing and motion degradation. Images were repeated. Best study possible. The lungs are clear. There is no effusion. No pneumothorax. There is no pericardial effusion. There is no threshold adenopathy. The included abdomen demonstrates splenomegaly and probable hepatic steatosis. The aorta maintains normal caliber. There  is no dissection. Suboptimal bolus timing for evaluation of the pulmonary arteries. Within study limitations, no large filling defect in the main left or right central pulmonary arteries. The more distal pulmonary arteries are suboptimally opacified or assessed there is no suspicious bone lesion.  negative.     Impression: 1. Motion degradation and suboptimal bolus timing. 2. No aortic aneurysm or dissection. 3. No large filling defect in the main left or right central pulmonary arteries. 4. Splenomegaly and hepatic steatosis. Signer Name: Dex Harmon MD  Signed: 9/2/2021 4:24 AM  Workstation Name: [email or web address]  [facility]          Assessment/Plan   Assessment & Plan     Active Hospital Problems    Diagnosis  POA   • Viral respiratory infection [J98.8, B97.89]  Yes       41-year-old unvaccinated smoker presents with 1 week of shortness of breath and cough after recent exposure to COVID-19 by coworker.    Shortness of breath  Bronchospasm  Tobacco abuse  Hypoxia  -CT angiogram of the chest negative for pulmonary embolism or infiltrate  -Initial COVID-19 testing negative for the novel coronavirus or influenza  -We will repeat COVID-19 testing to include the full respiratory panel PCR  -Continue Decadron  -Albuterol MDI scheduled  -Nicotine replacement, counseled smoking cessation    Obesity  -BMI 55, body habitus may also contribute to shortness of breath    Hypertension    Hepatic Steatosis and splenomegaly  - discussed with patient, including lifestyle and dietary changes, as well as the risk this can progress to more serious liver disease including cirrhosis. Follow up with PCP with consideration of referral to Gastroenterology, particularly since the patient now has splenomegaly. Also consider outpatient liver ultrasound with elastography.    DVT prophylaxis: Lovenox      CODE STATUS: Full  There are no questions and answers to display.       Admission Status:  I believe this  patient meets OBSERVATION status, however if further evaluation or treatment plans warrant, status may change.  Based upon current information, I predict patient's care encounter to be less than or equal to 2 midnights.      Weston Roy MD  09/02/21

## 2021-09-02 NOTE — CASE MANAGEMENT/SOCIAL WORK
Discharge Planning Assessment  [facility]     Patient Name: Starr Ruiz  MRN: [id number]  Today's Date: 9/2/2021    Admit Date: 9/2/2021    Discharge Needs Assessment     Row Name 09/02/21 1012       Living Environment    Lives With child(sunni), dependent    Name(s) of Who Lives With Patient Lives with two children in [facility]    Current Living Arrangements home/apartment/condo    Primary Care Provided by self    Provides Primary Care For child(sunni)    Caregiving Concerns Reports independent with ADL's    Family Caregiver if Needed None-able to provide self care and care for her children.    Quality of Family Relationships involved    Able to Return to Prior Arrangements yes    Living Arrangement Comments Resides in private residence with two children       Resource/Environmental Concerns    Resource/Environmental Concerns none    Transportation Concerns car, none       Transition Planning    Patient/Family Anticipates Transition to home    Patient/Family Anticipated Services at Transition     Transportation Anticipated family or friend will provide       Discharge Needs Assessment    Readmission Within the Last 30 Days no previous admission in last 30 days    Equipment Currently Used at Home none    Concerns to be Addressed no discharge needs identified;denies needs/concerns at this time    Concerns Comments No needs in ED, Case Management following    Anticipated Changes Related to Illness none    Discharge Coordination/Progress Anticipate patient will return home when medically stable.        Discharge Plan     Row Name 09/02/21 1017       Plan    Plan Home    Patient/Family in Agreement with Plan -yes Patient    Plan Comments Planning hospital admission, met with patient at bedside.  Reports independent with ADL's, no DME use or home health involvement.  Lives in [place] with children.  Made aware Case Management will follow for all needs, appreciative.    Final  Discharge Disposition Code 01 - home or self-care        Continued Care and Services - Admitted Since 9/2/2021    Coordination has not been started for this encounter.         Demographic Summary     Row Name 09/02/21 1012       General Information    Arrived From  emergency department;home    Referral Source  admission list;emergency department    Reason for Consult  discharge planning    Preferred Language  English     Used During This Interaction  no        Functional Status    No documentation.       Psychosocial    No documentation.       Abuse/Neglect    No documentation.       Legal    No documentation.       Substance Abuse    No documentation.       Patient Forms    No documentation.           SANDRA Dowling

## 2021-09-02 NOTE — ED PROVIDER NOTES
Subjective   This is a 41-year-old female that presents to the ER with 3-day history of flulike symptoms.  Patient reports mainly nonproductive cough and at times sputum production of clear to yellow sputum.  She also reports headache, generalized body aches, diarrhea, malaise/fatigue, and chest tightness with shortness of breath.  Patient has morbid obesity with BMI of 55.  She denies having a previous sleep study or known history of sleep apnea.  She reports history of asthma and has used inhalers in the past.  She smokes 1 pack of cigarettes per day.  She denies any known history of COPD.  She denies any loss of taste or smell.  She denies any lower extremity pedal edema.  She denies any dysuria, urgency, or frequency months menstrual period: s/p ablation.  Patient has not been vaccinated for COVID-19.  She says that her boss at work tested positive for COVID-19 last week.  She was seen at [facility] ER 2 days ago and tested negative for COVID-19.  Patient started feeling worse so she came to our ER for evaluation.  Past medical history is significant for morbid obesity, tobacco dependence, anxiety/depression, and asthma.      History provided by:  Patient  Flu Symptoms  Presenting symptoms: cough (cough is yellow.), diarrhea (loose stools x 2 today.), fatigue, headache, myalgias and shortness of breath    Presenting symptoms: no fever, no nausea, no rhinorrhea, no sore throat and no vomiting    Duration:  3 hours  Chronicity:  New  Relieved by:  Nothing  Worsened by:  Nothing  Ineffective treatments:  OTC medications (Dayquil, Mucinex, Ibuprofen)  Associated symptoms: chills, decreased appetite, decreased physical activity and nasal congestion    Associated symptoms: no ear pain    Risk factors: sick contacts (Pt's boss at work dx with Covid-19 last week.)    Risk factors comment:  Risk factors are HTN, obesity with BMI 55. History of asthma. Smokes 1ppd.      Review of Systems   Constitutional: Positive for  activity change, chills, decreased appetite and fatigue. Negative for fever.   HENT: Positive for congestion. Negative for ear pain, postnasal drip, rhinorrhea, sinus pressure, sinus pain and sore throat.         No loss of taste or smell.  Non-vaccinated for Covid-19.   Respiratory: Positive for cough (cough is yellow.), chest tightness and shortness of breath.         Smokes 1ppd     Cardiovascular: Positive for chest pain (chest tightness). Negative for palpitations and leg swelling.   Gastrointestinal: Positive for diarrhea (loose stools x 2 today.). Negative for abdominal pain, nausea and vomiting.   Genitourinary: Positive for frequency. Negative for dysuria, flank pain and urgency.        LMP: s/p ablation.   Musculoskeletal: Positive for myalgias.   Neurological: Positive for headaches. Negative for dizziness.   All other systems reviewed and are negative.      Past Medical History:   Diagnosis Date   • Anxiety    • Depression    • Hypertension        Allergies   Allergen Reactions   • Lisinopril        Past Surgical History:   Procedure Laterality Date   • D & C HYSTEROSCOPY WITH NOVASURE ENDOMETRIAL ABLATION AND MYOSURE     • LEEP     • TOOTH EXTRACTION     • TUBAL ABDOMINAL LIGATION         Family History   Problem Relation Age of Onset   • No Known Problems Mother    • No Known Problems Father    • Breast cancer Other         age unknown    • Endometrial cancer Neg Hx    • Ovarian cancer Neg Hx        Social History     Socioeconomic History   • Marital status: Single     Spouse name: Not on file   • Number of children: Not on file   • Years of education: Not on file   • Highest education level: Not on file   Tobacco Use   • Smoking status: Current Every Day Smoker     Packs/day: 1.00   • Smokeless tobacco: Never Used   Substance and Sexual Activity   • Alcohol use: Yes     Comment: rare   • Drug use: No   • Sexual activity: Yes     Partners: Male     Birth control/protection: None           Objective    Physical Exam  Vitals and nursing note reviewed.   Constitutional:       Appearance: Normal appearance.      Comments: Morbid obesity with BMI 55.   HENT:      Head: Normocephalic and atraumatic.      Right Ear: Tympanic membrane normal.      Left Ear: Tympanic membrane normal.      Nose: Congestion present. No rhinorrhea.      Right Sinus: No maxillary sinus tenderness or frontal sinus tenderness.      Left Sinus: No maxillary sinus tenderness or frontal sinus tenderness.      Comments: Positive nasal congestion.  No frontal or maxillary sinus tenderness.     Mouth/Throat:      Mouth: Mucous membranes are moist.      Pharynx: Oropharynx is clear. No oropharyngeal exudate or posterior oropharyngeal erythema.      Comments: Oral mucous membranes are moist.  Posterior pharynx is nonerythematous.  No exudate or vesicles.  Eyes:      Extraocular Movements: Extraocular movements intact.      Conjunctiva/sclera: Conjunctivae normal.      Pupils: Pupils are equal, round, and reactive to light.   Cardiovascular:      Rate and Rhythm: Normal rate and regular rhythm.  No extrasystoles are present.     Pulses: Normal pulses.      Heart sounds: Normal heart sounds.      Comments: Regular rate and rhythm.  No ectopy.  No pedal edema to lower extremities.  Pulmonary:      Effort: Pulmonary effort is normal. Tachypnea present. No accessory muscle usage.      Breath sounds: No decreased air movement. Examination of the right-upper field reveals wheezing. Examination of the left-upper field reveals wheezing. Examination of the right-lower field reveals wheezing. Examination of the left-lower field reveals wheezing. Wheezing present. No decreased breath sounds or rhonchi.      Comments: Tachypnea with conversation.  O2 in place per nasal cannula.  Bilateral expiratory wheezes appreciated.  No rhonchi or rales.  No respiratory distress.  Abdominal:      General: Bowel sounds are normal. There is no distension.      Palpations:  Abdomen is soft.      Tenderness: There is no abdominal tenderness. There is no right CVA tenderness, left CVA tenderness, guarding or rebound.      Comments: Central obesity.  Soft and nontender. No flank or CVA TTP.   Musculoskeletal:         General: Normal range of motion.      Cervical back: Normal range of motion and neck supple.      Right lower leg: No edema.      Left lower leg: No edema.   Skin:     General: Skin is warm and dry.   Neurological:      General: No focal deficit present.      Mental Status: She is alert.         Procedures           ED Course  ED Course as of Sep 02 0534   Thu Sep 02, 2021   0526 EKG shows normal sinus rhythm.  No acute ST-T wave changes consistent with ischemia.  CBC and chemistries were essentially normal.  ABG revealed pH 7.398, PCO2 39, PO2 65, bicarb 24.  Lactic acid is 2.1.  CRP is 1.55.  BNP is 36.  D-dimer is 0.31.  COVID-19 and influenza testing are negative.  CT angiogram of the chest with contrast reveals motion degradation and suboptimal bolus timing.  No aortic aneurysm or dissection.  No evidence of PE.  No evidence of pneumonia or other acute infectious process.  O2 was applied due to hypoxia with O2 sat around 88 to 89%.  O2 sat is around 90% on 2 L.  Patient has no known history of sleep apnea but has never had a sleep study.  Question whether she has some underlying COPD with sleep apnea.  We will add a respiratory PCR panel to the previous Covid testing for further assessment.  Discussed admission with Dr. Martino, hospitalist.  She is agreeable to admission on telemetry.  We ordered Decadron 8 mg IV.  Patient is agreeable to admission.    [FC]      ED Course User Index  [FC] Racheal Santana PA-C           Recent Results (from the past 24 hour(s))   Comprehensive Metabolic Panel    Collection Time: 09/01/21 10:33 PM    Specimen: Blood   Result Value Ref Range    Glucose 134 (H) 65 - 99 mg/dL    BUN 10 6 - 20 mg/dL    Creatinine 0.85 0.57 - 1.00 mg/dL    Sodium  135 (L) 136 - 145 mmol/L    Potassium 4.1 3.5 - 5.2 mmol/L    Chloride 101 98 - 107 mmol/L    CO2 23.0 22.0 - 29.0 mmol/L    Calcium 9.3 8.6 - 10.5 mg/dL    Total Protein 7.1 6.0 - 8.5 g/dL    Albumin 4.00 3.50 - 5.20 g/dL    ALT (SGPT) 11 1 - 33 U/L    AST (SGOT) 11 1 - 32 U/L    Alkaline Phosphatase 83 39 - 117 U/L    Total Bilirubin <0.2 0.0 - 1.2 mg/dL    eGFR Non African Amer 74 >60 mL/min/1.73    Globulin 3.1 gm/dL    A/G Ratio 1.3 g/dL    BUN/Creatinine Ratio 11.8 7.0 - 25.0    Anion Gap 11.0 5.0 - 15.0 mmol/L   BNP    Collection Time: 09/01/21 10:33 PM    Specimen: Blood   Result Value Ref Range    proBNP 36.3 0.0 - 450.0 pg/mL   Troponin    Collection Time: 09/01/21 10:33 PM    Specimen: Blood   Result Value Ref Range    Troponin T <0.010 0.000 - 0.030 ng/mL   Green Top (Gel)    Collection Time: 09/01/21 10:33 PM   Result Value Ref Range    Extra Tube Hold for add-ons.    Lavender Top    Collection Time: 09/01/21 10:33 PM   Result Value Ref Range    Extra Tube hold for add-on    Gold Top - SST    Collection Time: 09/01/21 10:33 PM   Result Value Ref Range    Extra Tube Hold for add-ons.    Gray Top    Collection Time: 09/01/21 10:33 PM   Result Value Ref Range    Extra Tube Hold for add-ons.    Light Blue Top    Collection Time: 09/01/21 10:33 PM   Result Value Ref Range    Extra Tube hold for add-on    CBC Auto Differential    Collection Time: 09/01/21 10:33 PM    Specimen: Blood   Result Value Ref Range    WBC 10.13 3.40 - 10.80 10*3/mm3    RBC 4.18 3.77 - 5.28 10*6/mm3    Hemoglobin 13.1 12.0 - 15.9 g/dL    Hematocrit 40.4 34.0 - 46.6 %    MCV 96.7 79.0 - 97.0 fL    MCH 31.3 26.6 - 33.0 pg    MCHC 32.4 31.5 - 35.7 g/dL    RDW 14.1 12.3 - 15.4 %    RDW-SD 49.6 37.0 - 54.0 fl    MPV 8.6 6.0 - 12.0 fL    Platelets 265 140 - 450 10*3/mm3    Neutrophil % 65.9 42.7 - 76.0 %    Lymphocyte % 21.9 19.6 - 45.3 %    Monocyte % 5.2 5.0 - 12.0 %    Eosinophil % 5.3 0.3 - 6.2 %    Basophil % 0.9 0.0 - 1.5 %     Immature Grans % 0.8 (H) 0.0 - 0.5 %    Neutrophils, Absolute 6.67 1.70 - 7.00 10*3/mm3    Lymphocytes, Absolute 2.22 0.70 - 3.10 10*3/mm3    Monocytes, Absolute 0.53 0.10 - 0.90 10*3/mm3    Eosinophils, Absolute 0.54 (H) 0.00 - 0.40 10*3/mm3    Basophils, Absolute 0.09 0.00 - 0.20 10*3/mm3    Immature Grans, Absolute 0.08 (H) 0.00 - 0.05 10*3/mm3    nRBC 0.0 0.0 - 0.2 /100 WBC   Lactate Dehydrogenase    Collection Time: 09/01/21 10:33 PM    Specimen: Blood   Result Value Ref Range     135 - 214 U/L   Procalcitonin    Collection Time: 09/01/21 10:33 PM    Specimen: Blood   Result Value Ref Range    Procalcitonin 0.05 0.00 - 0.25 ng/mL   C-reactive Protein    Collection Time: 09/01/21 10:33 PM    Specimen: Blood   Result Value Ref Range    C-Reactive Protein 1.55 (H) 0.00 - 0.50 mg/dL   Lactic Acid, Plasma    Collection Time: 09/01/21 10:33 PM    Specimen: Blood   Result Value Ref Range    Lactate 2.1 (C) 0.5 - 2.0 mmol/L   D-dimer, Quantitative    Collection Time: 09/02/21  3:14 AM    Specimen: Blood   Result Value Ref Range    D-Dimer, Quantitative 0.31 0.00 - 0.56 MCGFEU/mL   COVID-19 and FLU A/B PCR - Swab, Nasopharynx    Collection Time: 09/02/21  3:14 AM    Specimen: Nasopharynx; Swab   Result Value Ref Range    COVID19 Not Detected Not Detected - Ref. Range    Influenza A PCR Not Detected Not Detected    Influenza B PCR Not Detected Not Detected   POCT pregnancy, urine    Collection Time: 09/02/21  3:21 AM    Specimen: Urine   Result Value Ref Range    HCG, Urine, QL Negative Negative    Lot Number [id number]     Internal Positive Control Positive Positive, Passed    Internal Negative Control Negative Negative, Passed   Blood Gas, Arterial With Co-Ox    Collection Time: 09/02/21  3:34 AM    Specimen: Arterial Blood   Result Value Ref Range    Site Left Radial     Rudy's Test N/A     pH, Arterial 7.398 7.350 - 7.450 pH units    pCO2, Arterial 39.0 35.0 - 45.0 mm Hg    pO2, Arterial 65.1 (L) 83.0 - 108.0  mm Hg    HCO3, Arterial 24.0 20.0 - 26.0 mmol/L    Base Excess, Arterial -0.6 (L) 0.0 - 2.0 mmol/L    Hemoglobin, Blood Gas 13.6 (L) 14 - 18 g/dL    Hematocrit, Blood Gas 41.8 %    Oxyhemoglobin 90.4 (L) 94 - 99 %    Methemoglobin 0.40 0.00 - 1.50 %    Carboxyhemoglobin 3.0 (H) 0 - 2 %    CO2 Content 25.2 22 - 33 mmol/L    Temperature 37.0 C    Barometric Pressure for Blood Gas      Modality Nasal Cannula     FIO2 28 %    Rate 0 Breaths/minute    PIP 0 cmH2O    IPAP 0     EPAP 0     Note      pH, Temp Corrected 7.398 pH Units    pCO2, Temperature Corrected 39.0 35 - 45 mm Hg    pO2, Temperature Corrected 65.1 (L) 83 - 108 mm Hg   STAT Lactic Acid, Reflex    Collection Time: 09/02/21  4:36 AM    Specimen: Blood   Result Value Ref Range    Lactate 1.1 0.5 - 2.0 mmol/L     Note: In addition to lab results from this visit, the labs listed above may include labs taken at another facility or during a different encounter within the last 24 hours. Please correlate lab times with ED admission and discharge times for further clarification of the services performed during this visit.    CT Angiogram Chest   Final Result      1. Motion degradation and suboptimal bolus timing.   2. No aortic aneurysm or dissection.   3. No large filling defect in the main left or right central pulmonary arteries.   4. Splenomegaly and hepatic steatosis.      Signer Name: Dex Harmon MD    Signed: 9/2/2021 4:24 AM    Workstation Name: [facility]     [facility]      XR Chest 1 View   Final Result   1. Negative chest.      Signer Name: Dex Harmon MD    Signed: 9/2/2021 1:19 AM    Workstation Name: [facility]     [facility]        Vitals:    09/02/21 0245 09/02/21 0246 09/02/21 0247 09/02/21 0248   BP:       BP Location:       Patient Position:       Pulse:       Resp:       Temp:       SpO2: 91% (!) 67% (!) 89% 90%   Weight:       Height:         Medications   sodium chloride 0.9 % flush 10  mL (has no administration in time range)   sodium chloride 0.9 % bolus 500 mL (500 mL Intravenous New Bag 9/2/21 0323)   dexamethasone (DECADRON) injection 8 mg (8 mg Intravenous Given 9/2/21 0317)   iopamidol (ISOVUE-370) 76 % injection 150 mL (150 mL Intravenous Given 9/2/21 0409)     ECG/EMG Results (last 24 hours)     Procedure Component Value Units Date/Time    ECG 12 Lead [[id number]] Collected: 09/01/21 2236     Updated: 09/01/21 2237        ECG 12 Lead             5/no                                MDM    Final diagnoses:   Viral respiratory infection   Chest tightness   Shortness of breath   Hypoxia   Morbid obesity (CMS/HCC)   Tobacco dependence   History of asthma   Anxiety and depression   Encntr for obs for susp expsr to oth biolg agents ruled out       ED Disposition  ED Disposition     ED Disposition Condition Comment    Decision to Admit            No follow-up provider specified.       Medication List      No changes were made to your prescriptions during this visit.          Racheal Santana PA-C  09/02/21 0361

## 2021-09-03 LAB
ANION GAP SERPL CALCULATED.3IONS-SCNC: 14 MMOL/L (ref 5–15)
BUN SERPL-MCNC: 12 MG/DL (ref 6–20)
BUN/CREAT SERPL: 16.2 (ref 7–25)
CALCIUM SPEC-SCNC: 9.3 MG/DL (ref 8.6–10.5)
CHLORIDE SERPL-SCNC: 102 MMOL/L (ref 98–107)
CO2 SERPL-SCNC: 21 MMOL/L (ref 22–29)
CREAT SERPL-MCNC: 0.74 MG/DL (ref 0.57–1)
DEPRECATED RDW RBC AUTO: 49.3 FL (ref 37–54)
ERYTHROCYTE [DISTWIDTH] IN BLOOD BY AUTOMATED COUNT: 14 % (ref 12.3–15.4)
GFR SERPL CREATININE-BSD FRML MDRD: 86 ML/MIN/1.73
GLUCOSE SERPL-MCNC: 208 MG/DL (ref 65–99)
HBA1C MFR BLD: 5.2 % (ref 4.8–5.6)
HCT VFR BLD AUTO: 42.3 % (ref 34–46.6)
HGB BLD-MCNC: 13.7 G/DL (ref 12–15.9)
MCH RBC QN AUTO: 31.1 PG (ref 26.6–33)
MCHC RBC AUTO-ENTMCNC: 32.4 G/DL (ref 31.5–35.7)
MCV RBC AUTO: 95.9 FL (ref 79–97)
PLATELET # BLD AUTO: 309 10*3/MM3 (ref 140–450)
PMV BLD AUTO: 8.8 FL (ref 6–12)
POTASSIUM SERPL-SCNC: 4.4 MMOL/L (ref 3.5–5.2)
QT INTERVAL: 356 MS
QTC INTERVAL: 447 MS
RBC # BLD AUTO: 4.41 10*6/MM3 (ref 3.77–5.28)
SODIUM SERPL-SCNC: 137 MMOL/L (ref 136–145)
WBC # BLD AUTO: 14.66 10*3/MM3 (ref 3.4–10.8)

## 2021-09-03 PROCEDURE — 85027 COMPLETE CBC AUTOMATED: CPT | Performed by: INTERNAL MEDICINE

## 2021-09-03 PROCEDURE — 94799 UNLISTED PULMONARY SVC/PX: CPT

## 2021-09-03 PROCEDURE — 83036 HEMOGLOBIN GLYCOSYLATED A1C: CPT | Performed by: INTERNAL MEDICINE

## 2021-09-03 PROCEDURE — 80048 BASIC METABOLIC PNL TOTAL CA: CPT | Performed by: INTERNAL MEDICINE

## 2021-09-03 PROCEDURE — 99233 SBSQ HOSP IP/OBS HIGH 50: CPT | Performed by: INTERNAL MEDICINE

## 2021-09-03 PROCEDURE — 25010000002 DEXAMETHASONE PER 1 MG: Performed by: INTERNAL MEDICINE

## 2021-09-03 PROCEDURE — 25010000002 ENOXAPARIN PER 10 MG

## 2021-09-03 PROCEDURE — 25010000002 FUROSEMIDE PER 20 MG: Performed by: INTERNAL MEDICINE

## 2021-09-03 RX ORDER — FUROSEMIDE 10 MG/ML
60 INJECTION INTRAMUSCULAR; INTRAVENOUS ONCE
Status: COMPLETED | OUTPATIENT
Start: 2021-09-03 | End: 2021-09-03

## 2021-09-03 RX ORDER — CALCIUM CARBONATE 200(500)MG
2 TABLET,CHEWABLE ORAL 3 TIMES DAILY PRN
Status: DISCONTINUED | OUTPATIENT
Start: 2021-09-03 | End: 2021-09-05 | Stop reason: HOSPADM

## 2021-09-03 RX ORDER — GUAIFENESIN 600 MG/1
600 TABLET, EXTENDED RELEASE ORAL EVERY 12 HOURS SCHEDULED
Status: DISCONTINUED | OUTPATIENT
Start: 2021-09-03 | End: 2021-09-05 | Stop reason: HOSPADM

## 2021-09-03 RX ADMIN — SODIUM CHLORIDE, PRESERVATIVE FREE 10 ML: 5 INJECTION INTRAVENOUS at 08:51

## 2021-09-03 RX ADMIN — SODIUM CHLORIDE, PRESERVATIVE FREE 10 ML: 5 INJECTION INTRAVENOUS at 21:56

## 2021-09-03 RX ADMIN — HYDROCHLOROTHIAZIDE 12.5 MG: 12.5 CAPSULE ORAL at 08:51

## 2021-09-03 RX ADMIN — DEXAMETHASONE SODIUM PHOSPHATE 8 MG: 4 INJECTION, SOLUTION INTRA-ARTICULAR; INTRALESIONAL; INTRAMUSCULAR; INTRAVENOUS; SOFT TISSUE at 03:00

## 2021-09-03 RX ADMIN — GUAIFENESIN 600 MG: 600 TABLET, EXTENDED RELEASE ORAL at 11:41

## 2021-09-03 RX ADMIN — GUAIFENESIN 600 MG: 600 TABLET, EXTENDED RELEASE ORAL at 21:57

## 2021-09-03 RX ADMIN — ENOXAPARIN SODIUM 40 MG: 40 INJECTION SUBCUTANEOUS at 11:41

## 2021-09-03 RX ADMIN — ALBUTEROL SULFATE 2.5 MG: 2.5 SOLUTION RESPIRATORY (INHALATION) at 21:05

## 2021-09-03 RX ADMIN — Medication 1 PATCH: at 11:51

## 2021-09-03 RX ADMIN — ENOXAPARIN SODIUM 40 MG: 40 INJECTION SUBCUTANEOUS at 21:56

## 2021-09-03 RX ADMIN — ALBUTEROL SULFATE 2.5 MG: 2.5 SOLUTION RESPIRATORY (INHALATION) at 13:27

## 2021-09-03 RX ADMIN — FUROSEMIDE 60 MG: 10 INJECTION INTRAMUSCULAR; INTRAVENOUS at 11:41

## 2021-09-03 RX ADMIN — TRAZODONE HYDROCHLORIDE 150 MG: 100 TABLET ORAL at 21:56

## 2021-09-03 RX ADMIN — DEXAMETHASONE SODIUM PHOSPHATE 8 MG: 4 INJECTION, SOLUTION INTRA-ARTICULAR; INTRALESIONAL; INTRAMUSCULAR; INTRAVENOUS; SOFT TISSUE at 17:42

## 2021-09-03 RX ADMIN — ESCITALOPRAM OXALATE 10 MG: 10 TABLET ORAL at 08:51

## 2021-09-03 RX ADMIN — ANTACID TABLETS 2 TABLET: 500 TABLET, CHEWABLE ORAL at 18:11

## 2021-09-03 RX ADMIN — ALBUTEROL SULFATE 2.5 MG: 2.5 SOLUTION RESPIRATORY (INHALATION) at 15:46

## 2021-09-03 RX ADMIN — LOSARTAN POTASSIUM 100 MG: 50 TABLET, FILM COATED ORAL at 08:51

## 2021-09-03 RX ADMIN — ALBUTEROL SULFATE 2.5 MG: 2.5 SOLUTION RESPIRATORY (INHALATION) at 09:02

## 2021-09-03 NOTE — PROGRESS NOTES
[facility] Medicine Services  PROGRESS NOTE    Patient Name: Starr Ruiz  : 1980  MRN: [id number]    Date of Admission: 2021  Primary Care Physician: Nahed Molina MD    Subjective   Subjective     CC:  Hypoxia    HPI:  Still complaining of shortness of breath, especially with activity.  States that she was recently exposed to cold after she moved into a new house.  Was noted to be on some of the walls when she was trying to clean    ROS:  Gen- No fevers, chills  CV- No chest pain, palpitations  Resp- + cough, + dyspnea  GI- No N/V/D, abd pain         Objective   Objective     Vital Signs:   Temp:  [97.9 °F (36.6 °C)-98.5 °F (36.9 °C)] 98.5 °F (36.9 °C)  Heart Rate:  [] 66  Resp:  [18-22] 18  BP: (119-180)/() 131/91  Flow (L/min):  [2-3] 3     Physical Exam:  Constitutional: No acute distress, awake, alert  HENT: NCAT, mucous membranes moist  Respiratory: Decreased breath sounds secondary to body habitus, conversational dyspnea on 3 to 4 L  Cardiovascular: RRR, no murmurs, rubs, or gallops  Gastrointestinal: Positive bowel sounds, soft, nontender, morbidly obese  Musculoskeletal: Bilateral LE edema  Psychiatric: Appropriate affect, cooperative  Neurologic: Oriented x 3, speech clear  Skin: No rashes    Results Reviewed:  LAB RESULTS:      Lab 21  0436 21  0314 21  2233   WBC  --   --  10.13   HEMOGLOBIN  --   --  13.1   HEMATOCRIT  --   --  40.4   PLATELETS  --   --  265   NEUTROS ABS  --   --  6.67   IMMATURE GRANS (ABS)  --   --  0.08*   LYMPHS ABS  --   --  2.22   MONOS ABS  --   --  0.53   EOS ABS  --   --  0.54*   MCV  --   --  96.7   CRP  --   --  1.55*   PROCALCITONIN  --   --  0.05   LACTATE 1.1  --  2.1*   LDH  --   --  183   D DIMER QUANT  --  0.31  --          Lab 21  2233   SODIUM 135*   POTASSIUM 4.1   CHLORIDE 101   CO2 23.0   ANION GAP 11.0   BUN 10   CREATININE 0.85   GLUCOSE 134*   CALCIUM 9.3         Lab  09/01/21  2233   TOTAL PROTEIN 7.1   ALBUMIN 4.00   GLOBULIN 3.1   ALT (SGPT) 11   AST (SGOT) 11   BILIRUBIN <0.2   ALK PHOS 83         Lab 09/01/21  2233   PROBNP 36.3   TROPONIN T <0.010                 Lab 09/02/21  0334   PH, ARTERIAL 7.398   PCO2, ARTERIAL 39.0   PO2 ART 65.1*   FIO2 28   HCO3 ART 24.0   BASE EXCESS ART -0.6*   CARBOXYHEMOGLOBIN 3.0*     Brief Urine Lab Results  (Last result in the past 365 days)      Color   Clarity   Blood   Leuk Est   Nitrite   Protein   CREAT   Urine HCG        09/02/21 0321               Negative           Microbiology Results Abnormal     Procedure Component Value - Date/Time    COVID PRE-OP / PRE-PROCEDURE SCREENING ORDER (NO ISOLATION) - Swab, Nasopharynx [[id number]]  (Normal) Collected: 09/02/21 1028    Lab Status: Final result Specimen: Swab from Nasopharynx Updated: 09/02/21 1146    Narrative:      The following orders were created for panel order COVID PRE-OP / PRE-PROCEDURE SCREENING ORDER (NO ISOLATION) - Swab, Nasopharynx.  Procedure                               Abnormality         Status                     ---------                               -----------         ------                     Respiratory Panel PCR w/...[[id number]]  Normal              Final result                 Please view results for these tests on the individual orders.    Respiratory Panel PCR w/COVID-19(SARS-CoV-2) KARIN/HUI/VINOD/PAD/COR/MAD/AMANDA In-House, NP Swab in UTM/VTM, 3-4 HR TAT - Swab, Nasopharynx [[id number]]  (Normal) Collected: 09/02/21 1028    Lab Status: Final result Specimen: Swab from Nasopharynx Updated: 09/02/21 1146     ADENOVIRUS, PCR Not Detected     Coronavirus 229E Not Detected     Coronavirus HKU1 Not Detected     Coronavirus NL63 Not Detected     Coronavirus OC43 Not Detected     COVID19 Not Detected     Human Metapneumovirus Not Detected     Human Rhinovirus/Enterovirus Not Detected     Influenza A PCR Not Detected     Influenza B PCR Not Detected     Parainfluenza  Virus 1 Not Detected     Parainfluenza Virus 2 Not Detected     Parainfluenza Virus 3 Not Detected     Parainfluenza Virus 4 Not Detected     RSV, PCR Not Detected     Bordetella pertussis pcr Not Detected     Bordetella parapertussis PCR Not Detected     Chlamydophila pneumoniae PCR Not Detected     Mycoplasma pneumo by PCR Not Detected    Narrative:      In the setting of a positive respiratory panel with a viral infection PLUS a negative procalcitonin without other underlying concern for bacterial infection, consider observing off antibiotics or discontinuation of antibiotics and continue supportive care. If the respiratory panel is positive for atypical bacterial infection (Bordetella pertussis, Chlamydophila pneumoniae, or Mycoplasma pneumoniae), consider antibiotic de-escalation to target atypical bacterial infection.    COVID PRE-OP / PRE-PROCEDURE SCREENING ORDER (NO ISOLATION) - Swab, Nasopharynx [[id number]]  (Normal) Collected: 09/02/21 0300    Lab Status: Final result Specimen: Swab from Nasopharynx Updated: 09/02/21 0556    Narrative:      The following orders were created for panel order COVID PRE-OP / PRE-PROCEDURE SCREENING ORDER (NO ISOLATION) - Swab, Nasopharynx.  Procedure                               Abnormality         Status                     ---------                               -----------         ------                     Respiratory Panel PCR w/...[[id number]]  Normal              Final result                 Please view results for these tests on the individual orders.    Respiratory Panel PCR w/COVID-19(SARS-CoV-2) KARIN/HUI/VINOD/PAD/COR/MAD/AMANDA In-House, NP Swab in UTM/VTM, 3-4 HR TAT - Swab, Nasopharynx [[id number]]  (Normal) Collected: 09/02/21 0300    Lab Status: Final result Specimen: Swab from Nasopharynx Updated: 09/02/21 0556     ADENOVIRUS, PCR Not Detected     Coronavirus 229E Not Detected     Coronavirus HKU1 Not Detected     Coronavirus NL63 Not Detected     Coronavirus OC43  Not Detected     COVID19 Not Detected     Human Metapneumovirus Not Detected     Human Rhinovirus/Enterovirus Not Detected     Influenza A PCR Not Detected     Influenza B PCR Not Detected     Parainfluenza Virus 1 Not Detected     Parainfluenza Virus 2 Not Detected     Parainfluenza Virus 3 Not Detected     Parainfluenza Virus 4 Not Detected     RSV, PCR Not Detected     Bordetella pertussis pcr Not Detected     Bordetella parapertussis PCR Not Detected     Chlamydophila pneumoniae PCR Not Detected     Mycoplasma pneumo by PCR Not Detected    Narrative:      In the setting of a positive respiratory panel with a viral infection PLUS a negative procalcitonin without other underlying concern for bacterial infection, consider observing off antibiotics or discontinuation of antibiotics and continue supportive care. If the respiratory panel is positive for atypical bacterial infection (Bordetella pertussis, Chlamydophila pneumoniae, or Mycoplasma pneumoniae), consider antibiotic de-escalation to target atypical bacterial infection.    COVID PRE-OP / PRE-PROCEDURE SCREENING ORDER (NO ISOLATION) - Swab, Nasopharynx [[id number]]  (Normal) Collected: 09/02/21 0314    Lab Status: Final result Specimen: Swab from Nasopharynx Updated: 09/02/21 0416    Narrative:      The following orders were created for panel order COVID PRE-OP / PRE-PROCEDURE SCREENING ORDER (NO ISOLATION) - Swab, Nasopharynx.  Procedure                               Abnormality         Status                     ---------                               -----------         ------                     COVID-19 and FLU A/B PCR...[[id number]]  Normal              Final result                 Please view results for these tests on the individual orders.    COVID-19 and FLU A/B PCR - Swab, Nasopharynx [[id number]]  (Normal) Collected: 09/02/21 0314    Lab Status: Final result Specimen: Swab from Nasopharynx Updated: 09/02/21 0416     COVID19 Not Detected     Influenza  A PCR Not Detected     Influenza B PCR Not Detected    Narrative:      Fact sheet for providers: https://www.fda.gov/media/022480/download    Fact sheet for patients: https://www.fda.gov/media/578278/download    Test performed by PCR.          XR Chest 1 View    Result Date: 9/2/2021  CR Chest 1 Vw INDICATION: Short of air. Tobacco abuse and hypertension. COMPARISON:  None available. FINDINGS: Single portable AP view(s) of the chest.  Portions of both lung apices excluded from view. Cardiac silhouette is within normal limits. The vascularity is unremarkable and the lungs are clear. No pneumothorax.     Impression: 1. Negative chest. Signer Name: Dex Harmon MD  Signed: 9/2/2021 1:19 AM  Workstation Name: [email or web address]  [facility]    CT Angiogram Chest    Result Date: 9/2/2021  CTA Chest INDICATION: Chest pain short of air. Flulike symptoms and hypoxia. COVID- 19 suspected. TECHNIQUE: CT angiogram of the chest with IV contrast. 3-D reconstructions were obtained and reviewed.   Radiation dose reduction techniques included automated exposure control or exposure modulation based on body size. Count of known CT and cardiac nuc med studies performed in previous 12 months: 0. COMPARISON: None available. FINDINGS: Study degraded by poor bolus timing and motion degradation. Images were repeated. Best study possible. The lungs are clear. There is no effusion. No pneumothorax. There is no pericardial effusion. There is no threshold adenopathy. The included abdomen demonstrates splenomegaly and probable hepatic steatosis. The aorta maintains normal caliber. There is no dissection. Suboptimal bolus timing for evaluation of the pulmonary arteries. Within study limitations, no large filling defect in the main left or right central pulmonary arteries. The more distal pulmonary arteries are suboptimally opacified or assessed there is no suspicious bone lesion.  negative.     Impression: 1. Motion  degradation and suboptimal bolus timing. 2. No aortic aneurysm or dissection. 3. No large filling defect in the main left or right central pulmonary arteries. 4. Splenomegaly and hepatic steatosis. Signer Name: Dex Harmon MD  Signed: 9/2/2021 4:24 AM  Workstation Name: [email or web address]  [facility]          I have reviewed the medications:  Scheduled Meds:Albuterol Sulfate NEB Orderable, 2.5 mg, Nebulization, 4x Daily - RT  dexamethasone, 8 mg, Intravenous, Q12H  enoxaparin, 40 mg, Subcutaneous, Q12H  escitalopram, 10 mg, Oral, Daily  hydroCHLOROthiazide, 12.5 mg, Oral, Daily  losartan, 100 mg, Oral, Daily  nicotine, 1 patch, Transdermal, Q24H  sodium chloride, 10 mL, Intravenous, Q12H  traZODone, 150 mg, Oral, Nightly      Continuous Infusions:   PRN Meds:.•  acetaminophen **OR** acetaminophen **OR** acetaminophen  •  ondansetron **OR** ondansetron  •  sodium chloride  •  sodium chloride    Assessment/Plan   Assessment & Plan     Active Hospital Problems    Diagnosis  POA   • Viral respiratory infection [J98.8, B97.89]  Yes      Resolved Hospital Problems   No resolved problems to display.        Brief Hospital Course to date:  Starr Ruiz is a 41 y.o. female with history of obesity, tobacco abuse, unvaccinated for Covid who was admitted for 1 week shortness of breath and cough after recent exposure to COVID-19 at work.       Shortness of breath  Bronchospasm  Tobacco abuse  Hypoxia    -CT angiogram of the chest negative for pulmonary embolism or infiltrate  -Initial COVID-19 testing negative for the novel coronavirus or influenza  -Covid negative x2  -Check echo to eval for pulmonary hypertension  -Continue Decadron  -Albuterol MDI scheduled  -Nicotine replacement, counseled smoking cessation  -Add fungal culture     Obesity  -BMI 55, likely contributing to shortness of breath     Hypertension  -DC HCTZ  -Trial Lasix x1     Hepatic Steatosis and splenomegaly  -Secondary to  morbid obesity    DVT prophylaxis:  Medical DVT prophylaxis orders are present.       AM-PAC 6 Clicks Score (PT): 20 (09/02/21 9111)    Disposition: I expect the patient to be discharged pending improvement in respiratory status    CODE STATUS:   Code Status and Medical Interventions:   Ordered at: 09/02/21 0905     Level Of Support Discussed With:    Patient     Code Status:    CPR     Medical Interventions (Level of Support Prior to Arrest):    Full       Jenny Pelaez, DO  09/03/21

## 2021-09-03 NOTE — PAYOR COMM NOTE
"Fritz Wiggins (41 y.o. Female)     Date of Birth Social Security Number Address Home Phone MRN    1980  [address] [id number] [phone] [id number]    Ad[employer] Marital Status          Church Single       Admission Date Admission Type Admitting Provider Attending Provider Department, Room/Bed    21 Emergency Jenny Pelaez DO Carroll, Meghan C,  [facility] 3E, S340/1    Discharge Date Discharge Disposition Discharge Destination                       Attending Provider: Jenny Pelaez DO    Allergies: Lisinopril    Isolation: None   Infection: None   Code Status: CPR    Ht: 175.3 cm (69\")   Wt: 171 kg (376 lb)    Admission Cmt: None   Principal Problem: None                Active Insurance as of 2021     Primary Coverage     Payor Plan Insurance Group Employer/Plan Group    [place]      Payor Plan Address Payor Plan Phone Number Payor Plan Fax Number Effective Dates    [address] [address]   2017 - None En[place] [place] [address]       Subscriber Name Subscriber Birth Date Member ID       FRITZ WIGGINS 1980 [id number]                 Emergency Contacts      (Rel.) Home Phone Work Phone Mobile Phone    Vaishali Feliciano (Mother) [phone] -- --            Insurance Information                [place] Phone:     Subscriber: Fritz Wiggins Subscriber#: [id number]    Group#:  Precert#:              History & Physical      Weston Roy MD at 21 0922              [facility] Medicine Services  HISTORY AND PHYSICAL    Patient Name: Fritz Wiggins  : 1980  MRN: [id number]  Primary Care Physician: Nahed Molina MD  Date of admission: 2021      Subjective   Subjective     Chief Complaint:  Shortness of breath    HPI:  Fritz Wiggins is a 41 y.o. female with past " medical history of hypertension, tobacco abuse, anxiety and obesity presents with shortness of breath.  Patient says her symptoms began 1 week ago and have progressively worsened over the past couple of days.  She states her lungs feel tight and there is slight wheezing.  She has a cough that is nonproductive.  She denies fever or chills, change in taste or smell.  She does have a headache that she attributes to her cough, and she says that coughing occasionally makes her throw up.  Occasional loose stool.  She is also experiencing myalgias and low back pain.    Patient says she has not been vaccinated for COVID-19, but was exposed to the virus recently at work by a coworker who tested positive (along with his whole family) approximately 10 days ago.         COVID Details:    Symptoms:    [] NONE [] Fever [x]  Cough [x] Shortness of breath [] Change in taste/smell      The patient qualifies to receive the vaccine, but they have not yet received it.      Review of Systems   Constitutional: Negative.    HENT: Negative.    Eyes: Negative.    Respiratory: Positive for cough, chest tightness, shortness of breath and wheezing.    Cardiovascular: Negative.    Gastrointestinal: Positive for diarrhea and vomiting.   Endocrine: Negative.    Genitourinary: Negative.    Musculoskeletal: Positive for back pain and myalgias.   Skin: Negative.    Allergic/Immunologic: Negative.    Neurological: Negative.    Hematological: Negative.    Psychiatric/Behavioral: Negative.         All other systems reviewed and are negative.     Personal History     Past Medical History:   Diagnosis Date   • Anxiety    • Depression    • Hypertension        Past Surgical History:   Procedure Laterality Date   • D & C HYSTEROSCOPY WITH NOVASURE ENDOMETRIAL ABLATION AND MYOSURE     • LEEP     • TOOTH EXTRACTION     • TUBAL ABDOMINAL LIGATION         Family History:family history includes Breast cancer in an other family member; No Known Problems in her  father and mother. Otherwise pertinent FHx was reviewed and unremarkable.     Social History:  reports that she has been smoking. She has been smoking about 1.00 pack per day. She has never used smokeless tobacco. She reports current alcohol use. She reports that she does not use drugs.  Social History     Social History Narrative    Pt is single. Pt has 4 children. Pt is not currently employed.       Medications:  Available home medication information reviewed.  (Not in a hospital admission)      Allergies   Allergen Reactions   • Lisinopril        Objective   Objective     Vital Signs:   Temp:  [98.3 °F (36.8 °C)-98.7 °F (37.1 °C)] 98.7 °F (37.1 °C)  Heart Rate:  [82-97] 82  Resp:  [20] 20  BP: (143-184)/(84-93) 143/84  Flow (L/min):  [2] 2       Physical Exam   Constitutional - no acute distress, nontoxic, in bed  HEENT-NCAT, mucous membranes moist  CV-RRR, S1 S2 normal, no m/r/g  Resp-diminished bilaterally with rare scattered expiratory wheezes  Abd-soft, nontender, nondistended, normoactive bowel sounds, obese  Ext-No lower extremity cyanosis, clubbing or edema bilaterally  Neuro-alert and oriented, speech clear, moves all extremities   Psych-normal affect   Skin- No rash on exposed UE or LE bilaterally      Result Review:  I have personally reviewed the results from the time of this admission to 9/2/2021 09:22 EDT and agree with these findings:  []  Laboratory  []  Microbiology  []  Radiology  []  EKG/Telemetry   []  Cardiology/Vascular   []  Pathology  []  Old records  []  Other:  Most notable findings include: CT chest with no PE or infiltrate      LAB RESULTS:      Lab 09/02/21  0436 09/02/21  0314 09/01/21  2233   WBC  --   --  10.13   HEMOGLOBIN  --   --  13.1   HEMATOCRIT  --   --  40.4   PLATELETS  --   --  265   NEUTROS ABS  --   --  6.67   IMMATURE GRANS (ABS)  --   --  0.08*   LYMPHS ABS  --   --  2.22   MONOS ABS  --   --  0.53   EOS ABS  --   --  0.54*   MCV  --   --  96.7   CRP  --   --  1.55*    PROCALCITONIN  --   --  0.05   LACTATE 1.1  --  2.1*   LDH  --   --  183   D DIMER QUANT  --  0.31  --          Lab 09/01/21 2233   SODIUM 135*   POTASSIUM 4.1   CHLORIDE 101   CO2 23.0   ANION GAP 11.0   BUN 10   CREATININE 0.85   GLUCOSE 134*   CALCIUM 9.3         Lab 09/01/21 2233   TOTAL PROTEIN 7.1   ALBUMIN 4.00   GLOBULIN 3.1   ALT (SGPT) 11   AST (SGOT) 11   BILIRUBIN <0.2   ALK PHOS 83         Lab 09/01/21 2233   PROBNP 36.3   TROPONIN T <0.010                 Lab 09/02/21  0334   PH, ARTERIAL 7.398   PCO2, ARTERIAL 39.0   PO2 ART 65.1*   FIO2 28   HCO3 ART 24.0   BASE EXCESS ART -0.6*   CARBOXYHEMOGLOBIN 3.0*     UA    Urinalysis 5/10/21   Ketones, UA Negative   Leukocytes, UA Negative             Microbiology Results (last 10 days)     Procedure Component Value - Date/Time    COVID PRE-OP / PRE-PROCEDURE SCREENING ORDER (NO ISOLATION) - Swab, Nasopharynx [[id number]]  (Normal) Collected: 09/02/21 0314    Lab Status: Final result Specimen: Swab from Nasopharynx Updated: 09/02/21 0416    Narrative:      The following orders were created for panel order COVID PRE-OP / PRE-PROCEDURE SCREENING ORDER (NO ISOLATION) - Swab, Nasopharynx.  Procedure                               Abnormality         Status                     ---------                               -----------         ------                     COVID-19 and FLU A/B PCR...[[id number]]  Normal              Final result                 Please view results for these tests on the individual orders.    COVID-19 and FLU A/B PCR - Swab, Nasopharynx [[id number]]  (Normal) Collected: 09/02/21 0314    Lab Status: Final result Specimen: Swab from Nasopharynx Updated: 09/02/21 0416     COVID19 Not Detected     Influenza A PCR Not Detected     Influenza B PCR Not Detected    Narrative:      Fact sheet for providers: https://www.fda.gov/media/463109/download    Fact sheet for patients: https://www.fda.gov/media/977253/download    Test performed by PCR.    COVID  PRE-OP / PRE-PROCEDURE SCREENING ORDER (NO ISOLATION) - Swab, Nasopharynx [[id number]]  (Normal) Collected: 09/02/21 0300    Lab Status: Final result Specimen: Swab from Nasopharynx Updated: 09/02/21 0556    Narrative:      The following orders were created for panel order COVID PRE-OP / PRE-PROCEDURE SCREENING ORDER (NO ISOLATION) - Swab, Nasopharynx.  Procedure                               Abnormality         Status                     ---------                               -----------         ------                     Respiratory Panel PCR w/...[[id number]]  Normal              Final result                 Please view results for these tests on the individual orders.    Respiratory Panel PCR w/COVID-19(SARS-CoV-2) KARIN/HUI/VINOD/PAD/COR/MAD/AMANDA In-House, NP Swab in UTM/VTM, 3-4 HR TAT - Swab, Nasopharynx [[id number]]  (Normal) Collected: 09/02/21 0300    Lab Status: Final result Specimen: Swab from Nasopharynx Updated: 09/02/21 0556     ADENOVIRUS, PCR Not Detected     Coronavirus 229E Not Detected     Coronavirus HKU1 Not Detected     Coronavirus NL63 Not Detected     Coronavirus OC43 Not Detected     COVID19 Not Detected     Human Metapneumovirus Not Detected     Human Rhinovirus/Enterovirus Not Detected     Influenza A PCR Not Detected     Influenza B PCR Not Detected     Parainfluenza Virus 1 Not Detected     Parainfluenza Virus 2 Not Detected     Parainfluenza Virus 3 Not Detected     Parainfluenza Virus 4 Not Detected     RSV, PCR Not Detected     Bordetella pertussis pcr Not Detected     Bordetella parapertussis PCR Not Detected     Chlamydophila pneumoniae PCR Not Detected     Mycoplasma pneumo by PCR Not Detected    Narrative:      In the setting of a positive respiratory panel with a viral infection PLUS a negative procalcitonin without other underlying concern for bacterial infection, consider observing off antibiotics or discontinuation of antibiotics and continue supportive care. If the respiratory  panel is positive for atypical bacterial infection (Bordetella pertussis, Chlamydophila pneumoniae, or Mycoplasma pneumoniae), consider antibiotic de-escalation to target atypical bacterial infection.          XR Chest 1 View    Result Date: 9/2/2021  CR Chest 1 Vw INDICATION: Short of air. Tobacco abuse and hypertension. COMPARISON:  None available. FINDINGS: Single portable AP view(s) of the chest.  Portions of both lung apices excluded from view. Cardiac silhouette is within normal limits. The vascularity is unremarkable and the lungs are clear. No pneumothorax.     Impression: 1. Negative chest. Signer Name: Dex Harmon MD  Signed: 9/2/2021 1:19 AM  Workstation Name: [id number]  [facility]    CT Angiogram Chest    Result Date: 9/2/2021  CTA Chest INDICATION: Chest pain short of air. Flulike symptoms and hypoxia. COVID- 19 suspected. TECHNIQUE: CT angiogram of the chest with IV contrast. 3-D reconstructions were obtained and reviewed.   Radiation dose reduction techniques included automated exposure control or exposure modulation based on body size. Count of known CT and cardiac nuc med studies performed in previous 12 months: 0. COMPARISON: None available. FINDINGS: Study degraded by poor bolus timing and motion degradation. Images were repeated. Best study possible. The lungs are clear. There is no effusion. No pneumothorax. There is no pericardial effusion. There is no threshold adenopathy. The included abdomen demonstrates splenomegaly and probable hepatic steatosis. The aorta maintains normal caliber. There is no dissection. Suboptimal bolus timing for evaluation of the pulmonary arteries. Within study limitations, no large filling defect in the main left or right central pulmonary arteries. The more distal pulmonary arteries are suboptimally opacified or assessed there is no suspicious bone lesion.  negative.     Impression: 1. Motion degradation and suboptimal bolus  timing. 2. No aortic aneurysm or dissection. 3. No large filling defect in the main left or right central pulmonary arteries. 4. Splenomegaly and hepatic steatosis. Signer Name: Dex Harmon MD  Signed: 9/2/2021 4:24 AM  Workstation Name: [email or web address]  [facility]          Assessment/Plan   Assessment & Plan     Active Hospital Problems    Diagnosis  POA   • Viral respiratory infection [J98.8, B97.89]  Yes       41-year-old unvaccinated smoker presents with 1 week of shortness of breath and cough after recent exposure to COVID-19 by coworker.    Shortness of breath  Bronchospasm  Tobacco abuse  Hypoxia  -CT angiogram of the chest negative for pulmonary embolism or infiltrate  -Initial COVID-19 testing negative for the novel coronavirus or influenza  -We will repeat COVID-19 testing to include the full respiratory panel PCR  -Continue Decadron  -Albuterol MDI scheduled  -Nicotine replacement, counseled smoking cessation    Obesity  -BMI 55, body habitus may also contribute to shortness of breath    Hypertension    Hepatic Steatosis and splenomegaly  - discussed with patient, including lifestyle and dietary changes, as well as the risk this can progress to more serious liver disease including cirrhosis. Follow up with PCP with consideration of referral to Gastroenterology, particularly since the patient now has splenomegaly. Also consider outpatient liver ultrasound with elastography.    DVT prophylaxis: Lovenox      CODE STATUS: Full  There are no questions and answers to display.       Admission Status:  I believe this patient meets OBSERVATION status, however if further evaluation or treatment plans warrant, status may change.  Based upon current information, I predict patient's care encounter to be less than or equal to 2 midnights.      Weston Roy MD  09/02/21      Electronically signed by Weston Roy MD at 09/02/21 9509         Lab Results (last 24 hours)     Procedure  Component Value Units Date/Time    Basic Metabolic Panel [[id number]]  (Abnormal) Collected: 09/03/21 0920    Specimen: Blood Updated: 09/03/21 1146     Glucose 208 mg/dL      BUN 12 mg/dL      Creatinine 0.74 mg/dL      Sodium 137 mmol/L      Potassium 4.4 mmol/L      Comment: Slight hemolysis detected by analyzer. Results may be affected.        Chloride 102 mmol/L      CO2 21.0 mmol/L      Calcium 9.3 mg/dL      eGFR Non African Amer 86 mL/min/1.73      BUN/Creatinine Ratio 16.2     Anion Gap 14.0 mmol/L     Narrative:      GFR Normal >60  Chronic Kidney Disease <60  Kidney Failure <15      Hemoglobin A1c [[id number]]  (Normal) Collected: 09/03/21 0920    Specimen: Blood Updated: 09/03/21 1141     Hemoglobin A1C 5.20 %     Narrative:      Hemoglobin A1C Ranges:    Increased Risk for Diabetes  5.7% to 6.4%  Diabetes                     >= 6.5%  Diabetic Goal                < 7.0%    CBC (No Diff) [[id number]]  (Abnormal) Collected: 09/03/21 1056    Specimen: Blood Updated: 09/03/21 1111     WBC 14.66 10*3/mm3      RBC 4.41 10*6/mm3      Hemoglobin 13.7 g/dL      Hematocrit 42.3 %      MCV 95.9 fL      MCH 31.1 pg      MCHC 32.4 g/dL      RDW 14.0 %      RDW-SD 49.3 fl      MPV 8.8 fL      Platelets 309 10*3/mm3         Imaging Results (Last 24 Hours)     ** No results found for the last 24 hours. **        Physician Progress Notes (last 24 hours) (Notes from 09/02/21 1341 through 09/03/21 1341)    No notes of this type exist for this encounter.         Consult Notes (last 24 hours) (Notes from 09/02/21 1341 through 09/03/21 1341)    No notes of this type exist for this encounter.

## 2021-09-03 NOTE — CASE MANAGEMENT/SOCIAL WORK
Continued Stay Note  Psychiatric     Patient Name: Starr Ruiz  MRN: [id number]  Today's Date: 9/3/2021    Admit Date: 9/2/2021    Discharge Plan     Row Name 09/03/21 1349       Plan    Plan  Home    Patient/Family in Agreement with Plan  yes    Plan Comments  Spoke with pt at bedside. Pt's states her plan is to D/C to home. Pt states her mom or daughter will transport her. Nurse has weened her to RA. Pt denies any D/C needs at this time.    Final Discharge Disposition Code  01 - home or self-care        Discharge Codes    No documentation.             Cindy Davis RN

## 2021-09-04 ENCOUNTER — APPOINTMENT (OUTPATIENT)
Dept: CARDIOLOGY | Facility: HOSPITAL | Age: 41
End: 2021-09-04

## 2021-09-04 LAB
ALBUMIN SERPL-MCNC: 3.9 G/DL (ref 3.5–5.2)
ANION GAP SERPL CALCULATED.3IONS-SCNC: 12 MMOL/L (ref 5–15)
ASCENDING AORTA: 3.6 CM
BASOPHILS # BLD AUTO: 0.02 10*3/MM3 (ref 0–0.2)
BASOPHILS NFR BLD AUTO: 0.1 % (ref 0–1.5)
BH CV ECHO MEAS - AO MAX PG (FULL): 3.5 MMHG
BH CV ECHO MEAS - AO MAX PG: 8 MMHG
BH CV ECHO MEAS - AO MEAN PG (FULL): 1.9 MMHG
BH CV ECHO MEAS - AO MEAN PG: 4.4 MMHG
BH CV ECHO MEAS - AO ROOT AREA (BSA CORRECTED): 1.1
BH CV ECHO MEAS - AO ROOT AREA: 7.4 CM^2
BH CV ECHO MEAS - AO ROOT DIAM: 3.1 CM
BH CV ECHO MEAS - AO V2 MAX: 143.5 CM/SEC
BH CV ECHO MEAS - AO V2 MEAN: 97.4 CM/SEC
BH CV ECHO MEAS - AO V2 VTI: 30.9 CM
BH CV ECHO MEAS - ASC AORTA: 3.6 CM
BH CV ECHO MEAS - BSA(HAYCOCK): 3 M^2
BH CV ECHO MEAS - BSA: 2.7 M^2
BH CV ECHO MEAS - BZI_BMI: 55.7 KILOGRAMS/M^2
BH CV ECHO MEAS - BZI_METRIC_HEIGHT: 175 CM
BH CV ECHO MEAS - BZI_METRIC_WEIGHT: 170.6 KG
BH CV ECHO MEAS - EDV(CUBED): 130 ML
BH CV ECHO MEAS - EDV(MOD-SP4): 136 ML
BH CV ECHO MEAS - EDV(TEICH): 121.9 ML
BH CV ECHO MEAS - EF(CUBED): 64.6 %
BH CV ECHO MEAS - EF(MOD-SP4): 55.9 %
BH CV ECHO MEAS - EF(TEICH): 55.8 %
BH CV ECHO MEAS - ESV(CUBED): 46 ML
BH CV ECHO MEAS - ESV(MOD-SP4): 60 ML
BH CV ECHO MEAS - ESV(TEICH): 53.8 ML
BH CV ECHO MEAS - FS: 29.3 %
BH CV ECHO MEAS - IVS/LVPW: 0.79
BH CV ECHO MEAS - IVSD: 1 CM
BH CV ECHO MEAS - LA DIMENSION: 4 CM
BH CV ECHO MEAS - LA/AO: 1.4
BH CV ECHO MEAS - LAD MAJOR: 4.7 CM
BH CV ECHO MEAS - LAT PEAK E' VEL: 11.3 CM/SEC
BH CV ECHO MEAS - LATERAL E/E' RATIO: 11.2
BH CV ECHO MEAS - LV DIASTOLIC VOL/BSA (35-75): 50.4 ML/M^2
BH CV ECHO MEAS - LV IVRT: 0.1 SEC
BH CV ECHO MEAS - LV MASS(C)D: 235.2 GRAMS
BH CV ECHO MEAS - LV MASS(C)DI: 87.2 GRAMS/M^2
BH CV ECHO MEAS - LV MAX PG: 4.5 MMHG
BH CV ECHO MEAS - LV MEAN PG: 2.5 MMHG
BH CV ECHO MEAS - LV SYSTOLIC VOL/BSA (12-30): 22.2 ML/M^2
BH CV ECHO MEAS - LV V1 MAX: 105.7 CM/SEC
BH CV ECHO MEAS - LV V1 MEAN: 74.9 CM/SEC
BH CV ECHO MEAS - LV V1 VTI: 24 CM
BH CV ECHO MEAS - LVIDD: 5.1 CM
BH CV ECHO MEAS - LVIDS: 3.6 CM
BH CV ECHO MEAS - LVLD AP4: 8.5 CM
BH CV ECHO MEAS - LVLS AP4: 7.3 CM
BH CV ECHO MEAS - LVPWD: 1.3 CM
BH CV ECHO MEAS - MED PEAK E' VEL: 9.2 CM/SEC
BH CV ECHO MEAS - MEDIAL E/E' RATIO: 13.6
BH CV ECHO MEAS - MV A MAX VEL: 77.7 CM/SEC
BH CV ECHO MEAS - MV DEC SLOPE: 550.9 CM/SEC^2
BH CV ECHO MEAS - MV DEC TIME: 0.23 SEC
BH CV ECHO MEAS - MV E MAX VEL: 126 CM/SEC
BH CV ECHO MEAS - MV E/A: 1.6
BH CV ECHO MEAS - PA ACC TIME: 0.12 SEC
BH CV ECHO MEAS - PA MAX PG: 4.3 MMHG
BH CV ECHO MEAS - PA PR(ACCEL): 26.7 MMHG
BH CV ECHO MEAS - PA V2 MAX: 103.2 CM/SEC
BH CV ECHO MEAS - SI(AO): 84.1 ML/M^2
BH CV ECHO MEAS - SI(CUBED): 31.1 ML/M^2
BH CV ECHO MEAS - SI(MOD-SP4): 28.2 ML/M^2
BH CV ECHO MEAS - SI(TEICH): 25.2 ML/M^2
BH CV ECHO MEAS - SV(AO): 227.1 ML
BH CV ECHO MEAS - SV(CUBED): 84 ML
BH CV ECHO MEAS - SV(MOD-SP4): 76 ML
BH CV ECHO MEAS - SV(TEICH): 68 ML
BH CV ECHO MEAS - TAPSE (>1.6): 2.5 CM
BH CV ECHO MEASUREMENTS AVERAGE E/E' RATIO: 12.29
BH CV XLRA - RV BASE: 3.6 CM
BH CV XLRA - RV LENGTH: 7.1 CM
BH CV XLRA - RV MID: 3.6 CM
BH CV XLRA - TDI S': 15 CM/SEC
BUN SERPL-MCNC: 18 MG/DL (ref 6–20)
BUN/CREAT SERPL: 22 (ref 7–25)
CALCIUM SPEC-SCNC: 9.2 MG/DL (ref 8.6–10.5)
CHLORIDE SERPL-SCNC: 103 MMOL/L (ref 98–107)
CO2 SERPL-SCNC: 24 MMOL/L (ref 22–29)
CREAT SERPL-MCNC: 0.82 MG/DL (ref 0.57–1)
DEPRECATED RDW RBC AUTO: 49.2 FL (ref 37–54)
EOSINOPHIL # BLD AUTO: 0 10*3/MM3 (ref 0–0.4)
EOSINOPHIL NFR BLD AUTO: 0 % (ref 0.3–6.2)
ERYTHROCYTE [DISTWIDTH] IN BLOOD BY AUTOMATED COUNT: 14.1 % (ref 12.3–15.4)
GFR SERPL CREATININE-BSD FRML MDRD: 77 ML/MIN/1.73
GLUCOSE SERPL-MCNC: 171 MG/DL (ref 65–99)
HCT VFR BLD AUTO: 39.9 % (ref 34–46.6)
HGB BLD-MCNC: 13.1 G/DL (ref 12–15.9)
IMM GRANULOCYTES # BLD AUTO: 0.2 10*3/MM3 (ref 0–0.05)
IMM GRANULOCYTES NFR BLD AUTO: 1.5 % (ref 0–0.5)
IVRT: 102 MSEC
LEFT ATRIUM VOLUME INDEX: 15 ML/M2
LEFT ATRIUM VOLUME: 41 CM3
LV EF 2D ECHO EST: 60 %
LYMPHOCYTES # BLD AUTO: 0.97 10*3/MM3 (ref 0.7–3.1)
LYMPHOCYTES NFR BLD AUTO: 7.1 % (ref 19.6–45.3)
MAXIMAL PREDICTED HEART RATE: 179 BPM
MCH RBC QN AUTO: 31.4 PG (ref 26.6–33)
MCHC RBC AUTO-ENTMCNC: 32.8 G/DL (ref 31.5–35.7)
MCV RBC AUTO: 95.7 FL (ref 79–97)
MONOCYTES # BLD AUTO: 0.32 10*3/MM3 (ref 0.1–0.9)
MONOCYTES NFR BLD AUTO: 2.4 % (ref 5–12)
NEUTROPHILS NFR BLD AUTO: 12.1 10*3/MM3 (ref 1.7–7)
NEUTROPHILS NFR BLD AUTO: 88.9 % (ref 42.7–76)
NRBC BLD AUTO-RTO: 0 /100 WBC (ref 0–0.2)
PHOSPHATE SERPL-MCNC: 2.7 MG/DL (ref 2.5–4.5)
PLATELET # BLD AUTO: 294 10*3/MM3 (ref 140–450)
PMV BLD AUTO: 8.8 FL (ref 6–12)
POTASSIUM SERPL-SCNC: 4.3 MMOL/L (ref 3.5–5.2)
RBC # BLD AUTO: 4.17 10*6/MM3 (ref 3.77–5.28)
SODIUM SERPL-SCNC: 139 MMOL/L (ref 136–145)
STRESS TARGET HR: 152 BPM
WBC # BLD AUTO: 13.61 10*3/MM3 (ref 3.4–10.8)

## 2021-09-04 PROCEDURE — 25010000002 ENOXAPARIN PER 10 MG

## 2021-09-04 PROCEDURE — 93306 TTE W/DOPPLER COMPLETE: CPT

## 2021-09-04 PROCEDURE — 97166 OT EVAL MOD COMPLEX 45 MIN: CPT

## 2021-09-04 PROCEDURE — 85025 COMPLETE CBC W/AUTO DIFF WBC: CPT | Performed by: INTERNAL MEDICINE

## 2021-09-04 PROCEDURE — 87206 SMEAR FLUORESCENT/ACID STAI: CPT | Performed by: INTERNAL MEDICINE

## 2021-09-04 PROCEDURE — 97161 PT EVAL LOW COMPLEX 20 MIN: CPT

## 2021-09-04 PROCEDURE — 94799 UNLISTED PULMONARY SVC/PX: CPT

## 2021-09-04 PROCEDURE — 80069 RENAL FUNCTION PANEL: CPT | Performed by: INTERNAL MEDICINE

## 2021-09-04 PROCEDURE — 99233 SBSQ HOSP IP/OBS HIGH 50: CPT | Performed by: INTERNAL MEDICINE

## 2021-09-04 PROCEDURE — 25010000002 DEXAMETHASONE PER 1 MG: Performed by: INTERNAL MEDICINE

## 2021-09-04 PROCEDURE — 87102 FUNGUS ISOLATION CULTURE: CPT | Performed by: INTERNAL MEDICINE

## 2021-09-04 PROCEDURE — 93306 TTE W/DOPPLER COMPLETE: CPT | Performed by: INTERNAL MEDICINE

## 2021-09-04 PROCEDURE — 25010000002 FUROSEMIDE PER 20 MG: Performed by: INTERNAL MEDICINE

## 2021-09-04 RX ORDER — FUROSEMIDE 10 MG/ML
60 INJECTION INTRAMUSCULAR; INTRAVENOUS ONCE
Status: COMPLETED | OUTPATIENT
Start: 2021-09-04 | End: 2021-09-04

## 2021-09-04 RX ADMIN — ALBUTEROL SULFATE 2.5 MG: 2.5 SOLUTION RESPIRATORY (INHALATION) at 06:11

## 2021-09-04 RX ADMIN — GUAIFENESIN 600 MG: 600 TABLET, EXTENDED RELEASE ORAL at 08:54

## 2021-09-04 RX ADMIN — FUROSEMIDE 60 MG: 10 INJECTION INTRAMUSCULAR; INTRAVENOUS at 08:54

## 2021-09-04 RX ADMIN — SODIUM CHLORIDE, PRESERVATIVE FREE 10 ML: 5 INJECTION INTRAVENOUS at 22:25

## 2021-09-04 RX ADMIN — DEXAMETHASONE SODIUM PHOSPHATE 8 MG: 4 INJECTION, SOLUTION INTRA-ARTICULAR; INTRALESIONAL; INTRAMUSCULAR; INTRAVENOUS; SOFT TISSUE at 04:39

## 2021-09-04 RX ADMIN — ALBUTEROL SULFATE 2.5 MG: 2.5 SOLUTION RESPIRATORY (INHALATION) at 14:47

## 2021-09-04 RX ADMIN — GUAIFENESIN 600 MG: 600 TABLET, EXTENDED RELEASE ORAL at 22:24

## 2021-09-04 RX ADMIN — ENOXAPARIN SODIUM 40 MG: 40 INJECTION SUBCUTANEOUS at 08:53

## 2021-09-04 RX ADMIN — LOSARTAN POTASSIUM 100 MG: 50 TABLET, FILM COATED ORAL at 08:54

## 2021-09-04 RX ADMIN — DEXAMETHASONE SODIUM PHOSPHATE 8 MG: 4 INJECTION, SOLUTION INTRA-ARTICULAR; INTRALESIONAL; INTRAMUSCULAR; INTRAVENOUS; SOFT TISSUE at 14:45

## 2021-09-04 RX ADMIN — ALBUTEROL SULFATE 2.5 MG: 2.5 SOLUTION RESPIRATORY (INHALATION) at 19:34

## 2021-09-04 RX ADMIN — Medication 1 PATCH: at 12:20

## 2021-09-04 RX ADMIN — ENOXAPARIN SODIUM 40 MG: 40 INJECTION SUBCUTANEOUS at 22:24

## 2021-09-04 RX ADMIN — TRAZODONE HYDROCHLORIDE 150 MG: 100 TABLET ORAL at 22:24

## 2021-09-04 RX ADMIN — ESCITALOPRAM OXALATE 10 MG: 10 TABLET ORAL at 08:54

## 2021-09-04 NOTE — THERAPY DISCHARGE NOTE
Acute Care - Occupational Therapy Discharge  [facility]    Patient Name: Starr Ruiz  : 1980    MRN: [id number]                              Today's Date: 2021       Admit Date: 2021    Visit Dx:     ICD-10-CM ICD-9-CM   1. Viral respiratory infection  J98.8 079.99    B97.89    2. Chest tightness  R07.89 786.59   3. Shortness of breath  R06.02 786.05   4. Hypoxia  R09.02 799.02   5. Morbid obesity (CMS/HCC)  E66.01 278.01   6. Tobacco dependence  F17.200 305.1   7. History of asthma  Z87.09 V12.69   8. Anxiety and depression  F41.9 300.00    F32.9 311   9. Encntr for obs for susp expsr to oth biolg agents ruled out  Z03.818 V71.83     Patient Active Problem List   Diagnosis   • Anxiety   • Tobacco abuse   • Morbid obesity due to excess calories (CMS/HCC)   • Need for Tdap vaccination   • Grief reaction   • Perianal fistula   • Compulsive tobacco user syndrome   • BP (high blood pressure)   • Extreme obesity   • Urinary frequency   • Anxiety   • Flushing   • Dizziness   • Breast tenderness in female   • Essential hypertension   • Right upper quadrant abdominal mass   • Annual physical exam   • Cervical cancer screening   • Insomnia   • Encounter for screening for malignant neoplasm of breast   • Viral respiratory infection     Past Medical History:   Diagnosis Date   • Anxiety    • Depression    • Hypertension      Past Surgical History:   Procedure Laterality Date   • D & C HYSTEROSCOPY WITH NOVASURE ENDOMETRIAL ABLATION AND MYOSURE     • LEEP     • TOOTH EXTRACTION     • TUBAL ABDOMINAL LIGATION       General Information     Row Name 21 0755          OT Time and Intention    Document Type  discharge evaluation/summary  -TA     Mode of Treatment  occupational therapy  -TA     Row Name 21 0755          General Information    Patient Profile Reviewed  yes  -TA     Prior Level of Function  independent:  -TA     Existing Precautions/Restrictions  no known  precautions/restrictions  -TA     Barriers to Rehab  none identified  -TA     Row Name 09/04/21 0755          Occupational Profile    Reason for Services/Referral (Occupational Profile)  SOA x 1 week  -TA     Patient Goals (Occupational Profile)  Return home  -TA     Row Name 09/04/21 0755          Living Environment    Lives With  child(sunni), dependent  -TA     Row Name 09/04/21 0755          Home Main Entrance    Number of Stairs, Main Entrance  none Has ramp at entrance  -TA     Row Name 09/04/21 0755          Stairs Within Home, Primary    Number of Stairs, Within Home, Primary  none  -TA     Row Name 09/04/21 0755          Cognition    Orientation Status (Cognition)  oriented x 4  -TA     Row Name 09/04/21 0755          Safety Issues, Functional Mobility    Impairments Affecting Function (Mobility)  shortness of breath  -TA       User Key  (r) = Recorded By, (t) = Taken By, (c) = Cosigned By    Initials Name Provider Type    Donald Bradley, OT Occupational Therapist        Mobility/ADL's     Row Name 09/04/21 0755          Bed Mobility    Bed Mobility  supine-sit;sit-supine;scooting/bridging  -TA     Scooting/Bridging [place] (Bed Mobility)  independent  -TA     Supine-Sit [place] (Bed Mobility)  independent  -TA     Sit-Supine [place] (Bed Mobility)  independent  -TA     Row Name 09/04/21 0755          Transfers    Transfers  sit-stand transfer;toilet transfer  -TA     Sit-Stand [place] (Transfers)  independent  -TA     [place] Level (Toilet Transfer)  independent  -TA     Row Name 09/04/21 0755          Sit-Stand Transfer    Assistive Device (Sit-Stand Transfers)  -- No AD  -TA     [facility] Name 09/04/21 0755          Toilet Transfer    Type (Toilet Transfer)  sit-stand;stand-sit  -TA     [facility] Name 09/04/21 0755          Functional Mobility    Functional Mobility- Ind. Level  independent  -TA     Functional Mobility- Device  -- No AD  -TA     Functional Mobility-Distance (Feet)  30   -TA     Functional Mobility- Safety Issues  supplemental O2  -TA     Row Name 09/04/21 0755          Activities of Daily Living    BADL Assessment/Intervention  lower body dressing;grooming;toileting  -TA     [facility] Name 09/04/21 [facility]          Lower Body Dressing Assessment/Training    [place] Level (Lower Body Dressing)  don;doff;shoes/slippers;independent  -TA     Position (Lower Body Dressing)  edge of bed sitting  -TA     [facility] Name 09/04/21 0755          Grooming Assessment/Training    [place] Level (Grooming)  wash face, hands;independent  -TA     Position (Grooming)  sink side;unsupported sitting  -TA     [facility] Name 09/04/21 0755          Toileting Assessment/Training    [place] Level (Toileting)  toileting skills;independent  -TA     Position (Toileting)  unsupported sitting;unsupported standing  -TA       User Key  (r) = Recorded By, (t) = Taken By, (c) = Cosigned By    Initials Name Provider Type    Donald Bradley OT Occupational Therapist        Obj/Interventions     [facility] Name 09/04/21 0755          Sensory Assessment (Somatosensory)    Sensory Assessment (Somatosensory)  bilateral UE;sensation intact  -TA     Row Name 09/04/21 0755          Vision Assessment/Intervention    Visual Impairment/Limitations  WFL  -TA     [facility] Name 09/04/21 0755          Range of Motion Comprehensive    General Range of Motion  bilateral upper extremity ROM WFL  -TA     Row Name 09/04/21 [facility]          Strength Comprehensive (MMT)    General Manual Muscle Testing (MMT) Assessment  no strength deficits identified  -TA     Comment, General Manual Muscle Testing (MMT) Assessment  BUE 5/5  -TA     Row Name 09/04/21 0755          Balance    Balance Assessment  sitting dynamic balance;standing dynamic balance  -TA     Dynamic Sitting Balance  WNL;sitting, edge of bed  -TA     Dynamic Standing Balance  WNL;unsupported;standing  -TA     Balance Interventions  sit to stand;occupation based/functional task;weight shifting  activity  -TA       User Key  (r) = Recorded By, (t) = Taken By, (c) = Cosigned By    Initials Name Provider Type    TA Donald Crawford, OT Occupational Therapist        Goals/Plan     Row Name 09/04/21 [facility]          Problem Specific Goal 1 (OT)    Problem Specific Goal 1 (OT)  Pt will demonstrate good understanding of adaptive brathing technique to assist with mgt of PRAKASH.  -TA     Time Frame (Problem Specific Goal 1, OT)  1 day  -TA     Progress/Outcome (Problem Specific Goal 1, OT)  goal met  -TA     Row Name 09/04/21 [facility]          Therapy Assessment/Plan (OT)    Planned Therapy Interventions (OT)  patient/caregiver education/training  -TA       User Key  (r) = Recorded By, (t) = Taken By, (c) = Cosigned By    Initials Name Provider Type    Donald Bradley, OT Occupational Therapist        Clinical Impression     Row Name 09/04/21 [facility]          Pain Assessment    Additional Documentation  Pain Scale: Numbers Pre/Post-Treatment (Group)  -TA     Row Name 09/04/21 [facility]          Pain Scale: Numbers Pre/Post-Treatment    Pretreatment Pain Rating  0/10 - no pain  -TA     Posttreatment Pain Rating  0/10 - no pain  -TA     Pre/Posttreatment Pain Comment  Pt denied pain, tolerated  -TA     Pain Intervention(s)  Ambulation/increased activity;Repositioned  -TA     Row Name 09/04/21 [facility]          Plan of Care Review    Plan of Care Reviewed With  patient  -TA     Progress  improving  -TA     Outcome Summary  VSS; Pt presents at/near fxl baseline of independent with ADL performance, fxl mobility for ADLs. Pt limited by mild PRAKASH and was educated re: adaptive breathing to assist with mgt of SOA, with pt demostrating good understanding of technique. No AE/DME needs identified. No further acute skilled OT services indicated at this time. Recommend home at discharge.  -TA     Row Name 09/04/21 0752          Therapy Assessment/Plan (OT)    Patient/Family Therapy Goal Statement (OT)  Return home  -TA     Criteria for  Skilled Therapeutic Interventions Met (OT)  no;no problems identified which require skilled intervention  -TA     Therapy Frequency (OT)  evaluation only  -TA     Predicted Duration of Therapy Intervention (OT)  1 day  -TA     Row Name 09/04/21 0755          Therapy Plan Review/Discharge Plan (OT)    Anticipated Discharge Disposition (OT)  home  -TA     Row Name 09/04/21 0755          Vital Signs    Pre Systolic BP Rehab  -- VSS; RN cleared pt for tx  -TA     Pre SpO2 (%)  90  -TA     O2 Delivery Pre Treatment  supplemental O2  -TA     Intra SpO2 (%)  90  -TA     O2 Delivery Intra Treatment  supplemental O2  -TA     Post SpO2 (%)  91  -TA     O2 Delivery Post Treatment  supplemental O2  -TA     Pre Patient Position  Supine  -TA     Intra Patient Position  Standing  -TA     Post Patient Position  Supine  -TA     Row Name 09/04/21 0755          Positioning and Restraints    Pre-Treatment Position  in bed  -TA     Post Treatment Position  bed  -TA     In Bed  notified nsg;fowlers;call light within reach;encouraged to call for assist;side rails up x2;legs elevated  -TA       User Key  (r) = Recorded By, (t) = Taken By, (c) = Cosigned By    Initials Name Provider Type    Donald Bradley, OT Occupational Therapist        Outcome Measures     Row Name 09/04/21 0755          How much help from another is currently needed...    Putting on and taking off regular lower body clothing?  4  -TA     Bathing (including washing, rinsing, and drying)  4  -TA     Toileting (which includes using toilet bed pan or urinal)  4  -TA     Putting on and taking off regular upper body clothing  4  -TA     Taking care of personal grooming (such as brushing teeth)  4  -TA     Eating meals  4  -TA     AM-PAC 6 Clicks Score (OT)  24  -TA     Row Name 09/04/21 0755          Functional Assessment    Outcome Measure Options  AM-PAC 6 Clicks Daily Activity (OT)  -TA       User Key  (r) = Recorded By, (t) = Taken By, (c) = Cosigned By     Initials Name Provider Type    Donald Bradley OT Occupational Therapist        Occupational Therapy Education                 Title: PT OT SLP Therapies (Done)     Topic: Occupational Therapy (Done)     Point: ADL training (Done)     Description:   Instruct learner(s) on proper safety adaptation and remediation techniques during self care or transfers.   Instruct in proper use of assistive devices.              Learning Progress Summary           Patient Acceptance, E,TB, VU by TA at 9/4/2021 0834    Comment: adaptive breathing with exertion to assist with mgt of SOA                               User Key     Initials Effective Dates Name Provider Type Discipline    KERRY 06/16/21 -  Donald Crawford OT Occupational Therapist OT              OT Recommendation and Plan  Retired Outcome Summary/Treatment Plan (OT)  Anticipated Discharge Disposition (OT): home  Planned Therapy Interventions (OT): patient/caregiver education/training  Therapy Frequency (OT): evaluation only  Plan of Care Review  Plan of Care Reviewed With: patient  Progress: improving  Outcome Summary: VSS; Pt presents at/near fxl baseline of independent with ADL performance, fxl mobility for ADLs. Pt limited by mild PRAKASH and was educated re: adaptive breathing to assist with mgt of SOA, with pt demostrating good understanding of technique. No AE/DME needs identified. No further acute skilled OT services indicated at this time. Recommend home at discharge.  Plan of Care Reviewed With: patient  Outcome Summary: VSS; Pt presents at/near fxl baseline of independent with ADL performance, fxl mobility for ADLs. Pt limited by mild PRAKASH and was educated re: adaptive breathing to assist with mgt of SOA, with pt demostrating good understanding of technique. No AE/DME needs identified. No further acute skilled OT services indicated at this time. Recommend home at discharge.     Time Calculation:   Time Calculation- OT     Row Name 09/04/21 0755              Time Calculation- OT    OT Start Time  0755 ttc 0 minutes  -TA      Total Timed Code Minutes- OT  0 minute(s)  -TA      OT Received On  09/04/21  -TA         Untimed Charges    OT Eval/Re-eval Minutes  46  -TA         Total Minutes    Untimed Charges Total Minutes  46  -TA       Total Minutes  46  -TA        User Key  (r) = Recorded By, (t) = Taken By, (c) = Cosigned By    Initials Name Provider Type    TA Donald Crawford, OT Occupational Therapist        Therapy Charges for Today     Code Description Service Date Service Provider Modifiers Qty    26202391036 HC OT EVAL MOD COMPLEXITY 4 9/4/2021 Donald Crawford OT GO 1               Donald Crawford OT  9/4/2021

## 2021-09-04 NOTE — PLAN OF CARE
Goal Outcome Evaluation:  Plan of Care Reviewed With: patient        Progress: no change  Outcome Summary: PT eval performed: Pt presenting with SOA and decline in functional mobility.  Indep with bed mobility.  Pt able to ambulate 60', SBA on 4LO2.  Pt with slow, cautious gait, 1 episode of LOB with turning but patient was able to indep recover stability.  Pt desats into upper 80s on 2L O2, thus required 4LO2 to maintain SpO2 in the 90s with mobility.  Pt may need O2 at d/c pending progress.  Pt may also benefit from AD training with [facility] for energy conservation/ assist with SOA, plan for at least 1 more visit IPPT and d/c home with assist.

## 2021-09-04 NOTE — PLAN OF CARE
Problem: Adult Inpatient Plan of Care  Goal: Plan of Care Review  Recent Flowsheet Documentation  Taken 9/4/2021 0755 by Donald Crawford, OT  Progress: improving  Plan of Care Reviewed With: patient  Outcome Summary:   VSS   Pt presents at/near fxl baseline of independent with ADL performance, fxl mobility for ADLs. Pt limited by mild PRAKASH and was educated re: adaptive breathing to assist with mgt of SOA, with pt demostrating good understanding of technique. No AE/DME needs identified. No further acute skilled OT services indicated at this time. Recommend home at discharge.   Goal Outcome Evaluation:  Plan of Care Reviewed With: patient        Progress: improving  Outcome Summary: VSS; Pt presents at/near fxl baseline of independent with ADL performance, fxl mobility for ADLs. Pt limited by mild PRAKASH and was educated re: adaptive breathing to assist with mgt of SOA, with pt demostrating good understanding of technique. No AE/DME needs identified. No further acute skilled OT services indicated at this time. Recommend home at discharge.

## 2021-09-04 NOTE — PROGRESS NOTES
[facility] Medicine Services  PROGRESS NOTE    Patient Name: Starr Ruiz  : 1980  MRN: [id number]    Date of Admission: 2021  Primary Care Physician: Nahed Molina MD    Subjective   Subjective     CC:  dyspnea    HPI:  Now on room air, was able to take a shower but still with some dyspnea    ROS:  Gen- No fevers, chills  CV- No chest pain, palpitations  Resp- No cough, + dyspnea  GI- No N/V/D, abd pain        Objective   Objective     Vital Signs:   Temp:  [97.9 °F (36.6 °C)-98.7 °F (37.1 °C)] 98.4 °F (36.9 °C)  Heart Rate:  [61-99] 61  Resp:  [18] 18  BP: (118-137)/(65-84) 128/79  Flow (L/min):  [1.5-2] 2     Physical Exam:  Constitutional: No acute distress, awake, alert  HENT: NCAT, mucous membranes moist  Respiratory: Decreased BS bilaterally, respiratory effort normal   Cardiovascular: RRR, no murmurs, rubs, or gallops  Gastrointestinal: Positive bowel sounds, soft, nontender, obese  Musculoskeletal: LE edema improved  Psychiatric: Appropriate affect, cooperative  Neurologic: Oriented x 3, speech clear  Skin: No rashes      Results Reviewed:  LAB RESULTS:      Lab 21  1056 21  0436 21  0314 21  2233   WBC 14.66*  --   --  10.13   HEMOGLOBIN 13.7  --   --  13.1   HEMATOCRIT 42.3  --   --  40.4   PLATELETS 309  --   --  265   NEUTROS ABS  --   --   --  6.67   IMMATURE GRANS (ABS)  --   --   --  0.08*   LYMPHS ABS  --   --   --  2.22   MONOS ABS  --   --   --  0.53   EOS ABS  --   --   --  0.54*   MCV 95.9  --   --  96.7   CRP  --   --   --  1.55*   PROCALCITONIN  --   --   --  0.05   LACTATE  --  1.1  --  2.1*   LDH  --   --   --  183   D DIMER QUANT  --   --  0.31  --          Lab 21  0920 21  2233   SODIUM 137 135*   POTASSIUM 4.4 4.1   CHLORIDE 102 101   CO2 21.0* 23.0   ANION GAP 14.0 11.0   BUN 12 10   CREATININE 0.74 0.85   GLUCOSE 208* 134*   CALCIUM 9.3 9.3   HEMOGLOBIN A1C 5.20  --          Lab 21    TOTAL PROTEIN 7.1   ALBUMIN 4.00   GLOBULIN 3.1   ALT (SGPT) 11   AST (SGOT) 11   BILIRUBIN <0.2   ALK PHOS 83         Lab 09/01/21  2233   PROBNP 36.3   TROPONIN T <0.010                 Lab 09/02/21  0334   PH, ARTERIAL 7.398   PCO2, ARTERIAL 39.0   PO2 ART 65.1*   FIO2 28   HCO3 ART 24.0   BASE EXCESS ART -0.6*   CARBOXYHEMOGLOBIN 3.0*     Brief Urine Lab Results  (Last result in the past 365 days)      Color   Clarity   Blood   Leuk Est   Nitrite   Protein   CREAT   Urine HCG        09/02/21 0321               Negative           Microbiology Results Abnormal     Procedure Component Value - Date/Time    COVID PRE-OP / PRE-PROCEDURE SCREENING ORDER (NO ISOLATION) - Swab, Nasopharynx [[id number]]  (Normal) Collected: 09/02/21 1028    Lab Status: Final result Specimen: Swab from Nasopharynx Updated: 09/02/21 1146    Narrative:      The following orders were created for panel order COVID PRE-OP / PRE-PROCEDURE SCREENING ORDER (NO ISOLATION) - Swab, Nasopharynx.  Procedure                               Abnormality         Status                     ---------                               -----------         ------                     Respiratory Panel PCR w/...[[id number]]  Normal              Final result                 Please view results for these tests on the individual orders.    Respiratory Panel PCR w/COVID-19(SARS-CoV-2) KARIN/HUI/VINOD/PAD/COR/MAD/AMANDA In-House, NP Swab in UTM/VTM, 3-4 HR TAT - Swab, Nasopharynx [[id number]]  (Normal) Collected: 09/02/21 1028    Lab Status: Final result Specimen: Swab from Nasopharynx Updated: 09/02/21 1146     ADENOVIRUS, PCR Not Detected     Coronavirus 229E Not Detected     Coronavirus HKU1 Not Detected     Coronavirus NL63 Not Detected     Coronavirus OC43 Not Detected     COVID19 Not Detected     Human Metapneumovirus Not Detected     Human Rhinovirus/Enterovirus Not Detected     Influenza A PCR Not Detected     Influenza B PCR Not Detected     Parainfluenza Virus 1 Not  Detected     Parainfluenza Virus 2 Not Detected     Parainfluenza Virus 3 Not Detected     Parainfluenza Virus 4 Not Detected     RSV, PCR Not Detected     Bordetella pertussis pcr Not Detected     Bordetella parapertussis PCR Not Detected     Chlamydophila pneumoniae PCR Not Detected     Mycoplasma pneumo by PCR Not Detected    Narrative:      In the setting of a positive respiratory panel with a viral infection PLUS a negative procalcitonin without other underlying concern for bacterial infection, consider observing off antibiotics or discontinuation of antibiotics and continue supportive care. If the respiratory panel is positive for atypical bacterial infection (Bordetella pertussis, Chlamydophila pneumoniae, or Mycoplasma pneumoniae), consider antibiotic de-escalation to target atypical bacterial infection.    COVID PRE-OP / PRE-PROCEDURE SCREENING ORDER (NO ISOLATION) - Swab, Nasopharynx [[id number]]  (Normal) Collected: 09/02/21 0300    Lab Status: Final result Specimen: Swab from Nasopharynx Updated: 09/02/21 0556    Narrative:      The following orders were created for panel order COVID PRE-OP / PRE-PROCEDURE SCREENING ORDER (NO ISOLATION) - Swab, Nasopharynx.  Procedure                               Abnormality         Status                     ---------                               -----------         ------                     Respiratory Panel PCR w/...[[id number]]  Normal              Final result                 Please view results for these tests on the individual orders.    Respiratory Panel PCR w/COVID-19(SARS-CoV-2) KARIN/HUI/VINOD/PAD/COR/MAD/AMANDA In-House, NP Swab in UTM/VTM, 3-4 HR TAT - Swab, Nasopharynx [[id number]]  (Normal) Collected: 09/02/21 0300    Lab Status: Final result Specimen: Swab from Nasopharynx Updated: 09/02/21 0556     ADENOVIRUS, PCR Not Detected     Coronavirus 229E Not Detected     Coronavirus HKU1 Not Detected     Coronavirus NL63 Not Detected     Coronavirus OC43 Not Detected      COVID19 Not Detected     Human Metapneumovirus Not Detected     Human Rhinovirus/Enterovirus Not Detected     Influenza A PCR Not Detected     Influenza B PCR Not Detected     Parainfluenza Virus 1 Not Detected     Parainfluenza Virus 2 Not Detected     Parainfluenza Virus 3 Not Detected     Parainfluenza Virus 4 Not Detected     RSV, PCR Not Detected     Bordetella pertussis pcr Not Detected     Bordetella parapertussis PCR Not Detected     Chlamydophila pneumoniae PCR Not Detected     Mycoplasma pneumo by PCR Not Detected    Narrative:      In the setting of a positive respiratory panel with a viral infection PLUS a negative procalcitonin without other underlying concern for bacterial infection, consider observing off antibiotics or discontinuation of antibiotics and continue supportive care. If the respiratory panel is positive for atypical bacterial infection (Bordetella pertussis, Chlamydophila pneumoniae, or Mycoplasma pneumoniae), consider antibiotic de-escalation to target atypical bacterial infection.    COVID PRE-OP / PRE-PROCEDURE SCREENING ORDER (NO ISOLATION) - Swab, Nasopharynx [[id number]]  (Normal) Collected: 09/02/21 0314    Lab Status: Final result Specimen: Swab from Nasopharynx Updated: 09/02/21 0416    Narrative:      The following orders were created for panel order COVID PRE-OP / PRE-PROCEDURE SCREENING ORDER (NO ISOLATION) - Swab, Nasopharynx.  Procedure                               Abnormality         Status                     ---------                               -----------         ------                     COVID-19 and FLU A/B PCR...[[id number]]  Normal              Final result                 Please view results for these tests on the individual orders.    COVID-19 and FLU A/B PCR - Swab, Nasopharynx [[id number]]  (Normal) Collected: 09/02/21 0314    Lab Status: Final result Specimen: Swab from Nasopharynx Updated: 09/02/21 0416     COVID19 Not Detected     Influenza A PCR Not  Detected     Influenza B PCR Not Detected    Narrative:      Fact sheet for providers: https://www.fda.gov/media/156259/download    Fact sheet for patients: https://www.fda.gov/media/971221/download    Test performed by PCR.          No radiology results from the last 24 hrs        I have reviewed the medications:  Scheduled Meds:Albuterol Sulfate NEB Orderable, 2.5 mg, Nebulization, 4x Daily - RT  dexamethasone, 8 mg, Intravenous, Q12H  enoxaparin, 40 mg, Subcutaneous, Q12H  escitalopram, 10 mg, Oral, Daily  furosemide, 60 mg, Intravenous, Once  guaiFENesin, 600 mg, Oral, Q12H  losartan, 100 mg, Oral, Daily  nicotine, 1 patch, Transdermal, Q24H  sodium chloride, 10 mL, Intravenous, Q12H  traZODone, 150 mg, Oral, Nightly      Continuous Infusions:   PRN Meds:.•  acetaminophen **OR** acetaminophen **OR** acetaminophen  •  calcium carbonate  •  ondansetron **OR** ondansetron  •  sodium chloride  •  sodium chloride    Assessment/Plan   Assessment & Plan     Active Hospital Problems    Diagnosis  POA   • Viral respiratory infection [J98.8, B97.89]  Yes      Resolved Hospital Problems   No resolved problems to display.        Brief Hospital Course to date:  Starr Ruiz is a 41 y.o. female  with history of obesity, tobacco abuse, unvaccinated for Covid who was admitted for 1 week shortness of breath and cough after recent exposure to COVID-19 at work.       Shortness of breath  Bronchospasm  Tobacco abuse  Hypoxia     -CT angiogram of the chest negative for pulmonary embolism or infiltrate  -Initial COVID-19 testing negative for the novel coronavirus or influenza  -Covid negative x2  -Echo to eval for pulmonary hypertension, still pending  -Continue Decadron  -Albuterol MDI scheduled  -Nicotine replacement, counseled smoking cessation  -Fungal culture ordered 9/3, pending     Obesity  -BMI 55, likely contributing to shortness of breath     Hypertension  -HCTZ discontinued 9/3  -Respiratory status much  improved with dose of Lasix on 9/3, repeat again today     Hepatic Steatosis and splenomegaly  -Secondary to morbid obesity      DVT prophylaxis:  Medical DVT prophylaxis orders are present.       AM-PAC 6 Clicks Score (PT): 20 (09/02/21 5181)    Disposition: I expect the patient to be discharged likely tomorrow. Will need to have her ambulate to check oxygen level with activity prior to DC    CODE STATUS:   Code Status and Medical Interventions:   Ordered at: 09/02/21 7648     Level Of Support Discussed With:    Patient     Code Status:    CPR     Medical Interventions (Level of Support Prior to Arrest):    Full       Jenny Pelaez,   09/04/21

## 2021-09-04 NOTE — THERAPY EVALUATION
Patient Name: Starr Ruiz  : 1980    MRN: [id number]                              Today's Date: 2021       Admit Date: 2021    Visit Dx:     ICD-10-CM ICD-9-CM   1. Viral respiratory infection  J98.8 079.99    B97.89    2. Chest tightness  R07.89 786.59   3. Shortness of breath  R06.02 786.05   4. Hypoxia  R09.02 799.02   5. Morbid obesity (CMS/HCC)  E66.01 278.01   6. Tobacco dependence  F17.200 305.1   7. History of asthma  Z87.09 V12.69   8. Anxiety and depression  F41.9 300.00    F32.9 311   9. Encntr for obs for susp expsr to oth biolg agents ruled out  Z03.818 V71.83     Patient Active Problem List   Diagnosis   • Anxiety   • Tobacco abuse   • Morbid obesity due to excess calories (CMS/HCC)   • Need for Tdap vaccination   • Grief reaction   • Perianal fistula   • Compulsive tobacco user syndrome   • BP (high blood pressure)   • Extreme obesity   • Urinary frequency   • Anxiety   • Flushing   • Dizziness   • Breast tenderness in female   • Essential hypertension   • Right upper quadrant abdominal mass   • Annual physical exam   • Cervical cancer screening   • Insomnia   • Encounter for screening for malignant neoplasm of breast   • Viral respiratory infection     Past Medical History:   Diagnosis Date   • Anxiety    • Depression    • Hypertension      Past Surgical History:   Procedure Laterality Date   • D & C HYSTEROSCOPY WITH NOVASURE ENDOMETRIAL ABLATION AND MYOSURE     • LEEP     • TOOTH EXTRACTION     • TUBAL ABDOMINAL LIGATION       General Information     Row Name 21 0858          Physical Therapy Time and Intention    Document Type  evaluation  -KG     Mode of Treatment  physical therapy  -KG     Row Name 21 0858          General Information    Patient Profile Reviewed  yes  -KG     Prior Level of Function  independent:  -KG     Existing Precautions/Restrictions  oxygen therapy device and L/min  -KG     Barriers to Rehab  none identified  -KG     Row Name  09/04/21 0858          Living Environment    Lives With  child(sunni), dependent  -KG     Row Name 09/04/21 0858          Home Main Entrance    Number of Stairs, Main Entrance  none  -KG     Row Name 09/04/21 0858          Stairs Within Home, Primary    Number of Stairs, Within Home, Primary  none  -KG     Row Name 09/04/21 0858          Cognition    Orientation Status (Cognition)  oriented x 4  -KG     Row Name 09/04/21 0858          Safety Issues, Functional Mobility    Impairments Affecting Function (Mobility)  shortness of breath  -KG       User Key  (r) = Recorded By, (t) = Taken By, (c) = Cosigned By    Initials Name Provider Type    Linda Michaud Physical Therapist        Mobility     Row Name 09/04/21 0859          Bed Mobility    Bed Mobility  supine-sit;sit-supine;scooting/bridging  -KG     Scooting/Bridging Harrison (Bed Mobility)  independent  -KG     Supine-Sit [place] (Bed Mobility)  independent  -KG     Sit-Supine [place] (Bed Mobility)  independent  -KG     Comment (Bed Mobility)  indep with achieving EOB  -KG     Row Name 09/04/21 0859          Transfers    Comment (Transfers)  cues to decrease speed to improve safety  -KG     Row Name 09/04/21 0859          Sit-Stand Transfer    Sit-Stand Harrison (Transfers)  independent  -KG     Row Name 09/04/21 0859          Gait/Stairs (Locomotion)    Harrison Level (Gait)  standby assist  -KG     Assistive Device (Gait)  other (see comments) no AD  -KG     Deviations/Abnormal Patterns (Gait)  gait speed decreased  -KG     Comment (Gait/Stairs)  Pt able to ambulate 60', SBA on 4LO2.  Pt with slow, cautious gait, 1 episode of LOB with turning but patient was able to indep recover stability.  Pt desats into upper 80s on 2L O2, thus required 4LO2 to maintain SpO2 in the 90s with mobility.  -KG       User Key  (r) = Recorded By, (t) = Taken By, (c) = Cosigned By    Initials Name Provider Type    Linda Michaud Physical Therapist         Obj/Interventions     Row Name 09/04/21 0906          Strength Comprehensive (MMT)    General Manual Muscle Testing (MMT) Assessment  no strength deficits identified  -KG     Row Name 09/04/21 0906          Balance    Dynamic Sitting Balance  WNL;sitting, edge of bed  -KG     Dynamic Standing Balance  WFL;unsupported;standing  -KG     Balance Interventions  standing;sit to stand;dynamic  -KG       User Key  (r) = Recorded By, (t) = Taken By, (c) = Cosigned By    Initials Name Provider Type    Linda Michaud Physical Therapist        Goals/Plan     Row Name 09/04/21 0919          Transfer Goal 1 (PT)    Activity/Assistive Device (Transfer Goal 1, PT)  sit-to-stand/stand-to-sit;bed-to-chair/chair-to-bed;toilet  -KG     Fontana Level/Cues Needed (Transfer Goal 1, PT)  standby assist  -KG     Time Frame (Transfer Goal 1, PT)  short term goal (STG);3 days  -KG     Progress/Outcome (Transfer Goal 1, PT)  continuing progress toward goal  -KG     Row Name 09/04/21 0919          Gait Training Goal 1 (PT)    Activity/Assistive Device (Gait Training Goal 1, PT)  gait (walking locomotion);cane, straight  -KG     Fontana Level (Gait Training Goal 1, PT)  independent  -KG     Distance (Gait Training Goal 1, PT)  100  -KG     Time Frame (Gait Training Goal 1, PT)  long term goal (LTG);10 days  -KG     Progress/Outcome (Gait Training Goal 1, PT)  continuing progress toward goal  -KG       User Key  (r) = Recorded By, (t) = Taken By, (c) = Cosigned By    Initials Name Provider Type    Linda Michaud Physical Therapist        Clinical Impression     Row Name 09/04/21 0906          Pain Scale: Numbers Pre/Post-Treatment    Pretreatment Pain Rating  0/10 - no pain  -KG     Posttreatment Pain Rating  0/10 - no pain  -KG     Row Name 09/04/21 0906          Plan of Care Review    Plan of Care Reviewed With  patient  -KG     Progress  no change  -KG     Outcome Summary  PT eval performed: Pt presenting with SOA  and decline in functional mobility.  Indep with bed mobility.  Pt able to ambulate 60', SBA on 4LO2.  Pt with slow, cautious gait, 1 episode of LOB with turning but patient was able to indep recover stability.  Pt desats into upper 80s on 2L O2, thus required 4LO2 to maintain SpO2 in the 90s with mobility.  Pt may need O2 at d/c pending progress.  Pt may also benefit from AD training with [facility] for energy conservation/ assist with SOA, plan for at least 1 more visit IPPT and d/c home with assist.  -KG     Row Name 09/04/21 0906          Therapy Assessment/Plan (PT)    Rehab Potential (PT)  good, to achieve stated therapy goals  -KG     Criteria for Skilled Interventions Met (PT)  yes;meets criteria;skilled treatment is necessary  -KG     Row Name 09/04/21 0906          Vital Signs    Pre Systolic BP Rehab  147  -KG     Pre Treatment Diastolic BP  90  -KG     Pre SpO2 (%)  91  -KG     O2 Delivery Pre Treatment  supplemental O2  -KG     Intra SpO2 (%)  87  -KG     O2 Delivery Intra Treatment  supplemental O2  -KG     Post SpO2 (%)  93  -KG     O2 Delivery Post Treatment  supplemental O2  -KG     Row Name 09/04/21 0906          Positioning and Restraints    Pre-Treatment Position  in bed  -KG     Post Treatment Position  bed  -KG     In Bed  with nsg;call light within reach;encouraged to call for assist;with other staff  -KG       User Key  (r) = Recorded By, (t) = Taken By, (c) = Cosigned By    Initials Name Provider Type    KG Linda Rae Physical Therapist        Outcome Measures     Row Name 09/04/21 0922          How much help from another person do you currently need...    Turning from your back to your side while in flat bed without using bedrails?  4  -KG     Moving from lying on back to sitting on the side of a flat bed without bedrails?  4  -KG     Moving to and from a bed to a chair (including a wheelchair)?  3  -KG     Standing up from a chair using your arms (e.g., wheelchair, bedside chair)?  3  -KG      Climbing 3-5 steps with a railing?  3  -KG     To walk in hospital room?  3  -KG     AM-PAC 6 Clicks Score (PT)  20  -KG     Row Name 09/04/21 0922 09/04/21 0754       Functional Assessment    Outcome Measure Options  AM-PAC 6 Clicks Basic Mobility (PT)  -KG  AM-PAC 6 Clicks Daily Activity (OT)  -TA      User Key  (r) = Recorded By, (t) = Taken By, (c) = Cosigned By    Initials Name Provider Type    TA Donald Crawford OT Occupational Therapist    Linda Michaud Physical Therapist                       Physical Therapy Education                 Title: PT OT SLP Therapies (Done)     Topic: Physical Therapy (Done)     Point: Mobility training (Done)     Learning Progress Summary           Patient Acceptance, E,TB, VU by KG at 9/4/2021 0923                   Point: Body mechanics (Done)     Learning Progress Summary           Patient Acceptance, E,TB, VU by KG at 9/4/2021 0923                   Point: Precautions (Done)     Learning Progress Summary           Patient Acceptance, E,TB, VU by KG at 9/4/2021 0923                               User Key     Initials Effective Dates Name Provider Type Discipline    KG 06/16/21 -  Linda Rae Physical Therapist PT              PT Recommendation and Plan     Plan of Care Reviewed With: patient  Progress: no change  Outcome Summary: PT eval performed: Pt presenting with SOA and decline in functional mobility.  Indep with bed mobility.  Pt able to ambulate 60', SBA on 4LO2.  Pt with slow, cautious gait, 1 episode of LOB with turning but patient was able to indep recover stability.  Pt desats into upper 80s on 2L O2, thus required 4LO2 to maintain SpO2 in the 90s with mobility.  Pt may need O2 at d/c pending progress.  Pt may also benefit from AD training with [facility] for energy conservation/ assist with SOA, plan for at least 1 more visit IPPT and d/c home with assist.     Time Calculation:   PT Charges     Row Name 09/04/21 0923             Time Calculation     Start Time  0830  -KG      PT Received On  09/04/21  -KG      PT Goal Re-Cert Due Date  09/14/21  -KG         Untimed Charges    PT Eval/Re-eval Minutes  55  -KG         Total Minutes    Untimed Charges Total Minutes  55  -KG       Total Minutes  55  -KG        User Key  (r) = Recorded By, (t) = Taken By, (c) = Cosigned By    Initials Name Provider Type    Linda Michaud Physical Therapist        Therapy Charges for Today     Code Description Service Date Service Provider Modifiers Qty    [id number] HC PT EVAL LOW COMPLEXITY 4 9/4/2021 Linda Rae GP 1          PT G-Codes  Outcome Measure Options: AM-PAC 6 Clicks Basic Mobility (PT)  AM-PAC 6 Clicks Score (PT): 20  AM-PAC 6 Clicks Score (OT): 24    Linda Rae  9/4/2021

## 2021-09-05 ENCOUNTER — READMISSION MANAGEMENT (OUTPATIENT)
Dept: CALL CENTER | Facility: HOSPITAL | Age: 41
End: 2021-09-05

## 2021-09-05 VITALS
DIASTOLIC BLOOD PRESSURE: 86 MMHG | RESPIRATION RATE: 18 BRPM | SYSTOLIC BLOOD PRESSURE: 144 MMHG | TEMPERATURE: 98.3 F | HEART RATE: 68 BPM | WEIGHT: 293 LBS | HEIGHT: 69 IN | OXYGEN SATURATION: 94 % | BODY MASS INDEX: 43.4 KG/M2

## 2021-09-05 LAB
ALBUMIN SERPL-MCNC: 3.7 G/DL (ref 3.5–5.2)
ANION GAP SERPL CALCULATED.3IONS-SCNC: 9 MMOL/L (ref 5–15)
BASOPHILS # BLD AUTO: 0.04 10*3/MM3 (ref 0–0.2)
BASOPHILS NFR BLD AUTO: 0.3 % (ref 0–1.5)
BUN SERPL-MCNC: 22 MG/DL (ref 6–20)
BUN/CREAT SERPL: 27.2 (ref 7–25)
CALCIUM SPEC-SCNC: 9.1 MG/DL (ref 8.6–10.5)
CHLORIDE SERPL-SCNC: 103 MMOL/L (ref 98–107)
CO2 SERPL-SCNC: 24 MMOL/L (ref 22–29)
CREAT SERPL-MCNC: 0.81 MG/DL (ref 0.57–1)
DEPRECATED RDW RBC AUTO: 52.2 FL (ref 37–54)
EOSINOPHIL # BLD AUTO: 0 10*3/MM3 (ref 0–0.4)
EOSINOPHIL NFR BLD AUTO: 0 % (ref 0.3–6.2)
ERYTHROCYTE [DISTWIDTH] IN BLOOD BY AUTOMATED COUNT: 13.9 % (ref 12.3–15.4)
GFR SERPL CREATININE-BSD FRML MDRD: 78 ML/MIN/1.73
GLUCOSE SERPL-MCNC: 155 MG/DL (ref 65–99)
HCT VFR BLD AUTO: 42.4 % (ref 34–46.6)
HGB BLD-MCNC: 13.3 G/DL (ref 12–15.9)
IMM GRANULOCYTES # BLD AUTO: 0.21 10*3/MM3 (ref 0–0.05)
IMM GRANULOCYTES NFR BLD AUTO: 1.6 % (ref 0–0.5)
LYMPHOCYTES # BLD AUTO: 1.51 10*3/MM3 (ref 0.7–3.1)
LYMPHOCYTES NFR BLD AUTO: 11.5 % (ref 19.6–45.3)
MCH RBC QN AUTO: 31.9 PG (ref 26.6–33)
MCHC RBC AUTO-ENTMCNC: 31.4 G/DL (ref 31.5–35.7)
MCV RBC AUTO: 101.7 FL (ref 79–97)
MONOCYTES # BLD AUTO: 0.58 10*3/MM3 (ref 0.1–0.9)
MONOCYTES NFR BLD AUTO: 4.4 % (ref 5–12)
NEUTROPHILS NFR BLD AUTO: 10.74 10*3/MM3 (ref 1.7–7)
NEUTROPHILS NFR BLD AUTO: 82.2 % (ref 42.7–76)
NRBC BLD AUTO-RTO: 0 /100 WBC (ref 0–0.2)
PHOSPHATE SERPL-MCNC: 3.4 MG/DL (ref 2.5–4.5)
PLATELET # BLD AUTO: 113 10*3/MM3 (ref 140–450)
PMV BLD AUTO: 9.8 FL (ref 6–12)
POTASSIUM SERPL-SCNC: 4.6 MMOL/L (ref 3.5–5.2)
RBC # BLD AUTO: 4.17 10*6/MM3 (ref 3.77–5.28)
SODIUM SERPL-SCNC: 136 MMOL/L (ref 136–145)
WBC # BLD AUTO: 13.08 10*3/MM3 (ref 3.4–10.8)

## 2021-09-05 PROCEDURE — 85025 COMPLETE CBC W/AUTO DIFF WBC: CPT | Performed by: INTERNAL MEDICINE

## 2021-09-05 PROCEDURE — 25010000002 DEXAMETHASONE PER 1 MG: Performed by: INTERNAL MEDICINE

## 2021-09-05 PROCEDURE — 80069 RENAL FUNCTION PANEL: CPT | Performed by: INTERNAL MEDICINE

## 2021-09-05 PROCEDURE — 99239 HOSP IP/OBS DSCHRG MGMT >30: CPT | Performed by: INTERNAL MEDICINE

## 2021-09-05 PROCEDURE — 94799 UNLISTED PULMONARY SVC/PX: CPT

## 2021-09-05 PROCEDURE — 25010000002 ENOXAPARIN PER 10 MG

## 2021-09-05 RX ORDER — NICOTINE 21 MG/24HR
1 PATCH, TRANSDERMAL 24 HOURS TRANSDERMAL EVERY 24 HOURS
Qty: 30 PATCH | Refills: 0 | Status: SHIPPED | OUTPATIENT
Start: 2021-09-05 | End: 2022-05-17

## 2021-09-05 RX ORDER — FUROSEMIDE 40 MG/1
40 TABLET ORAL AS NEEDED
Qty: 15 TABLET | Refills: 0 | Status: SHIPPED | OUTPATIENT
Start: 2021-09-05 | End: 2022-05-17

## 2021-09-05 RX ADMIN — ENOXAPARIN SODIUM 40 MG: 40 INJECTION SUBCUTANEOUS at 08:03

## 2021-09-05 RX ADMIN — LOSARTAN POTASSIUM 100 MG: 50 TABLET, FILM COATED ORAL at 08:03

## 2021-09-05 RX ADMIN — ALBUTEROL SULFATE 2.5 MG: 2.5 SOLUTION RESPIRATORY (INHALATION) at 10:06

## 2021-09-05 RX ADMIN — SODIUM CHLORIDE, PRESERVATIVE FREE 10 ML: 5 INJECTION INTRAVENOUS at 08:04

## 2021-09-05 RX ADMIN — ESCITALOPRAM OXALATE 10 MG: 10 TABLET ORAL at 08:03

## 2021-09-05 RX ADMIN — DEXAMETHASONE SODIUM PHOSPHATE 8 MG: 4 INJECTION, SOLUTION INTRA-ARTICULAR; INTRALESIONAL; INTRAMUSCULAR; INTRAVENOUS; SOFT TISSUE at 05:09

## 2021-09-05 RX ADMIN — GUAIFENESIN 600 MG: 600 TABLET, EXTENDED RELEASE ORAL at 08:03

## 2021-09-05 NOTE — OUTREACH NOTE
Prep Survey      Responses   Gnosticist facility patient discharged from?  [facility]   Is LACE score < 7 ?  Yes   Emergency Room discharge w/ pulse ox?  No   Eligibility  [facility]   Date of Admission  09/02/21   Date of Discharge  09/05/21   Discharge Disposition  Home or Self Care   Discharge diagnosis  Hypoxia, SOA, Bronchospasm, tobacco abuse   Does the patient have one of the following disease processes/diagnoses(primary or secondary)?  Other   Does the patient have Home health ordered?  No   Is there a DME ordered?  No   Prep survey completed?  Yes          Joyce Sidhu RN

## 2021-09-05 NOTE — DISCHARGE SUMMARY
[facility] Medicine Services  DISCHARGE SUMMARY    Patient Name: Starr Ruiz  : 1980  MRN: [id number]    Date of Admission: 2021 12:48 AM  Date of Discharge: 2021  Primary Care Physician: Nahed Molina MD    Consults     No orders found from 2021 to 9/3/2021.          Hospital Course     Presenting Problem:   Viral respiratory infection [J98.8, B97.89]    Active Hospital Problems    Diagnosis  POA   • Viral respiratory infection [J98.8, B97.89]  Yes      Resolved Hospital Problems   No resolved problems to display.          Hospital Course:  Starr Ruiz is a 41 y.o. female with history of obesity, tobacco abuse, unvaccinated for Covid who was admitted for 1 week shortness of breath and cough after recent exposure to COVID-19 at work.    She also admitted to finding mold in her new house and had significant exposure while trying to clean it out.  Fungal cultures were ordered, but still pending.  Her respiratory status much improved with diuretic therapy.  She is still requiring nasal cannula at night as well as with significant activity due to morbid obesity and likely undiagnosed CARSON     Shortness of breath  Bronchospasm  Tobacco abuse  Hypoxia     -CT angiogram of the chest negative for pulmonary embolism or infiltrate  -Covid negative x2, including viral respiratory panel    -Echo reviewed, EF within normal limit.  No RVSP documented  -DC with as needed Lasix  -Nicotine patch is also given  -Fungal cultures pending, follow-up with PCP regarding results.     Obesity  -BMI 55, likely contributing to shortness of breath  -Discussed with her weight loss and exercise     Hypertension  -Continue home meds    Depression/anxiety  -Continue home meds     Hepatic Steatosis and splenomegaly  -Secondary to morbid obesity    Discharge Follow Up Recommendations for outpatient labs/diagnostics:  Follow-up with PCP next week, would recommend outpatient  sleep study    Day of Discharge     HPI:   Feeling better, requiring oxygen at night.  Discussed that there needs to follow-up for sleep study as she likely has undiagnosed CARSON secondary to her body habitus.    Review of Systems  Gen- No fevers, chills  CV- No chest pain, palpitations  Resp- No cough, + dyspnea with activity  GI- No N/V/D, abd pain        Vital Signs:   Temp:  [98.1 °F (36.7 °C)-98.8 °F (37.1 °C)] 98.3 °F (36.8 °C)  Heart Rate:  [58-95] 58  Resp:  [16-18] 18  BP: (120-144)/(62-86) 144/86     Physical Exam:  Constitutional: No acute distress, awake, alert  HENT: NCAT, mucous membranes moist  Respiratory: Clear to auscultation bilaterally, respiratory effort normal   Cardiovascular: RRR, no murmurs, rubs, or gallops  Gastrointestinal: Positive bowel sounds, soft, nontender, obese  Musculoskeletal: Trace bilateral LE edema  Psychiatric: Appropriate affect, cooperative  Neurologic: Oriented x 3, speech clear  Skin: No rashes      Pertinent  and/or Most Recent Results     LAB RESULTS:      Lab 09/05/21  0649 09/04/21  0838 09/03/21  1056 09/02/21  0436 09/02/21  0314 09/01/21  2233   WBC 13.08* 13.61* 14.66*  --   --  10.13   HEMOGLOBIN 13.3 13.1 13.7  --   --  13.1   HEMATOCRIT 42.4 39.9 42.3  --   --  40.4   PLATELETS 113* 294 309  --   --  265   NEUTROS ABS 10.74* 12.10*  --   --   --  6.67   IMMATURE GRANS (ABS) 0.21* 0.20*  --   --   --  0.08*   LYMPHS ABS 1.51 0.97  --   --   --  2.22   MONOS ABS 0.58 0.32  --   --   --  0.53   EOS ABS 0.00 0.00  --   --   --  0.54*   .7* 95.7 95.9  --   --  96.7   CRP  --   --   --   --   --  1.55*   PROCALCITONIN  --   --   --   --   --  0.05   LACTATE  --   --   --  1.1  --  2.1*   LDH  --   --   --   --   --  183   D DIMER QUANT  --   --   --   --  0.31  --          Lab 09/04/21  0838 09/03/21 0920 09/01/21 2231   SODIUM 139 137 135*   POTASSIUM 4.3 4.4 4.1   CHLORIDE 103 102 101   CO2 24.0 21.0* 23.0   ANION GAP 12.0 14.0 11.0   BUN 18 12 10    CREATININE 0.82 0.74 0.85   GLUCOSE 171* 208* 134*   CALCIUM 9.2 9.3 9.3   PHOSPHORUS 2.7  --   --    HEMOGLOBIN A1C  --  5.20  --          Lab 09/04/21  0838 09/01/21  2233   TOTAL PROTEIN  --  7.1   ALBUMIN 3.90 4.00   GLOBULIN  --  3.1   ALT (SGPT)  --  11   AST (SGOT)  --  11   BILIRUBIN  --  <0.2   ALK PHOS  --  83         Lab 09/01/21  2233   PROBNP 36.3   TROPONIN T <0.010                 Lab 09/02/21  0334   PH, ARTERIAL 7.398   PCO2, ARTERIAL 39.0   PO2 ART 65.1*   FIO2 28   HCO3 ART 24.0   BASE EXCESS ART -0.6*   CARBOXYHEMOGLOBIN 3.0*     Brief Urine Lab Results  (Last result in the past 365 days)      Color   Clarity   Blood   Leuk Est   Nitrite   Protein   CREAT   Urine HCG        09/02/21 0321               Negative         Microbiology Results (last 10 days)     Procedure Component Value - Date/Time    COVID PRE-OP / PRE-PROCEDURE SCREENING ORDER (NO ISOLATION) - Swab, Nasopharynx [[id number]]  (Normal) Collected: 09/02/21 1028    Lab Status: Final result Specimen: Swab from Nasopharynx Updated: 09/02/21 1146    Narrative:      The following orders were created for panel order COVID PRE-OP / PRE-PROCEDURE SCREENING ORDER (NO ISOLATION) - Swab, Nasopharynx.  Procedure                               Abnormality         Status                     ---------                               -----------         ------                     Respiratory Panel PCR w/...[[id number]]  Normal              Final result                 Please view results for these tests on the individual orders.    Respiratory Panel PCR w/COVID-19(SARS-CoV-2) KARIN/HUI/VINOD/PAD/COR/MAD/AMANDA In-House, NP Swab in UTM/VTM, 3-4 HR TAT - Swab, Nasopharynx [[id number]]  (Normal) Collected: 09/02/21 1028    Lab Status: Final result Specimen: Swab from Nasopharynx Updated: 09/02/21 1146     ADENOVIRUS, PCR Not Detected     Coronavirus 229E Not Detected     Coronavirus HKU1 Not Detected     Coronavirus NL63 Not Detected     Coronavirus OC43 Not  Detected     COVID19 Not Detected     Human Metapneumovirus Not Detected     Human Rhinovirus/Enterovirus Not Detected     Influenza A PCR Not Detected     Influenza B PCR Not Detected     Parainfluenza Virus 1 Not Detected     Parainfluenza Virus 2 Not Detected     Parainfluenza Virus 3 Not Detected     Parainfluenza Virus 4 Not Detected     RSV, PCR Not Detected     Bordetella pertussis pcr Not Detected     Bordetella parapertussis PCR Not Detected     Chlamydophila pneumoniae PCR Not Detected     Mycoplasma pneumo by PCR Not Detected    Narrative:      In the setting of a positive respiratory panel with a viral infection PLUS a negative procalcitonin without other underlying concern for bacterial infection, consider observing off antibiotics or discontinuation of antibiotics and continue supportive care. If the respiratory panel is positive for atypical bacterial infection (Bordetella pertussis, Chlamydophila pneumoniae, or Mycoplasma pneumoniae), consider antibiotic de-escalation to target atypical bacterial infection.    COVID PRE-OP / PRE-PROCEDURE SCREENING ORDER (NO ISOLATION) - Swab, Nasopharynx [[id number]]  (Normal) Collected: 09/02/21 0314    Lab Status: Final result Specimen: Swab from Nasopharynx Updated: 09/02/21 0416    Narrative:      The following orders were created for panel order COVID PRE-OP / PRE-PROCEDURE SCREENING ORDER (NO ISOLATION) - Swab, Nasopharynx.  Procedure                               Abnormality         Status                     ---------                               -----------         ------                     COVID-19 and FLU A/B PCR...[[id number]]  Normal              Final result                 Please view results for these tests on the individual orders.    COVID-19 and FLU A/B PCR - Swab, Nasopharynx [[id number]]  (Normal) Collected: 09/02/21 0314    Lab Status: Final result Specimen: Swab from Nasopharynx Updated: 09/02/21 0416     COVID19 Not Detected     Influenza A  PCR Not Detected     Influenza B PCR Not Detected    Narrative:      Fact sheet for providers: https://www.fda.gov/media/607028/download    Fact sheet for patients: https://www.fda.gov/media/759368/download    Test performed by PCR.    COVID PRE-OP / PRE-PROCEDURE SCREENING ORDER (NO ISOLATION) - Swab, Nasopharynx [[id number]]  (Normal) Collected: 09/02/21 0300    Lab Status: Final result Specimen: Swab from Nasopharynx Updated: 09/02/21 0556    Narrative:      The following orders were created for panel order COVID PRE-OP / PRE-PROCEDURE SCREENING ORDER (NO ISOLATION) - Swab, Nasopharynx.  Procedure                               Abnormality         Status                     ---------                               -----------         ------                     Respiratory Panel PCR w/...[[id number]]  Normal              Final result                 Please view results for these tests on the individual orders.    Respiratory Panel PCR w/COVID-19(SARS-CoV-2) KARIN/HUI/VINOD/PAD/COR/MAD/AMANDA In-House, NP Swab in UTM/VTM, 3-4 HR TAT - Swab, Nasopharynx [[id number]]  (Normal) Collected: 09/02/21 0300    Lab Status: Final result Specimen: Swab from Nasopharynx Updated: 09/02/21 0556     ADENOVIRUS, PCR Not Detected     Coronavirus 229E Not Detected     Coronavirus HKU1 Not Detected     Coronavirus NL63 Not Detected     Coronavirus OC43 Not Detected     COVID19 Not Detected     Human Metapneumovirus Not Detected     Human Rhinovirus/Enterovirus Not Detected     Influenza A PCR Not Detected     Influenza B PCR Not Detected     Parainfluenza Virus 1 Not Detected     Parainfluenza Virus 2 Not Detected     Parainfluenza Virus 3 Not Detected     Parainfluenza Virus 4 Not Detected     RSV, PCR Not Detected     Bordetella pertussis pcr Not Detected     Bordetella parapertussis PCR Not Detected     Chlamydophila pneumoniae PCR Not Detected     Mycoplasma pneumo by PCR Not Detected    Narrative:      In the setting of a positive  respiratory panel with a viral infection PLUS a negative procalcitonin without other underlying concern for bacterial infection, consider observing off antibiotics or discontinuation of antibiotics and continue supportive care. If the respiratory panel is positive for atypical bacterial infection (Bordetella pertussis, Chlamydophila pneumoniae, or Mycoplasma pneumoniae), consider antibiotic de-escalation to target atypical bacterial infection.          Adult Transthoracic Echo Complete W/ Cont if Necessary Per Protocol    Result Date: 9/4/2021  · Estimated left ventricular EF = 60% Left ventricular ejection fraction appears to be 56 - 60%. Left ventricular systolic function is normal. · Left ventricular diastolic function was normal.      XR Chest 1 View    Result Date: 9/2/2021  CR Chest 1 Vw INDICATION: Short of air. Tobacco abuse and hypertension. COMPARISON:  None available. FINDINGS: Single portable AP view(s) of the chest.  Portions of both lung apices excluded from view. Cardiac silhouette is within normal limits. The vascularity is unremarkable and the lungs are clear. No pneumothorax.     1. Negative chest. Signer Name: Dex Harmon MD  Signed: 9/2/2021 1:19 AM  Workstation Name: [employer]-  [facility]    CT Angiogram Chest    Result Date: 9/2/2021  CTA Chest INDICATION: Chest pain short of air. Flulike symptoms and hypoxia. COVID- 19 suspected. TECHNIQUE: CT angiogram of the chest with IV contrast. 3-D reconstructions were obtained and reviewed.   Radiation dose reduction techniques included automated exposure control or exposure modulation based on body size. Count of known CT and cardiac nuc med studies performed in previous 12 months: 0. COMPARISON: None available. FINDINGS: Study degraded by poor bolus timing and motion degradation. Images were repeated. Best study possible. The lungs are clear. There is no effusion. No pneumothorax. There is no pericardial effusion. There  is no threshold adenopathy. The included abdomen demonstrates splenomegaly and probable hepatic steatosis. The aorta maintains normal caliber. There is no dissection. Suboptimal bolus timing for evaluation of the pulmonary arteries. Within study limitations, no large filling defect in the main left or right central pulmonary arteries. The more distal pulmonary arteries are suboptimally opacified or assessed there is no suspicious bone lesion.  negative.     1. Motion degradation and suboptimal bolus timing. 2. No aortic aneurysm or dissection. 3. No large filling defect in the main left or right central pulmonary arteries. 4. Splenomegaly and hepatic steatosis. Signer Name: Dex Harmon MD  Signed: 9/2/2021 4:24 AM  Workstation Name: [facility]  [facility]              Results for orders placed during the hospital encounter of 09/02/21    Adult Transthoracic Echo Complete W/ Cont if Necessary Per Protocol    Interpretation Summary  · Estimated left ventricular EF = 60% Left ventricular ejection fraction appears to be 56 - 60%. Left ventricular systolic function is normal.  · Left ventricular diastolic function was normal.      Plan for Follow-up of Pending Labs/Results:   Pending Labs     Order Current Status    Fungus Culture - Sputum, Cough In process    Fungus Smear - Sputum, Cough In process    Renal Function Panel In process        Discharge Details        Discharge Medications      New Medications      Instructions Start Date   furosemide 40 MG tablet  Commonly known as: Lasix   40 mg, Oral, As Needed      nicotine 21 MG/24HR patch  Commonly known as: NICODERM CQ   1 patch, Transdermal, Every 24 Hours         Continue These Medications      Instructions Start Date   albuterol sulfate  (90 Base) MCG/ACT inhaler  Commonly known as: PROVENTIL HFA;VENTOLIN HFA;PROAIR HFA   2 puffs, Inhalation, Every 4 Hours PRN      escitalopram 10 MG tablet  Commonly known as: LEXAPRO   10  mg, Oral, Daily      hydroCHLOROthiazide 12.5 MG tablet  Commonly known as: HYDRODIURIL   12.5 mg, Oral, Daily      losartan 100 MG tablet  Commonly known as: COZAAR   100 mg, Oral, Daily      traZODone 50 MG tablet  Commonly known as: DESYREL   TAKE THREE TABLETS BY MOUTH ONCE NIGHTLY         Stop These Medications    sulfamethoxazole-trimethoprim 800-160 MG per tablet  Commonly known as: BACTRIM DS,SEPTRA DS            Allergies   Allergen Reactions   • Lisinopril          Discharge Disposition:  Home or Self Care    Diet:  Hospital:  Diet Order   Procedures   • Diet Regular       Activity:      Restrictions or Other Recommendations:  Stop smoking       CODE STATUS:    Code Status and Medical Interventions:   Ordered at: 09/02/21 0929     Level Of Support Discussed With:    Patient     Code Status:    CPR     Medical Interventions (Level of Support Prior to Arrest):    Full       Future Appointments   Date Time Provider Department Center   9/15/2021  9:30 AM [facility] ADDED VIEWS  [facility]   9/21/2021  3:30 PM Sarah Valentin APRN [facility]       Additional Instructions for the Follow-ups that You Need to Schedule     Discharge Follow-up with PCP   As directed       Currently Documented PCP:    Nahed Molina MD    PCP Phone Number:    [phone]     Follow Up Details: next week, to followup with oxygen status                     Jenny Pelaez DO  09/05/21      Time Spent on Discharge:  I spent  36 minutes on this discharge activity which included: face-to-face encounter with the patient, reviewing the data in the system, coordination of the care with the nursing staff as well as consultants, documentation, and entering orders.

## 2021-09-05 NOTE — PLAN OF CARE
Goal Outcome Evaluation:   Patient is awake and oriented, lung sounds are diminished, patient o2 saturation 94 % on room air.

## 2021-09-06 ENCOUNTER — TRANSITIONAL CARE MANAGEMENT TELEPHONE ENCOUNTER (OUTPATIENT)
Dept: CALL CENTER | Facility: HOSPITAL | Age: 41
End: 2021-09-06

## 2021-09-06 NOTE — OUTREACH NOTE
Call Center TCM Note      Responses   [facility] patient discharged from?  [facility]   Does the patient have one of the following disease processes/diagnoses(primary or secondary)?  Other   TCM attempt successful?  Yes   Call start time  1403   Call end time  1412   Discharge diagnosis  Hypoxia, SOA, Bronchospasm, tobacco abuse   Person spoke with today (if not patient) and relationship  patient   Meds reviewed with patient/caregiver?  Yes   Is the patient having any side effects they believe may be caused by any medication additions or changes?  No   Does the patient have all medications ordered at discharge?  Yes   Is the patient taking all medications as directed (includes completed medication regime)?  Yes   Does the patient have a primary care provider?   Yes   Does the patient have an appointment with their PCP within 7 days of discharge?  Yes   Comments regarding PCP  hospital [place] [facility] appt on 9/7/21 at 8:00 AM   Has the patient kept scheduled appointments due by today?  N/A   Psychosocial issues?  No   Did the patient receive a copy of their discharge instructions?  Yes   Nursing interventions  Reviewed instructions with patient   What is the patient's perception of their health status since discharge?  Same   Is the patient/caregiver able to teach back signs and symptoms related to disease process for when to call PCP?  Yes   Is the patient/caregiver able to teach back signs and symptoms related to disease process for when to call 911?  Yes   Is the patient/caregiver able to teach back the hierarchy of who to call/visit for symptoms/problems? PCP, Specialist, Home health nurse, Urgent Care, ED, 911  Yes   If the patient is a current smoker, are they able to teach back resources for cessation?  Not a smoker   TCM call completed?  Yes   Wrap up additional comments  Pt states she does not feel well,  feels about the same. Pt says she has lost her taste now, and is worried she may have Covid. Pt tested  negative in hospital. Pt has a PCP hospital dc fu appt on 9/7/21. Questions/concerns addressed.          Katarzyna Lloyd RN    9/6/2021, 14:14 EDT

## 2021-09-07 ENCOUNTER — OFFICE VISIT (OUTPATIENT)
Dept: FAMILY MEDICINE CLINIC | Facility: CLINIC | Age: 41
End: 2021-09-07

## 2021-09-07 VITALS
SYSTOLIC BLOOD PRESSURE: 118 MMHG | RESPIRATION RATE: 16 BRPM | BODY MASS INDEX: 43.4 KG/M2 | OXYGEN SATURATION: 98 % | WEIGHT: 293 LBS | TEMPERATURE: 97.5 F | HEIGHT: 69 IN | DIASTOLIC BLOOD PRESSURE: 74 MMHG | HEART RATE: 103 BPM

## 2021-09-07 DIAGNOSIS — B34.9 VIRAL ILLNESS: ICD-10-CM

## 2021-09-07 DIAGNOSIS — R06.02 SOB (SHORTNESS OF BREATH): Primary | ICD-10-CM

## 2021-09-07 LAB
EXPIRATION DATE: NORMAL
FLUAV AG NPH QL: NEGATIVE
FLUBV AG NPH QL: NEGATIVE
GIE STN SPEC: NORMAL
INTERNAL CONTROL: NORMAL
Lab: 2780

## 2021-09-07 PROCEDURE — 99214 OFFICE O/P EST MOD 30 MIN: CPT | Performed by: FAMILY MEDICINE

## 2021-09-07 PROCEDURE — 87804 INFLUENZA ASSAY W/OPTIC: CPT | Performed by: FAMILY MEDICINE

## 2021-09-07 PROCEDURE — U0004 COV-19 TEST NON-CDC HGH THRU: HCPCS | Performed by: FAMILY MEDICINE

## 2021-09-07 RX ORDER — PREDNISONE 20 MG/1
40 TABLET ORAL DAILY
Qty: 10 TABLET | Refills: 0 | Status: SHIPPED | OUTPATIENT
Start: 2021-09-07 | End: 2022-05-17

## 2021-09-07 RX ORDER — AMOXICILLIN AND CLAVULANATE POTASSIUM 875; 125 MG/1; MG/1
1 TABLET, FILM COATED ORAL 2 TIMES DAILY
Qty: 14 TABLET | Refills: 0 | Status: SHIPPED | OUTPATIENT
Start: 2021-09-07 | End: 2021-09-21

## 2021-09-07 NOTE — PROGRESS NOTES
Transitional Care Follow Up Visit     Patient Name: Starr Ruiz  : 1980   MRN: [id number]     Chief Complaint:    Chief Complaint   Patient presents with   • Hospital Follow Up Visit       History of Present Illness: Starr Ruiz is a 41 y.o. female who presents today for transitional care management visit.  The patient was contacted by our office within 48 hours after the discharge of the patient via telephone to coordinate their follow up care and needs.  Patient presents for follow-up concerning her recent hospital stay.  She reports she continues to have body aches which started yesterday.  Patient has shortness of breath on exertion now.  She had chills last night and has some diarrhea.  Patient says overall she feels the same as she did while in the hospital.  She reported that they sent her home with Lasix but this is not helping.  She thinks the breathing treatments help somewhat.  Patient is currently not on steroids or antibiotics.  She has contact with Covid but has had 5 - Covid test.              Subjective      Review of Systems:   Review of Systems   Constitutional: Positive for chills and fatigue.   Respiratory: Positive for cough and shortness of breath.        I reviewed and discussed the details of that call along with the discharge summary, hospital problems, inpatient lab results, inpatient diagnostic studies, and consultation reports with Starr Ruiz.     Current outpatient and discharge medications have been reconciled for the patient.  Date of TCM Phone Call 2021   [facility]   Date of Admission 2021   Date of Discharge 2021   Discharge Disposition Home or Self Care       Medications:     Current Outpatient Medications:   •  albuterol sulfate  (90 Base) MCG/ACT inhaler, Inhale 2 puffs Every 4 (Four) Hours As Needed for Wheezing or Shortness of Air., Disp: , Rfl:   •  escitalopram (LEXAPRO) 10 MG tablet, Take 1 tablet by  "mouth Daily., Disp: 90 tablet, Rfl: 3  •  furosemide (Lasix) 40 MG tablet, Take 1 tablet by mouth As Needed (edema, sob)., Disp: 15 tablet, Rfl: 0  •  hydroCHLOROthiazide (HYDRODIURIL) 12.5 MG tablet, Take 1 tablet by mouth Daily., Disp: 90 tablet, Rfl: 3  •  losartan (COZAAR) 100 MG tablet, Take 1 tablet by mouth Daily., Disp: 90 tablet, Rfl: 3  •  nicotine (NICODERM CQ) 21 MG/24HR patch, Place 1 patch on the skin as directed by provider Daily., Disp: 30 patch, Rfl: 0  •  traZODone (DESYREL) 50 MG tablet, TAKE THREE TABLETS BY MOUTH ONCE NIGHTLY, Disp: 90 tablet, Rfl: 0  •  amoxicillin-clavulanate (Augmentin) 875-125 MG per tablet, Take 1 tablet by mouth 2 (Two) Times a Day., Disp: 14 tablet, Rfl: 0  •  predniSONE (DELTASONE) 20 MG tablet, Take 2 tablets by mouth Daily., Disp: 10 tablet, Rfl: 0    Allergies:   Allergies   Allergen Reactions   • Lisinopril        Hospital Course Information:  Risk for Readmission (LACE) Score: 6 (9/5/2021  6:01 AM)       Course During Hospital Stay: Patient presented to hospital and was evaluated with chest x-ray CT angiogram, and Covid test.  She also had lab work performed.  Lab work showed elevated white blood count, CRP.  Chest x-ray was normal.  CT angiogram was normal.  Covid test was negative.  Patient was treated with steroids, oxygen, and Lasix.  Patient was sent home with Lasix.  Patient says the steroids made her feel funny.  Patient reports that her oxygen level never dipped below 90.  Patient has not been taking the Lasix.  She continues to feel sick today    PMH, PSH, Care Team, Medications including OTC/CAM and Allergies reviewed and updated as appropriate.     Objective     Physical Exam:  Vital Signs:   Vitals:    09/07/21 0808   BP: 118/74   Pulse: 103   Resp: 16   Temp: 97.5 °F (36.4 °C)   TempSrc: Temporal   SpO2: 98%   Weight: (!) 172 kg (378 lb 6.4 oz)   Height: 175.3 cm (69\")   PainSc:   7     Body mass index is 55.88 kg/m².     Physical Exam  Eyes:      " Pupils: Pupils are equal, round, and reactive to light.   Cardiovascular:      Rate and Rhythm: Normal rate and regular rhythm.   Pulmonary:      Effort: Pulmonary effort is normal.      Breath sounds: Normal breath sounds.   Skin:     General: Skin is warm.      Capillary Refill: Capillary refill takes less than 2 seconds.   Neurological:      General: No focal deficit present.      Mental Status: She is alert.   Psychiatric:         Mood and Affect: Mood normal.         Assessment / Plan      Assessment/Plan:   Diagnoses and all orders for this visit:    1. SOB (shortness of breath) (Primary)  -     Cancel: Respiratory Panel, PCR (WITHOUT COVID) - Swab, Nasopharynx; Future  -     POCT Influenza A/B  -     COVID-19,[facility] ROUTINE, NP/OP SWAB IN TRANSPORT MEDIA OR ESWAB 72 HR TAT - Swab, Nasopharynx; Future  -     amoxicillin-clavulanate (Augmentin) 875-125 MG per tablet; Take 1 tablet by mouth 2 (Two) Times a Day.  Dispense: 14 tablet; Refill: 0  -     predniSONE (DELTASONE) 20 MG tablet; Take 2 tablets by mouth Daily.  Dispense: 10 tablet; Refill: 0  -     Cancel: Respiratory Panel, PCR (WITHOUT COVID) - Swab, Nasopharynx  -     COVID-19,[facility] ROUTINE, NP/OP SWAB IN TRANSPORT MEDIA OR ESWAB 72 HR TAT - Swab, Nasopharynx    2. Viral illness  -     Cancel: Respiratory Panel, PCR (WITHOUT COVID) - Swab, Nasopharynx; Future  -     POCT Influenza A/B  -     COVID-19,[facility] ROUTINE, NP/OP SWAB IN TRANSPORT MEDIA OR ESWAB 72 HR TAT - Swab, Nasopharynx; Future  -     Cancel: Respiratory Panel, PCR (WITHOUT COVID) - Swab, Nasopharynx  -     COVID-19,[facility] ROUTINE, NP/OP SWAB IN TRANSPORT MEDIA OR ESWAB 72 HR TAT - Swab, Nasopharynx         1. Differential for her illnesses COPD exacerbation, viral illness.  We will treat patient as COPD exacerbation today with antibiotics and steroids.  We will perform flu and Covid test once again.  Advised patient to get rapid viral panel but she deferred test today.  In future  consider this.      If patient does not improve consider PFT, echo and stress test.      Would consider pulmonary function test at next visit if patient still having cough or shortness of breath to evaluate for COPD.      Transitional Care Management Certification  I certify that the following are true:  1. Communication was made within 2 business days of discharge.  2. Complexity of Medical Decision Making is moderate.  3. Face to face visit occurred within 2 days.    Follow Up:   Return in about 3 months (around 12/7/2021).    Sebastian Farley DO  [facility]

## 2021-09-08 LAB — SARS-COV-2 RNA NOSE QL NAA+PROBE: NOT DETECTED

## 2021-09-15 ENCOUNTER — HOSPITAL ENCOUNTER (OUTPATIENT)
Dept: MAMMOGRAPHY | Facility: HOSPITAL | Age: 41
Discharge: HOME | End: 2021-09-15

## 2021-09-15 ENCOUNTER — TRANSCRIBE ORDERS (OUTPATIENT)
Dept: MAMMOGRAPHY | Facility: HOSPITAL | Age: 41
End: 2021-09-15

## 2021-09-15 ENCOUNTER — HOSPITAL ENCOUNTER (OUTPATIENT)
Dept: ULTRASOUND IMAGING | Facility: HOSPITAL | Age: 41
Discharge: HOME | End: 2021-09-15

## 2021-09-15 DIAGNOSIS — R92.8 ABNORMAL MAMMOGRAM: ICD-10-CM

## 2021-09-15 DIAGNOSIS — R92.8 ABNORMAL MAMMOGRAM: Primary | ICD-10-CM

## 2021-09-15 DIAGNOSIS — G47.00 INSOMNIA, UNSPECIFIED TYPE: ICD-10-CM

## 2021-09-15 PROCEDURE — 76642 ULTRASOUND BREAST LIMITED: CPT | Performed by: RADIOLOGY

## 2021-09-15 PROCEDURE — 76642 ULTRASOUND BREAST LIMITED: CPT

## 2021-09-15 PROCEDURE — 77061 BREAST TOMOSYNTHESIS UNI: CPT | Performed by: RADIOLOGY

## 2021-09-15 PROCEDURE — G0279 TOMOSYNTHESIS, MAMMO: HCPCS

## 2021-09-15 PROCEDURE — 77065 DX MAMMO INCL CAD UNI: CPT | Performed by: RADIOLOGY

## 2021-09-15 PROCEDURE — 77065 DX MAMMO INCL CAD UNI: CPT

## 2021-09-16 RX ORDER — TRAZODONE HYDROCHLORIDE 50 MG/1
150 TABLET ORAL NIGHTLY
Qty: 90 TABLET | Refills: 0 | Status: SHIPPED | OUTPATIENT
Start: 2021-09-16 | End: 2021-10-15 | Stop reason: SDUPTHER

## 2021-09-21 ENCOUNTER — OFFICE VISIT (OUTPATIENT)
Dept: OBSTETRICS AND GYNECOLOGY | Facility: CLINIC | Age: 41
End: 2021-09-21

## 2021-09-21 VITALS
BODY MASS INDEX: 57.89 KG/M2 | SYSTOLIC BLOOD PRESSURE: 140 MMHG | WEIGHT: 293 LBS | DIASTOLIC BLOOD PRESSURE: 80 MMHG | RESPIRATION RATE: 16 BRPM

## 2021-09-21 DIAGNOSIS — Z12.4 CERVICAL CANCER SCREENING: ICD-10-CM

## 2021-09-21 DIAGNOSIS — N94.6 DYSMENORRHEA: ICD-10-CM

## 2021-09-21 DIAGNOSIS — Z01.419 ENCOUNTER FOR WELL WOMAN EXAM WITH ROUTINE GYNECOLOGICAL EXAM: Primary | ICD-10-CM

## 2021-09-21 DIAGNOSIS — N92.1 MENORRHAGIA WITH IRREGULAR CYCLE: ICD-10-CM

## 2021-09-21 PROCEDURE — 99396 PREV VISIT EST AGE 40-64: CPT | Performed by: NURSE PRACTITIONER
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
## 2021-09-30 ENCOUNTER — OFFICE VISIT (OUTPATIENT)
Dept: OBSTETRICS AND GYNECOLOGY | Facility: CLINIC | Age: 41
End: 2021-09-30

## 2021-09-30 VITALS
WEIGHT: 293 LBS | DIASTOLIC BLOOD PRESSURE: 86 MMHG | BODY MASS INDEX: 43.4 KG/M2 | SYSTOLIC BLOOD PRESSURE: 154 MMHG | HEIGHT: 69 IN

## 2021-09-30 DIAGNOSIS — R87.610 ASCUS WITH POSITIVE HIGH RISK HPV CERVICAL: Primary | ICD-10-CM

## 2021-09-30 DIAGNOSIS — R87.810 ASCUS WITH POSITIVE HIGH RISK HPV CERVICAL: Primary | ICD-10-CM

## 2021-09-30 PROCEDURE — 57452 EXAM OF CERVIX W/SCOPE: CPT | Performed by: OBSTETRICS & GYNECOLOGY

## 2021-09-30 NOTE — PROGRESS NOTES
"Date of precedure: 9/30/2021   This patient is seen in consultation from WILMA New for evaluation of an abnormal Pap test.  She gives a history of having a LEEP procedure at 19 years of age for cervical dysplasia.  She indicates that her Pap test following that were negative.  She has had tubal sterilization and a hysteroscopy with a NovaSure endometrial ablation done at [facility].  She has intermittent menses which are occasionally heavy.  She is seen today for colposcopy.  I have explained the procedure to her including the risks of pain, bleeding, and infection.    /86   Ht 175.3 cm (69\")   Wt (!) 174 kg (382 lb 9.6 oz)   Breastfeeding No   BMI 56.50 kg/m²     MEDICALLY INDICATED       Risks and benefits discussed? yes  All questions answered? yes  Consents given by the patient  Written consent obtained? yes    Local anesthesia used:  no    Pre-op indication: ASCUS with POSITIVE high risk HPV  Procedure documentation:    The cervix was initially viewed colposcopically through a green filter.  The cervix was next bathed in dilute, acetic acid.   The findings were as follows:      The transformation zone was not able to be seen adequately.    Findings  No visible lesions.  With the longest speculum that we had in the office I was unable to completely visualize the cervix.  I was unable to safely do cervical biopsies. Physical Exam: Lungs- clear to auscultation; C-V- RRR with no murmur, rub or gallop; Abd.- Soft, obese, no organomegaly           Ectocervical biopsies not obtained..  An ECC was not performed.      Colposcopic Impression:   1. Inadequate colposcopy  2. Colposcopic findings unable to confirm diagnosis.  3. ASCUS, with HPV, non-16/18+               Plan: Follow-up in 4 month(s) for a repeat Pap test with use of an extra long speculum.    *Please note that portions of this documentation may have been completed with a voice recognition program.  Efforts were made " to edit this dictation, but occasional words may have been mistranscribed.     This note was electronically signed.    ALFREDITO Dunham MD  September 30, 2021  11:01 EDT

## 2021-10-12 ENCOUNTER — HOSPITAL ENCOUNTER (OUTPATIENT)
Dept: MAMMOGRAPHY | Facility: HOSPITAL | Age: 41
End: 2021-10-12

## 2021-10-15 DIAGNOSIS — F41.9 ANXIETY: ICD-10-CM

## 2021-10-15 DIAGNOSIS — G47.00 INSOMNIA, UNSPECIFIED TYPE: ICD-10-CM

## 2021-10-15 DIAGNOSIS — I10 ESSENTIAL HYPERTENSION: Chronic | ICD-10-CM

## 2021-10-15 RX ORDER — LOSARTAN POTASSIUM 100 MG/1
100 TABLET ORAL DAILY
Qty: 90 TABLET | Refills: 3 | Status: SHIPPED | OUTPATIENT
Start: 2021-10-15 | End: 2022-01-17 | Stop reason: SDUPTHER

## 2021-10-15 RX ORDER — TRAZODONE HYDROCHLORIDE 50 MG/1
150 TABLET ORAL NIGHTLY
Qty: 90 TABLET | Refills: 0 | Status: SHIPPED | OUTPATIENT
Start: 2021-10-15 | End: 2021-12-15

## 2021-10-15 RX ORDER — ESCITALOPRAM OXALATE 10 MG/1
10 TABLET ORAL DAILY
Qty: 90 TABLET | Refills: 3 | Status: SHIPPED | OUTPATIENT
Start: 2021-10-15 | End: 2022-01-17 | Stop reason: SDUPTHER

## 2021-10-16 LAB — FUNGUS WND CULT: NORMAL

## 2021-10-18 RX ORDER — TRAZODONE HYDROCHLORIDE 50 MG/1
TABLET ORAL
Qty: 90 TABLET | Refills: 0 | Status: SHIPPED | OUTPATIENT
Start: 2021-10-18 | End: 2021-11-13 | Stop reason: SDUPTHER

## 2021-11-05 ENCOUNTER — OFFICE VISIT (OUTPATIENT)
Dept: FAMILY MEDICINE CLINIC | Facility: CLINIC | Age: 41
End: 2021-11-05

## 2021-11-05 VITALS
BODY MASS INDEX: 43.4 KG/M2 | TEMPERATURE: 99.1 F | RESPIRATION RATE: 14 BRPM | WEIGHT: 293 LBS | OXYGEN SATURATION: 96 % | DIASTOLIC BLOOD PRESSURE: 102 MMHG | SYSTOLIC BLOOD PRESSURE: 168 MMHG | HEIGHT: 69 IN | HEART RATE: 100 BPM

## 2021-11-05 DIAGNOSIS — Z72.0 TOBACCO ABUSE: Chronic | ICD-10-CM

## 2021-11-05 DIAGNOSIS — J06.9 UPPER RESPIRATORY TRACT INFECTION, UNSPECIFIED TYPE: Primary | ICD-10-CM

## 2021-11-05 DIAGNOSIS — I10 ESSENTIAL HYPERTENSION: Chronic | ICD-10-CM

## 2021-11-05 PROBLEM — R03.0 ELEVATED BLOOD PRESSURE READING: Status: ACTIVE | Noted: 2021-11-05

## 2021-11-05 LAB
EXPIRATION DATE: NORMAL
EXPIRATION DATE: NORMAL
FLUAV AG NPH QL: NEGATIVE
FLUBV AG NPH QL: NEGATIVE
INTERNAL CONTROL: NORMAL
INTERNAL CONTROL: NORMAL
Lab: NORMAL
Lab: NORMAL
S PYO AG THROAT QL: NEGATIVE

## 2021-11-05 PROCEDURE — U0003 INFECTIOUS AGENT DETECTION BY NUCLEIC ACID (DNA OR RNA); SEVERE ACUTE RESPIRATORY SYNDROME CORONAVIRUS 2 (SARS-COV-2) (CORONAVIRUS DISEASE [COVID-19]), AMPLIFIED PROBE TECHNIQUE, MAKING USE OF HIGH THROUGHPUT TECHNOLOGIES AS DESCRIBED BY CMS-2020-01-R: HCPCS | Performed by: STUDENT IN AN ORGANIZED HEALTH CARE EDUCATION/TRAINING PROGRAM

## 2021-11-05 PROCEDURE — 87804 INFLUENZA ASSAY W/OPTIC: CPT | Performed by: STUDENT IN AN ORGANIZED HEALTH CARE EDUCATION/TRAINING PROGRAM

## 2021-11-05 PROCEDURE — 87880 STREP A ASSAY W/OPTIC: CPT | Performed by: STUDENT IN AN ORGANIZED HEALTH CARE EDUCATION/TRAINING PROGRAM

## 2021-11-05 PROCEDURE — 99214 OFFICE O/P EST MOD 30 MIN: CPT | Performed by: STUDENT IN AN ORGANIZED HEALTH CARE EDUCATION/TRAINING PROGRAM

## 2021-11-05 NOTE — ASSESSMENT & PLAN NOTE
With shortness of breath and suspected copd in the past. Will order for pfts in one month. Counseled on cessation caty while sick.

## 2021-11-05 NOTE — ASSESSMENT & PLAN NOTE
Likely elevated today given her symptoms. Will have her check at home and let us know if remains elevated.

## 2021-11-05 NOTE — PROGRESS NOTES
New Patient Office Visit      Patient Name: Starr Ruiz  : 1980   MRN: [id number]     Chief Complaint:  Sore Throat and Nasal Congestion (x 2 days)     History of Present Illness:     Started Wednesday with a sore throat. Yesterday she had congestion and headache and today seems worse. She has loss taste and smell. Throat still feels sore. No muscle aches. No fever but subjectivly feels hot. No sick contacts but works around a lot of people. Does have diarrhea. Has not had covid or flu shot. Has had some cough. Is a smoke. She does have some phlegm when she coughs that is white. No ear pain or eye problems. Feels weak all over and tired. Has taken cold and flu dayquil which has helped.       Subjective        Past Medical History:   Diagnosis Date   • Anxiety    • Depression    • Hypertension        Past Surgical History:   Procedure Laterality Date   • D & C HYSTEROSCOPY WITH NOVASURE ENDOMETRIAL ABLATION AND MYOSURE     • LEEP     • TOOTH EXTRACTION     • TUBAL ABDOMINAL LIGATION         Family History   Problem Relation Age of Onset   • No Known Problems Mother    • No Known Problems Father    • Breast cancer Other         age unknown    • Endometrial cancer Neg Hx    • Ovarian cancer Neg Hx        Social History     Socioeconomic History   • Marital status: Single   Tobacco Use   • Smoking status: Current Every Day Smoker     Packs/day: 1.00     Years: 25.00     Pack years: 25.00     Types: Cigarettes   • Smokeless tobacco: Never Used   Substance and Sexual Activity   • Alcohol use: Not Currently     Comment: rare   • Drug use: No   • Sexual activity: Yes     Partners: Male     Birth control/protection: None, Surgical          Current Outpatient Medications:   •  albuterol sulfate  (90 Base) MCG/ACT inhaler, Inhale 2 puffs Every 4 (Four) Hours As Needed for Wheezing or Shortness of Air., Disp: , Rfl:   •  escitalopram (LEXAPRO) 10 MG tablet, Take 1 tablet by mouth Daily., Disp: 90  "tablet, Rfl: 3  •  furosemide (Lasix) 40 MG tablet, Take 1 tablet by mouth As Needed (edema, sob)., Disp: 15 tablet, Rfl: 0  •  hydroCHLOROthiazide (HYDRODIURIL) 12.5 MG tablet, Take 1 tablet by mouth Daily., Disp: 90 tablet, Rfl: 3  •  losartan (COZAAR) 100 MG tablet, Take 1 tablet by mouth Daily., Disp: 90 tablet, Rfl: 3  •  nicotine (NICODERM CQ) 21 MG/24HR patch, Place 1 patch on the skin as directed by provider Daily., Disp: 30 patch, Rfl: 0  •  predniSONE (DELTASONE) 20 MG tablet, Take 2 tablets by mouth Daily., Disp: 10 tablet, Rfl: 0  •  traZODone (DESYREL) 50 MG tablet, Take 3 tablets by mouth Every Night., Disp: 90 tablet, Rfl: 0  •  traZODone (DESYREL) 50 MG tablet, TAKE THREE TABLETS BY MOUTH EVERY NIGHT AT BEDTIME, Disp: 90 tablet, Rfl: 0    Allergies   Allergen Reactions   • Lisinopril        Objective     Physical Exam:  Vitals:    11/05/21 1104   BP: (!) 168/102   Pulse: 100   Resp: 14   Temp: 99.1 °F (37.3 °C)   SpO2: 96%   Weight: (!) 172 kg (380 lb)   Height: 175.3 cm (69\")      Body mass index is 56.12 kg/m².     Physical Exam  Constitutional:       General: She is not in acute distress.     Appearance: Normal appearance.   HENT:      Head: Normocephalic and atraumatic.      Ears:      Comments: tms with bilateral clear effusion no pus  Eyes:      Extraocular Movements: Extraocular movements intact.   Cardiovascular:      Rate and Rhythm: Normal rate and regular rhythm.      Heart sounds: No murmur heard.      Pulmonary:      Effort: Pulmonary effort is normal. No respiratory distress.      Breath sounds: Normal breath sounds.   Abdominal:      General: Abdomen is flat.   Musculoskeletal:         General: No swelling.      Cervical back: Normal range of motion.   Skin:     Findings: No rash.   Neurological:      General: No focal deficit present.      Mental Status: She is alert.   Psychiatric:         Mood and Affect: Mood normal.              Assessment / Plan      Assessment/Plan:   Diagnoses " and all orders for this visit:    1. Upper respiratory tract infection, unspecified type (Primary)  Assessment & Plan:  covid testing pending. Will have her quarantine until test result is back. Supportive care for now. Told patient to go to er for any sob, cp, worsening symptoms. Strep and flu negative.       Orders:  -     COVID-19,[facility] ROUTINE, NP/OP SWAB IN TRANSPORT MEDIA OR ESWAB 72 HR TAT - Swab, Nasopharynx; Future  -     POCT rapid strep A  -     POCT Influenza A/B    2. Tobacco abuse  Assessment & Plan:  With shortness of breath and suspected copd in the past. Will order for pfts in one month. Counseled on cessation caty while sick.    Orders:  -     Cancel: Full Pulmonary Function Test With Bronchodilator; Future  -     Full Pulmonary Function Test With Bronchodilator; Future    3. Essential hypertension  Assessment & Plan:  Likely elevated today given her symptoms. Will have her check at home and let us know if remains elevated.         Return for Next scheduled follow up.       Mahi Sidhu D.O.  [facility] [place]

## 2021-11-05 NOTE — ASSESSMENT & PLAN NOTE
covid testing pending. Will have her quarantine until test result is back. Supportive care for now. Told patient to go to er for any sob, cp, worsening symptoms. Strep and flu negative.

## 2021-11-07 LAB
LABCORP SARS-COV-2, NAA 2 DAY TAT: NORMAL
SARS-COV-2 RNA RESP QL NAA+PROBE: NOT DETECTED

## 2021-11-10 DIAGNOSIS — R06.02 SHORT OF BREATH ON EXERTION: Primary | ICD-10-CM

## 2021-11-13 DIAGNOSIS — G47.00 INSOMNIA, UNSPECIFIED TYPE: ICD-10-CM

## 2021-11-15 RX ORDER — TRAZODONE HYDROCHLORIDE 50 MG/1
150 TABLET ORAL NIGHTLY
Qty: 90 TABLET | Refills: 0 | Status: SHIPPED | OUTPATIENT
Start: 2021-11-15 | End: 2021-12-14 | Stop reason: SDUPTHER

## 2021-11-21 ENCOUNTER — APPOINTMENT (OUTPATIENT)
Dept: PREADMISSION TESTING | Facility: HOSPITAL | Age: 41
End: 2021-11-21

## 2021-11-23 ENCOUNTER — APPOINTMENT (OUTPATIENT)
Dept: PULMONOLOGY | Facility: HOSPITAL | Age: 41
End: 2021-11-23

## 2021-12-14 DIAGNOSIS — G47.00 INSOMNIA, UNSPECIFIED TYPE: ICD-10-CM

## 2021-12-15 RX ORDER — TRAZODONE HYDROCHLORIDE 50 MG/1
150 TABLET ORAL NIGHTLY
Qty: 90 TABLET | Refills: 0 | Status: SHIPPED | OUTPATIENT
Start: 2021-12-15 | End: 2022-01-17 | Stop reason: SDUPTHER

## 2022-01-15 ENCOUNTER — TELEMEDICINE (OUTPATIENT)
Dept: FAMILY MEDICINE CLINIC | Facility: TELEHEALTH | Age: 42
End: 2022-01-15

## 2022-01-15 VITALS — TEMPERATURE: 102 F

## 2022-01-15 DIAGNOSIS — R51.9 ACUTE NONINTRACTABLE HEADACHE, UNSPECIFIED HEADACHE TYPE: ICD-10-CM

## 2022-01-15 DIAGNOSIS — Z20.822 ENCOUNTER BY TELEHEALTH FOR SUSPECTED COVID-19: Primary | ICD-10-CM

## 2022-01-15 DIAGNOSIS — R11.0 NAUSEA: ICD-10-CM

## 2022-01-15 DIAGNOSIS — R42 LIGHT HEADED: ICD-10-CM

## 2022-01-15 DIAGNOSIS — R50.9 FEVER, UNSPECIFIED FEVER CAUSE: ICD-10-CM

## 2022-01-15 PROCEDURE — 99213 OFFICE O/P EST LOW 20 MIN: CPT | Performed by: NURSE PRACTITIONER

## 2022-01-15 PROCEDURE — U0004 COV-19 TEST NON-CDC HGH THRU: HCPCS | Performed by: NURSE PRACTITIONER

## 2022-01-15 RX ORDER — ONDANSETRON 4 MG/1
4 TABLET, ORALLY DISINTEGRATING ORAL EVERY 8 HOURS PRN
Qty: 9 TABLET | Refills: 0 | Status: SHIPPED | OUTPATIENT
Start: 2022-01-15 | End: 2022-06-30

## 2022-01-15 NOTE — PROGRESS NOTES
You have chosen to receive care through a telehealth visit.  Do you consent to use a video/audio connection for your medical care today? Yes     CHIEF COMPLAINT  Chief Complaint   Patient presents with   • Fever   • Headache   • Generalized Body Aches   • Cough   • Dizziness         HPI  Starr Ruiz is a 41 y.o. female  presents with complaint of covid exposure on Monday and now symptoms present for 2 days and include: cough, fever, headache, body aches and light headed feeling.       Review of Systems   Constitutional: Positive for activity change, appetite change, chills, fatigue and fever.   HENT: Positive for congestion.    Respiratory: Positive for cough. Negative for shortness of breath, wheezing and stridor.    Gastrointestinal: Negative.    Musculoskeletal: Positive for myalgias.   Neurological: Positive for dizziness and headaches.       Past Medical History:   Diagnosis Date   • Anxiety    • Depression    • Hypertension        Family History   Problem Relation Age of Onset   • No Known Problems Mother    • No Known Problems Father    • Breast cancer Other         age unknown    • Endometrial cancer Neg Hx    • Ovarian cancer Neg Hx        Social History     Socioeconomic History   • Marital status: Single   Tobacco Use   • Smoking status: Current Every Day Smoker     Packs/day: 1.00     Years: 25.00     Pack years: 25.00     Types: Cigarettes   • Smokeless tobacco: Never Used   Substance and Sexual Activity   • Alcohol use: Not Currently     Comment: rare   • Drug use: No   • Sexual activity: Yes     Partners: Male     Birth control/protection: None, Surgical         Temp (!) 102 °F (38.9 °C)     PHYSICAL EXAM  Physical Exam   Constitutional: She is oriented to person, place, and time. She appears well-developed and well-nourished. She does not have a sickly appearance. She does not appear ill. No distress.   HENT:   Head: Normocephalic and atraumatic.   Right Ear: Hearing normal.   Left Ear:  Hearing normal.   Pulmonary/Chest: Effort normal.  No respiratory distress.  Lymphadenopathy:     She has no cervical adenopathy.   Neurological: She is alert and oriented to person, place, and time.   Psychiatric: She has a normal mood and affect.   Vitals reviewed.      Results for orders placed or performed in visit on 11/05/21   COVID-19,[facility] ROUTINE, NP/OP SWAB IN TRANSPORT MEDIA OR ESWAB 72 HR TAT - Swab, Nasopharynx    Specimen: Nasopharynx; Swab   Result Value Ref Range    SARS-CoV-2, RALPH Not Detected Not Detected   SARS-CoV-2, RALPH 2 DAY TAT - Swab, Nasopharynx    Specimen: Nasopharynx; Swab   Result Value Ref Range    [facility] SARS-COV-2, [facility]AL[facility] 2 DAY TAT Performed    POCT rapid strep A    Specimen: Swab   Result Value Ref Range    Rapid Strep A Screen Negative Negative, VALID, INVALID, Not Performed    Internal Control Passed Passed    Lot Number [id number]     Expiration Date 04-    POCT Influenza A/B    Specimen: Swab   Result Value Ref Range    Rapid Influenza A Ag Negative Negative    Rapid Influenza B Ag Negative Negative    Internal Control Passed Passed    Lot Number [id number]     Expiration Date 05/07/2022        Diagnoses and all orders for this visit:    1. Encounter by telehealth for suspected COVID-19 (Primary)  -     QUESTIONNAIRE SERIES  -     COVID-19,[facility] PANTHER([facility]),[facility]/ [facility], NP/OP SWAB IN UTM/VTM/SALINE TRANSPORT MEDIA,24 HR TAT - Swab, Nasopharynx; Future    2. Nausea  -     ondansetron ODT (Zofran ODT) 4 MG disintegrating tablet; Place 1 tablet on the tongue Every 8 (Eight) Hours As Needed for Nausea.  Dispense: 9 tablet; Refill: 0    3. Acute nonintractable headache, unspecified headache type    4. Fever, unspecified fever cause    5. Light headed    Tylenol as directed prn fever/headache pain   Coricidin HBP as directed prn cough and congesiton  Discussed quarantine guidelines.   Stay quarantined until test results available.   Fluids and rest     See below:  Please  quarantine for 10 days since symptoms first appeared, make sure it has also been at least 24 hours without fever without the use of fever-reducing medications AND that other symptoms of Covid-19 are improving, especially cough and shortness of breath.      FOLLOW-UP  As discussed during visit with PCP/[facility] if no improvement or Urgent Care/Emergency Department if worsening of symptoms    Patient verbalizes understanding of medication dosage, comfort measures, instructions for treatment and follow-up.    Cortney Gonzalez, APRN  01/15/2022  09:12 EST    This visit was performed via Telehealth.  This patient has been instructed to follow-up with their primary care provider if their symptoms worsen or the treatment provided does not resolve their illness.

## 2022-01-15 NOTE — PATIENT INSTRUCTIONS
General Headache Without Cause  A headache is pain or discomfort that is felt around the head or neck area. There are many causes and types of headaches. In some cases, the cause may not be found.  Follow these instructions at home:  Watch your condition for any changes. Let your doctor know about them. Take these steps to help with your condition:  Managing pain         · Take over-the-counter and prescription medicines only as told by your doctor.  · Lie down in a dark, quiet room when you have a headache.  · If told, put ice on your head and neck area:  ? Put ice in a plastic bag.  ? Place a towel between your skin and the bag.  ? Leave the ice on for 20 minutes, 2-3 times per day.  · If told, put heat on the affected area. Use the heat source that your doctor recommends, such as a moist heat pack or a heating pad.  ? Place a towel between your skin and the heat source.  ? Leave the heat on for 20-30 minutes.  ? Remove the heat if your skin turns bright red. This is very important if you are unable to feel pain, heat, or cold. You may have a greater risk of getting burned.  · Keep lights dim if bright lights bother you or make your headaches worse.  Eating and drinking  · Eat meals on a regular schedule.  · If you drink alcohol:  ? Limit how much you use to:  § 0-1 drink a day for women.  § 0-2 drinks a day for men.  ? Be aware of how much alcohol is in your drink. In the [place] one drink equals one 12 oz bottle of beer (355 mL), one 5 oz glass of wine (148 mL), or one 1½ oz glass of hard liquor (44 mL).  · Stop drinking caffeine, or reduce how much caffeine you drink.  General instructions    · Keep a journal to find out if certain things bring on headaches. For example, write down:  ? What you eat and drink.  ? How much sleep you get.  ? Any change to your diet or medicines.  · Get a massage or try other ways to relax.  · Limit stress.  · Sit up straight. Do not tighten (tense) your muscles.  · Do not use any  products that contain nicotine or tobacco. This includes cigarettes, e-cigarettes, and chewing tobacco. If you need help quitting, ask your doctor.  · Exercise regularly as told by your doctor.  · Get enough sleep. This often means 7-9 hours of sleep each night.  · Keep all follow-up visits as told by your doctor. This is important.    Contact a doctor if:  · Your symptoms are not helped by medicine.  · You have a headache that feels different than the other headaches.  · You feel sick to your stomach (nauseous) or you throw up (vomit).  · You have a fever.  Get help right away if:  · Your headache gets very bad quickly.  · Your headache gets worse after a lot of physical activity.  · You keep throwing up.  · You have a stiff neck.  · You have trouble seeing.  · You have trouble speaking.  · You have pain in the eye or ear.  · Your muscles are weak or you lose muscle control.  · You lose your balance or have trouble walking.  · You feel like you will pass out (faint) or you pass out.  · You are mixed up (confused).  · You have a seizure.  Summary  · A headache is pain or discomfort that is felt around the head or neck area.  · There are many causes and types of headaches. In some cases, the cause may not be found.  · Keep a journal to help find out what causes your headaches. Watch your condition for any changes. Let your doctor know about them.  · Contact a doctor if you have a headache that is different from usual, or if your headache is not helped by medicine.  · Get help right away if your headache gets very bad, you throw up, you have trouble seeing, you lose your balance, or you have a seizure.  This information is not intended to replace advice given to you by your health care provider. Make sure you discuss any questions you have with your health care provider.  Document Revised: 07/08/2019 Document Reviewed: 07/08/2019  Elsevier Patient Education © 2021 [employer]      Cough, Adult  A cough helps to  clear your throat and lungs. A cough may be a sign of an illness or another medical condition.  An acute cough may only last 2-3 weeks, while a chronic cough may last 8 or more weeks.  Many things can cause a cough. They include:  · Germs (viruses or bacteria) that attack the airway.  · Breathing in things that bother (irritate) your lungs.  · Allergies.  · Asthma.  · Mucus that runs down the back of your throat (postnasal drip).  · Smoking.  · Acid backing up from the stomach into the tube that moves food from the mouth to the stomach (gastroesophageal reflux).  · Some medicines.  · Lung problems.  · Other medical conditions, such as heart failure or a blood clot in the lung (pulmonary embolism).  Follow these instructions at home:  Medicines  · Take over-the-counter and prescription medicines only as told by your doctor.  · Talk with your doctor before you take medicines that stop a cough (cough suppressants).  Lifestyle    · Do not smoke, and try not to be around smoke. Do not use any products that contain nicotine or tobacco, such as cigarettes, e-cigarettes, and chewing tobacco. If you need help quitting, ask your doctor.  · Drink enough fluid to keep your pee (urine) pale yellow.  · Avoid caffeine.  · Do not drink alcohol if your doctor tells you not to drink.    General instructions    · Watch for any changes in your cough. Tell your doctor about them.  · Always cover your mouth when you cough.  · Stay away from things that make you cough, such as perfume, candles, campfire smoke, or cleaning products.  · If the air is dry, use a cool mist vaporizer or humidifier in your home.  · If your cough is worse at night, try using extra pillows to raise your head up higher while you sleep.  · Rest as needed.  · Keep all follow-up visits as told by your doctor. This is important.    Contact a doctor if:  · You have new symptoms.  · You cough up pus.  · Your cough does not get better after 2-3 weeks, or your cough gets  worse.  · Cough medicine does not help your cough and you are not sleeping well.  · You have pain that gets worse or pain that is not helped with medicine.  · You have a fever.  · You are losing weight and you do not know why.  · You have night sweats.  Get help right away if:  · You cough up blood.  · You have trouble breathing.  · Your heartbeat is very fast.  These symptoms may be an emergency. Do not wait to see if the symptoms will go away. Get medical help right away. Call your local emergency services (911 in the [place] Do not drive yourself to the hospital.  Summary  · A cough helps to clear your throat and lungs. Many things can cause a cough.  · Take over-the-counter and prescription medicines only as told by your doctor.  · Always cover your mouth when you cough.  · Contact a doctor if you have new symptoms or you have a cough that does not get better or gets worse.  This information is not intended to replace advice given to you by your health care provider. Make sure you discuss any questions you have with your health care provider.  Document Revised: 02/05/2021 Document Reviewed: 01/06/2020  [employer] Patient Education © 2021 [employer]      Fever, Adult         A fever is an increase in your body's temperature. It often means a temperature of 100.4°F (38°C) or higher. Brief mild or moderate fevers often have no long-term effects. They often do not need treatment. Moderate or high fevers may make you feel uncomfortable. Sometimes, they can be a sign of a serious illness or disease. A fever that keeps coming back or that lasts a long time may cause you to lose water in your body (get dehydrated).  You can take your temperature with a thermometer to see if you have a fever. Temperature can change with:  · Age.  · Time of day.  · Where the thermometer is put in the body. Readings may vary when the thermometer is put:  ? In the mouth (oral).  ? In the butt (rectal).  ? In the ear (tympanic).  ? Under the  arm (axillary).  ? On the forehead (temporal).  Follow these instructions at home:  Medicines  · Take over-the-counter and prescription medicines only as told by your doctor. Follow the dosing instructions carefully.  · If you were prescribed an antibiotic medicine, take it as told by your doctor. Do not stop taking it even if you start to feel better.  General instructions  · Watch for any changes in your symptoms. Tell your doctor about them.  · Rest as needed.  · Drink enough fluid to keep your pee (urine) pale yellow.  · Sponge yourself or bathe with room-temperature water as needed. This helps to lower your body temperature. Do not use ice water.  · Do not use too many blankets or wear clothes that are too heavy.  · If your fever was caused by an infection that spreads from person to person (is contagious), such as a cold or the flu:  ? You should stay home from work and public places for at least 24 hours after your fever is gone.  ? Your fever should be gone for at least 24 hours without the need to use medicines.  Contact a doctor if:  · You throw up (vomit).  · You cannot eat or drink without throwing up.  · You have watery poop (diarrhea).  · It hurts when you pee.  · Your symptoms do not get better with treatment.  · You have new symptoms.  · You feel very weak.  Get help right away if:  · You are short of breath or have trouble breathing.  · You are dizzy or you pass out (faint).  · You feel mixed up (confused).  · You have signs of not having enough water in your body, such as:  ? Dark pee, very little pee, or no pee.  ? Cracked lips.  ? Dry mouth.  ? Sunken eyes.  ? Sleepiness.  ? Weakness.  · You have very bad pain in your belly (abdomen).  · You keep throwing up or having watery poop.  · You have a rash on your skin.  · Your symptoms get worse all of a sudden.  Summary  · A fever is an increase in your body's temperature. It often means a temperature of 100.4°F (38°C) or higher.  · Watch for any  changes in your symptoms. Tell your doctor about them.  · Take all medicines only as told by your doctor.  · Do not go to work or other public places if your fever was caused by an illness that can spread to other people.  · Get help right away if you have signs that you do not have enough water in your body.  This information is not intended to replace advice given to you by your health care provider. Make sure you discuss any questions you have with your health care provider.  Document Revised: 06/03/2019 Document Reviewed: 06/03/2019  [employer] Patient Education © 2021 [employer]    Cough, Adult  A cough helps to clear your throat and lungs. A cough may be a sign of an illness or another medical condition.  An acute cough may only last 2-3 weeks, while a chronic cough may last 8 or more weeks.  Many things can cause a cough. They include:  · Germs (viruses or bacteria) that attack the airway.  · Breathing in things that bother (irritate) your lungs.  · Allergies.  · Asthma.  · Mucus that runs down the back of your throat (postnasal drip).  · Smoking.  · Acid backing up from the stomach into the tube that moves food from the mouth to the stomach (gastroesophageal reflux).  · Some medicines.  · Lung problems.  · Other medical conditions, such as heart failure or a blood clot in the lung (pulmonary embolism).  Follow these instructions at home:  Medicines  · Take over-the-counter and prescription medicines only as told by your doctor.  · Talk with your doctor before you take medicines that stop a cough (cough suppressants).  Lifestyle    · Do not smoke, and try not to be around smoke. Do not use any products that contain nicotine or tobacco, such as cigarettes, e-cigarettes, and chewing tobacco. If you need help quitting, ask your doctor.  · Drink enough fluid to keep your pee (urine) pale yellow.  · Avoid caffeine.  · Do not drink alcohol if your doctor tells you not to drink.    General instructions    · Watch  for any changes in your cough. Tell your doctor about them.  · Always cover your mouth when you cough.  · Stay away from things that make you cough, such as perfume, candles, campfire smoke, or cleaning products.  · If the air is dry, use a cool mist vaporizer or humidifier in your home.  · If your cough is worse at night, try using extra pillows to raise your head up higher while you sleep.  · Rest as needed.  · Keep all follow-up visits as told by your doctor. This is important.    Contact a doctor if:  · You have new symptoms.  · You cough up pus.  · Your cough does not get better after 2-3 weeks, or your cough gets worse.  · Cough medicine does not help your cough and you are not sleeping well.  · You have pain that gets worse or pain that is not helped with medicine.  · You have a fever.  · You are losing weight and you do not know why.  · You have night sweats.  Get help right away if:  · You cough up blood.  · You have trouble breathing.  · Your heartbeat is very fast.  These symptoms may be an emergency. Do not wait to see if the symptoms will go away. Get medical help right away. Call your local emergency services (911 in the [place] Do not drive yourself to the hospital.  Summary  · A cough helps to clear your throat and lungs. Many things can cause a cough.  · Take over-the-counter and prescription medicines only as told by your doctor.  · Always cover your mouth when you cough.  · Contact a doctor if you have new symptoms or you have a cough that does not get better or gets worse.  This information is not intended to replace advice given to you by your health care provider. Make sure you discuss any questions you have with your health care provider.  Document Revised: 02/05/2021 Document Reviewed: 01/06/2020  Elsevier Patient Education © 2021 [employer]      General Headache Without Cause  A headache is pain or discomfort that is felt around the head or neck area. There are many causes and types of  headaches. In some cases, the cause may not be found.  Follow these instructions at home:  Watch your condition for any changes. Let your doctor know about them. Take these steps to help with your condition:  Managing pain         · Take over-the-counter and prescription medicines only as told by your doctor.  · Lie down in a dark, quiet room when you have a headache.  · If told, put ice on your head and neck area:  ? Put ice in a plastic bag.  ? Place a towel between your skin and the bag.  ? Leave the ice on for 20 minutes, 2-3 times per day.  · If told, put heat on the affected area. Use the heat source that your doctor recommends, such as a moist heat pack or a heating pad.  ? Place a towel between your skin and the heat source.  ? Leave the heat on for 20-30 minutes.  ? Remove the heat if your skin turns bright red. This is very important if you are unable to feel pain, heat, or cold. You may have a greater risk of getting burned.  · Keep lights dim if bright lights bother you or make your headaches worse.  Eating and drinking  · Eat meals on a regular schedule.  · If you drink alcohol:  ? Limit how much you use to:  § 0-1 drink a day for women.  § 0-2 drinks a day for men.  ? Be aware of how much alcohol is in your drink. In the [place] one drink equals one 12 oz bottle of beer (355 mL), one 5 oz glass of wine (148 mL), or one 1½ oz glass of hard liquor (44 mL).  · Stop drinking caffeine, or reduce how much caffeine you drink.  General instructions    · Keep a journal to find out if certain things bring on headaches. For example, write down:  ? What you eat and drink.  ? How much sleep you get.  ? Any change to your diet or medicines.  · Get a massage or try other ways to relax.  · Limit stress.  · Sit up straight. Do not tighten (tense) your muscles.  · Do not use any products that contain nicotine or tobacco. This includes cigarettes, e-cigarettes, and chewing tobacco. If you need help quitting, ask your  doctor.  · Exercise regularly as told by your doctor.  · Get enough sleep. This often means 7-9 hours of sleep each night.  · Keep all follow-up visits as told by your doctor. This is important.    Contact a doctor if:  · Your symptoms are not helped by medicine.  · You have a headache that feels different than the other headaches.  · You feel sick to your stomach (nauseous) or you throw up (vomit).  · You have a fever.  Get help right away if:  · Your headache gets very bad quickly.  · Your headache gets worse after a lot of physical activity.  · You keep throwing up.  · You have a stiff neck.  · You have trouble seeing.  · You have trouble speaking.  · You have pain in the eye or ear.  · Your muscles are weak or you lose muscle control.  · You lose your balance or have trouble walking.  · You feel like you will pass out (faint) or you pass out.  · You are mixed up (confused).  · You have a seizure.  Summary  · A headache is pain or discomfort that is felt around the head or neck area.  · There are many causes and types of headaches. In some cases, the cause may not be found.  · Keep a journal to help find out what causes your headaches. Watch your condition for any changes. Let your doctor know about them.  · Contact a doctor if you have a headache that is different from usual, or if your headache is not helped by medicine.  · Get help right away if your headache gets very bad, you throw up, you have trouble seeing, you lose your balance, or you have a seizure.  This information is not intended to replace advice given to you by your health care provider. Make sure you discuss any questions you have with your health care provider.  Document Revised: 07/08/2019 Document Reviewed: 07/08/2019  Elsevier Patient Education © 2021 [employer]    10 Things You Can Do to Manage Your COVID-19 Symptoms at Home  If you have possible or confirmed COVID-19:  1. Stay home except to get medical care.  2. Monitor your symptoms  carefully. If your symptoms get worse, call your healthcare provider immediately.  3. Get rest and stay hydrated.  4. If you have a medical appointment, call the healthcare provider ahead of time and tell them that you have or may have COVID-19.  5. For medical emergencies, call 911 and notify the dispatch personnel that you have or may have COVID-19.  6. Cover your cough and sneezes with a tissue or use the inside of your elbow.  7. Wash your hands often with soap and water for at least 20 seconds or clean your hands with an alcohol-based hand  that contains at least 60% alcohol.  8. As much as possible, stay in a specific room and away from other people in your home. Also, you should use a separate bathroom, if available. If you need to be around other people in or outside of the home, wear a mask.  9. Avoid sharing personal items with other people in your household, like dishes, towels, and bedding.  10. Clean all surfaces that are touched often, like counters, tabletops, and doorknobs. Use household cleaning sprays or wipes according to the label instructions.  cdc.gov/coronavirus  07/16/2021  This information is not intended to replace advice given to you by your health care provider. Make sure you discuss any questions you have with your health care provider.  Document Revised: 08/04/2021 Document Reviewed: 08/04/2021  [employer] Patient Education © 2021 [employer]    How to Quarantine at Home  Information for Patients and Families    These instructions are for people with confirmed or suspected COVID-19 who do not need to be hospitalized and those with confirmed COVID-19 who were hospitalized and discharged to care for themselves at home.    If you were tested through the Health Department  The Health Department will monitor your wellbeing.  If it is determined that you do not need to be hospitalized and can be isolated at home, you will be monitored by staff from your local or [facility] health  department.     If you were tested through a Commercial Lab  You will need to monitor yourself and report changes in your symptoms to your doctor.  See the section below called Monitor Your Symptoms.    Follow these steps until a healthcare provider or local or state health department says you can return to your normal activities.    Stay home except to get medical care  • Restrict activities outside your home, except for getting medical care.   • Do not go to work, school, or public areas.   • Avoid using public transportation, ride-sharing, or taxis.    Separate yourself from other people and animals in your home  People  As much as possible, you should stay in a specific room and away from other people in your home. Also, you should use a separate bathroom, if available.    Animals  You should restrict contact with pets and other animals while you are sick with COVID-19, just like you would around other people. When possible, have another member of your household care for your animals while you are sick. If you are sick with COVID-19, avoid contact with your pet, including petting, snuggling, being kissed or licked, and sharing food. If you must care for your pet or be around animals while you are sick, wash your hands before and after you interact with pets and wear a facemask. See COVID-19 and Animals for more information.    Call ahead before visiting your doctor  If you have a medical appointment, call the healthcare provider and tell them that you have or may have COVID-19. This information will help the healthcare provider’s office take steps to keep other people from getting infected or exposed.    Wear a facemask  You should wear a facemask when you are around other people (e.g., sharing a room or vehicle) or pets and before you enter a healthcare provider’s office.     If you are not able to wear a facemask (for example, because it causes trouble breathing), then people who live with you should not stay  in the same room with you, or they should wear a facemask if they enter your room.    Cover your coughs and sneezes  • Cover your mouth and nose with a tissue when you cough or sneeze.   • Throw used tissues in a lined trash can.   • Immediately wash your hands with soap and water for at least 20 seconds or, if soap and water are not available, clean your hands with an alcohol-based hand  that contains at least 60% alcohol.    Clean your hands often  • Wash your hands often with soap and water for at least 20 seconds, especially after blowing your nose, coughing, or sneezing; going to the bathroom; and before eating or preparing food.     • If soap and water are not readily available, use an alcohol-based hand  with at least 60% alcohol, covering all surfaces of your hands and rubbing them together until they feel dry.    • Soap and water are the best option if hands are visibly dirty. Avoid touching your eyes, nose, and mouth with unwashed hands.    Avoid sharing personal household items  • You should not share dishes, drinking glasses, cups, eating utensils, towels, or bedding with other people or pets in your home.   • After using these items, they should be washed thoroughly with soap and water.    Clean all “high-touch” surfaces everyday  • High touch surfaces include counters, tabletops, doorknobs, bathroom fixtures, toilets, phones, keyboards, tablets, and bedside tables.   • Also, clean any surfaces that may have blood, stool, or body fluids on them.   • Use a household cleaning spray or wipe, according to the label instructions. Labels contain instructions for safe and effective use of the cleaning product, including precautions you should take when applying the product, such as wearing gloves and making sure you have good ventilation during use of the product.    Monitor your symptoms  • Seek prompt medical attention if your illness is worsening (e.g., difficulty breathing).   • Before  seeking care, call your healthcare provider and tell them that you have, or are being evaluated for, COVID-19.   • Put on a facemask before you enter the facility.     • These steps will help the healthcare provider’s office to keep other people in the office or waiting room from getting infected or exposed.   • Persons who are placed under active monitoring or facilitated self-monitoring should follow instructions provided by their local health department or occupational health professionals, as appropriate.  • If you have a medical emergency and need to call 911, notify the dispatch personnel that you have, or are being evaluated for COVID-19. If possible, put on a facemask before emergency medical services arrive.    Discontinuing home isolation  Patients with confirmed COVID-19 should remain under home isolation precautions until the risk of secondary transmission to others is thought to be low. The decision to discontinue home isolation precautions should be made on a case-by-case basis, in consultation with healthcare providers and state and local health departments.    The below content are for household members, intimate partners, and caregivers of a patient with symptomatic laboratory-confirmed COVID-19 or a patient under investigation:    Household members, intimate partners, and caregivers may have close contact with a person with symptomatic, laboratory-confirmed COVID-19 or a person under investigation.     Close contacts should monitor their health; they should call their healthcare provider right away if they develop symptoms suggestive of COVID-19 (e.g., fever, cough, shortness of breath)     Close contacts should also follow these recommendations:  • Make sure that you understand and can help the patient follow their healthcare provider’s instructions for medication(s) and care. You should help the patient with basic needs in the home and provide support for getting groceries, prescriptions, and  other personal needs.  • Monitor the patient’s symptoms. If the patient is getting sicker, call his or her healthcare provider and tell them that the patient has laboratory-confirmed COVID-19. This will help the healthcare provider’s office take steps to keep other people in the office or waiting room from getting infected. Ask the healthcare provider to call the local or [facility] health department for additional guidance. If the patient has a medical emergency and you need to call 911, notify the dispatch personnel that the patient has, or is being evaluated for COVID-19.  • Household members should stay in another room or be  from the patient as much as possible. Household members should use a separate bedroom and bathroom, if available.  • Prohibit visitors who do not have an essential need to be in the home.  • Household members should care for any pets in the home. Do not handle pets or other animals while sick.  For more information, see COVID-19 and Animals.  • Make sure that shared spaces in the home have good air flow, such as by an air conditioner or an opened window, weather permitting.  • Perform hand hygiene frequently. Wash your hands often with soap and water for at least 20 seconds or use an alcohol-based hand  that contains 60 to 95% alcohol, covering all surfaces of your hands and rubbing them together until they feel dry. Soap and water should be used preferentially if hands are visibly dirty.  • Avoid touching your eyes, nose, and mouth with unwashed hands.  • The patient should wear a facemask when you are around other people. If the patient is not able to wear a facemask (for example, because it causes trouble breathing), you, as the caregiver, should wear a mask when you are in the same room as the patient.  • Wear a disposable facemask and gloves when you touch or have contact with the patient’s blood, stool, or body fluids, such as saliva, sputum, nasal mucus, vomit, or urine.    o Throw out disposable facemasks and gloves after using them. Do not reuse.  o When removing personal protective equipment, first remove and dispose of gloves. Then, immediately clean your hands with soap and water or alcohol-based hand . Next, remove and dispose of facemask, and immediately clean your hands again with soap and water or alcohol-based hand .  • Avoid sharing household items with the patient. You should not share dishes, drinking glasses, cups, eating utensils, towels, bedding, or other items. After the patient uses these items, you should wash them thoroughly (see below “Wash laundry thoroughly”).  • Clean all “high-touch” surfaces, such as counters, tabletops, doorknobs, bathroom fixtures, toilets, phones, keyboards, tablets, and bedside tables, every day. Also, clean any surfaces that may have blood, stool, or body fluids on them.   o Use a household cleaning spray or wipe, according to the label instructions. Labels contain instructions for safe and effective use of the cleaning product including precautions you should take when applying the product, such as wearing gloves and making sure you have good ventilation during use of the product.  • Wash laundry thoroughly.   o Immediately remove and wash clothes or bedding that have blood, stool, or body fluids on them.  o Wear disposable gloves while handling soiled items and keep soiled items away from your body. Clean your hands (with soap and water or an alcohol-based hand ) immediately after removing your gloves.  o Read and follow directions on labels of laundry or clothing items and detergent. In general, using a normal laundry detergent according to washing machine instructions and dry thoroughly using the warmest temperatures recommended on the clothing label.  • Place all used disposable gloves, facemasks, and other contaminated items in a lined container before disposing of them with other household waste. Clean  your hands (with soap and water or an alcohol-based hand ) immediately after handling these items. Soap and water should be used preferentially if hands are visibly dirty.  • Discuss any additional questions with your state or local health department or healthcare provider.    Adapted from information provided by the Centers for Disease Control and Prevention.  For more information, visit https://www.cdc.gov/coronavirus/2019-ncov/hcp/guidance-prevent-spread.html  Dizziness  Dizziness is a common problem. It makes you feel unsteady or light-headed. You may feel like you are about to pass out (faint). Dizziness can lead to getting hurt if you stumble or fall. Dizziness can be caused by many things, including:  · Medicines.  · Not having enough water in your body (dehydration).  · Illness.  Follow these instructions at home:  Eating and drinking    · Drink enough fluid to keep your pee (urine) clear or pale yellow. This helps to keep you from getting dehydrated. Try to drink more clear fluids, such as water.  · Do not drink alcohol.  · Limit how much caffeine you drink or eat, if your doctor tells you to do that.  · Limit how much salt (sodium) you drink or eat, if your doctor tells you to do that.    Activity    · Avoid making quick movements.  ? When you stand up from sitting in a chair, steady yourself until you feel okay.  ? In the morning, first sit up on the side of the bed. When you feel okay, stand slowly while you hold onto something. Do this until you know that your balance is fine.  · If you need to  one place for a long time, move your legs often. Tighten and relax the muscles in your legs while you are standing.  · Do not drive or use heavy machinery if you feel dizzy.  · Avoid bending down if you feel dizzy. Place items in your home so you can reach them easily without leaning over.    Lifestyle  · Do not use any products that contain nicotine or tobacco, such as cigarettes and  e-cigarettes. If you need help quitting, ask your doctor.  · Try to lower your stress level. You can do this by using methods such as yoga or meditation. Talk with your doctor if you need help.  General instructions  · Watch your dizziness for any changes.  · Take over-the-counter and prescription medicines only as told by your doctor. Talk with your doctor if you think that you are dizzy because of a medicine that you are taking.  · Tell a friend or a family member that you are feeling dizzy. If he or she notices any changes in your behavior, have this person call your doctor.  · Keep all follow-up visits as told by your doctor. This is important.  Contact a doctor if:  · Your dizziness does not go away.  · Your dizziness or light-headedness gets worse.  · You feel sick to your stomach (nauseous).  · You have trouble hearing.  · You have new symptoms.  · You are unsteady on your feet.  · You feel like the room is spinning.  Get help right away if:  · You throw up (vomit) or have watery poop (diarrhea), and you cannot eat or drink anything.  · You have trouble:  ? Talking.  ? Walking.  ? Swallowing.  ? Using your arms, hands, or legs.  · You feel generally weak.  · You are not thinking clearly, or you have trouble forming sentences. A friend or family member may notice this.  · You have:  ? Chest pain.  ? Pain in your belly (abdomen).  ? Shortness of breath.  ? Sweating.  · Your vision changes.  · You are bleeding.  · You have a very bad headache.  · You have neck pain or a stiff neck.  · You have a fever.  These symptoms may be an emergency. Do not wait to see if the symptoms will go away. Get medical help right away. Call your local emergency services (911 in the [place] Do not drive yourself to the hospital.  Summary  · Dizziness makes you feel unsteady or light-headed. You may feel like you are about to pass out (faint).  · Drink enough fluid to keep your pee (urine) clear or pale yellow. Do not drink  alcohol.  · Avoid making quick movements if you feel dizzy.  · Watch your dizziness for any changes.  This information is not intended to replace advice given to you by your health care provider. Make sure you discuss any questions you have with your health care provider.  Document Revised: 12/21/2018 Document Reviewed: 01/04/2018  [employer] Patient Education © 2021 [employer]    Fever, Adult         A fever is an increase in your body's temperature. It often means a temperature of 100.4°F (38°C) or higher. Brief mild or moderate fevers often have no long-term effects. They often do not need treatment. Moderate or high fevers may make you feel uncomfortable. Sometimes, they can be a sign of a serious illness or disease. A fever that keeps coming back or that lasts a long time may cause you to lose water in your body (get dehydrated).  You can take your temperature with a thermometer to see if you have a fever. Temperature can change with:  · Age.  · Time of day.  · Where the thermometer is put in the body. Readings may vary when the thermometer is put:  ? In the mouth (oral).  ? In the butt (rectal).  ? In the ear (tympanic).  ? Under the arm (axillary).  ? On the forehead (temporal).  Follow these instructions at home:  Medicines  · Take over-the-counter and prescription medicines only as told by your doctor. Follow the dosing instructions carefully.  · If you were prescribed an antibiotic medicine, take it as told by your doctor. Do not stop taking it even if you start to feel better.  General instructions  · Watch for any changes in your symptoms. Tell your doctor about them.  · Rest as needed.  · Drink enough fluid to keep your pee (urine) pale yellow.  · Sponge yourself or bathe with room-temperature water as needed. This helps to lower your body temperature. Do not use ice water.  · Do not use too many blankets or wear clothes that are too heavy.  · If your fever was caused by an infection that spreads from  person to person (is contagious), such as a cold or the flu:  ? You should stay home from work and public places for at least 24 hours after your fever is gone.  ? Your fever should be gone for at least 24 hours without the need to use medicines.  Contact a doctor if:  · You throw up (vomit).  · You cannot eat or drink without throwing up.  · You have watery poop (diarrhea).  · It hurts when you pee.  · Your symptoms do not get better with treatment.  · You have new symptoms.  · You feel very weak.  Get help right away if:  · You are short of breath or have trouble breathing.  · You are dizzy or you pass out (faint).  · You feel mixed up (confused).  · You have signs of not having enough water in your body, such as:  ? Dark pee, very little pee, or no pee.  ? Cracked lips.  ? Dry mouth.  ? Sunken eyes.  ? Sleepiness.  ? Weakness.  · You have very bad pain in your belly (abdomen).  · You keep throwing up or having watery poop.  · You have a rash on your skin.  · Your symptoms get worse all of a sudden.  Summary  · A fever is an increase in your body's temperature. It often means a temperature of 100.4°F (38°C) or higher.  · Watch for any changes in your symptoms. Tell your doctor about them.  · Take all medicines only as told by your doctor.  · Do not go to work or other public places if your fever was caused by an illness that can spread to other people.  · Get help right away if you have signs that you do not have enough water in your body.  This information is not intended to replace advice given to you by your health care provider. Make sure you discuss any questions you have with your health care provider.  Document Revised: 06/03/2019 Document Reviewed: 06/03/2019  [employer] Patient Education © 2021 [employer]

## 2022-01-16 LAB — SARS-COV-2 RNA NOSE QL NAA+PROBE: DETECTED

## 2022-01-17 DIAGNOSIS — I10 ESSENTIAL HYPERTENSION: Chronic | ICD-10-CM

## 2022-01-17 DIAGNOSIS — F41.9 ANXIETY: ICD-10-CM

## 2022-01-17 DIAGNOSIS — G47.00 INSOMNIA, UNSPECIFIED TYPE: ICD-10-CM

## 2022-01-17 RX ORDER — TRAZODONE HYDROCHLORIDE 50 MG/1
150 TABLET ORAL NIGHTLY
Qty: 90 TABLET | Refills: 0 | Status: SHIPPED | OUTPATIENT
Start: 2022-01-17 | End: 2022-02-21 | Stop reason: SDUPTHER

## 2022-01-17 RX ORDER — LOSARTAN POTASSIUM 100 MG/1
100 TABLET ORAL DAILY
Qty: 90 TABLET | Refills: 3 | Status: SHIPPED | OUTPATIENT
Start: 2022-01-17 | End: 2022-03-27 | Stop reason: SDUPTHER

## 2022-01-17 RX ORDER — HYDROCHLOROTHIAZIDE 12.5 MG/1
12.5 TABLET ORAL DAILY
Qty: 90 TABLET | Refills: 3 | Status: SHIPPED | OUTPATIENT
Start: 2022-01-17 | End: 2022-03-27 | Stop reason: SDUPTHER

## 2022-01-17 RX ORDER — ESCITALOPRAM OXALATE 10 MG/1
10 TABLET ORAL DAILY
Qty: 90 TABLET | Refills: 3 | Status: SHIPPED | OUTPATIENT
Start: 2022-01-17 | End: 2022-03-27 | Stop reason: SDUPTHER

## 2022-02-20 DIAGNOSIS — G47.00 INSOMNIA, UNSPECIFIED TYPE: ICD-10-CM

## 2022-02-20 RX ORDER — TRAZODONE HYDROCHLORIDE 50 MG/1
150 TABLET ORAL NIGHTLY
Qty: 90 TABLET | Refills: 0 | Status: CANCELLED | OUTPATIENT
Start: 2022-02-20

## 2022-02-21 DIAGNOSIS — G47.00 INSOMNIA, UNSPECIFIED TYPE: ICD-10-CM

## 2022-02-21 RX ORDER — TRAZODONE HYDROCHLORIDE 50 MG/1
150 TABLET ORAL NIGHTLY
Qty: 30 TABLET | Refills: 0 | Status: SHIPPED | OUTPATIENT
Start: 2022-02-21 | End: 2022-03-27 | Stop reason: SDUPTHER

## 2022-02-22 ENCOUNTER — OFFICE VISIT (OUTPATIENT)
Dept: OBSTETRICS AND GYNECOLOGY | Facility: CLINIC | Age: 42
End: 2022-02-22

## 2022-02-22 VITALS
DIASTOLIC BLOOD PRESSURE: 88 MMHG | WEIGHT: 293 LBS | SYSTOLIC BLOOD PRESSURE: 140 MMHG | HEIGHT: 69 IN | BODY MASS INDEX: 43.4 KG/M2

## 2022-02-22 DIAGNOSIS — B37.31 CANDIDIASIS, VAGINA: ICD-10-CM

## 2022-02-22 DIAGNOSIS — A59.01 TRICHOMONAL VAGINITIS: ICD-10-CM

## 2022-02-22 DIAGNOSIS — Z20.2 POSSIBLE EXPOSURE TO STD: ICD-10-CM

## 2022-02-22 DIAGNOSIS — R87.810 ASCUS WITH POSITIVE HIGH RISK HPV CERVICAL: Primary | ICD-10-CM

## 2022-02-22 DIAGNOSIS — R87.610 ASCUS WITH POSITIVE HIGH RISK HPV CERVICAL: Primary | ICD-10-CM

## 2022-02-22 PROCEDURE — 87210 SMEAR WET MOUNT SALINE/INK: CPT | Performed by: NURSE PRACTITIONER

## 2022-02-22 PROCEDURE — 99213 OFFICE O/P EST LOW 20 MIN: CPT | Performed by: NURSE PRACTITIONER

## 2022-02-22 RX ORDER — METRONIDAZOLE 500 MG/1
500 TABLET ORAL 2 TIMES DAILY
Qty: 14 TABLET | Refills: 0 | Status: SHIPPED | OUTPATIENT
Start: 2022-02-22 | End: 2022-03-01

## 2022-02-22 NOTE — PROGRESS NOTES
"Problem Visit     Patient Name: Starr Ruiz  : 1980   MRN: [id number]   Care Team: Patient Care Team:  Nahed Molina MD as PCP - General (Family Medicine)    Chief Complaint:    Chief Complaint   Patient presents with   • Follow-up     repeat pap   • Vaginitis     c/o vaginal itching and inflammation.  no discharge or odor.       HPI: Starr Ruiz is a 41 y.o. year old  presenting to be seen for 4 month f/u rpt pap smear.   AV done in 2021   Pap smear returned with ASCUS positive non 16/18 HR HPV   Colpo with Dr. Charla murray - unable to visualize cervix   Per his note, she needs rpt pap smear today     S/p BTL and endometrial ablation     Reports intense itching at the vaginal introitus x 3 days   No pelvic pain or dyspareunia or postcoital bldg   States she has had a new partner and unprotected intercourse so she is concerned   She also gets frequent yeast infxs and she did take multiple bubble baths a couple of wks ago       Subjective      /88   Ht 175.3 cm (69\")   Wt (!) 171 kg (378 lb)   LMP  (LMP Unknown)   Breastfeeding No   BMI 55.82 kg/m²     BMI reviewed: Body mass index is 55.82 kg/m².      Objective     Physical Exam      Neuro: alert and oriented to person, place and time   General:  alert; cooperative; well developed; well nourished   Skin:  Not performed.   Thyroid: not examined   Lungs:  breathing is unlabored   Heart:  Not performed.   Breasts:  Not performed.   Abdomen: Not performed.   Pelvis: Clinical staff was present for exam  External genitalia:  normal appearance of the external genitalia including Bartholin's and [place]'s glands.  :  urethral meatus normal;  Vaginal:  discharge present -  white; wet prep done: pseudo-hyphae are present and trichomonads are present; diffuse vaginal erythema with strawberry spots noted  Cervix:  normal appearance.  Uterus:  not palpable.  Adnexa:  non palpable bilaterally.  Rectal:  digital " rectal exam not performed; anus visually normal appearing.     Bimanual exam limited d/t body habitus    Assessment / Plan      Assessment  Problems Addressed This Visit    ICD-10-CM ICD-9-CM   1. ASCUS with positive high risk HPV cervical  R87.610 795.01    R87.810 795.05   2. Possible exposure to STD  Z20.2 V01.6   3. Trichomonal vaginitis  A59.01 131.01   4. Candidiasis, vagina  B37.3 112.1       Plan    Reviewed pap smear results and Dr. Dunham's recommendation for rpt pap d/t inadequate colpo   Pap smear and STI screening pending     Wet mount = trichomoniasis and yeast   Flagyl 500mg BID x 7 days - stressed importance of completing medication and no intercourse during txment   Partner must be tested and treated as well   Terazol cream to be used at the same time   Stressed condom use   Will call with results     AV scheduled when due in Sept           Follow Up  Return for Next scheduled follow up.  Patient was given instructions and counseling regarding her condition or for health maintenance advice. Please see specific information pulled into the AVS if appropriate.     Sarah Valentin, APRN  February 22, 2022  15:27 EST

## 2022-02-24 RX ORDER — DOXYCYCLINE 100 MG/1
100 CAPSULE ORAL 2 TIMES DAILY
Qty: 14 CAPSULE | Refills: 0 | Status: SHIPPED | OUTPATIENT
Start: 2022-02-24 | End: 2022-03-03

## 2022-03-27 DIAGNOSIS — F41.9 ANXIETY: ICD-10-CM

## 2022-03-27 DIAGNOSIS — I10 ESSENTIAL HYPERTENSION: Chronic | ICD-10-CM

## 2022-03-27 DIAGNOSIS — G47.00 INSOMNIA, UNSPECIFIED TYPE: ICD-10-CM

## 2022-03-28 RX ORDER — LOSARTAN POTASSIUM 100 MG/1
100 TABLET ORAL DAILY
Qty: 30 TABLET | Refills: 0 | Status: SHIPPED | OUTPATIENT
Start: 2022-03-28 | End: 2022-05-17 | Stop reason: SDUPTHER

## 2022-03-28 RX ORDER — HYDROCHLOROTHIAZIDE 12.5 MG/1
12.5 TABLET ORAL DAILY
Qty: 30 TABLET | Refills: 0 | Status: SHIPPED | OUTPATIENT
Start: 2022-03-28 | End: 2022-05-17 | Stop reason: SDUPTHER

## 2022-03-28 RX ORDER — TRAZODONE HYDROCHLORIDE 50 MG/1
150 TABLET ORAL NIGHTLY
Qty: 30 TABLET | Refills: 0 | Status: SHIPPED | OUTPATIENT
Start: 2022-03-28 | End: 2022-05-04 | Stop reason: SDUPTHER

## 2022-03-28 RX ORDER — ESCITALOPRAM OXALATE 10 MG/1
10 TABLET ORAL DAILY
Qty: 30 TABLET | Refills: 0 | Status: SHIPPED | OUTPATIENT
Start: 2022-03-28 | End: 2022-05-17 | Stop reason: SDUPTHER

## 2022-05-04 DIAGNOSIS — G47.00 INSOMNIA, UNSPECIFIED TYPE: ICD-10-CM

## 2022-05-05 RX ORDER — TRAZODONE HYDROCHLORIDE 50 MG/1
150 TABLET ORAL NIGHTLY
Qty: 30 TABLET | Refills: 0 | Status: SHIPPED | OUTPATIENT
Start: 2022-05-05 | End: 2022-05-17 | Stop reason: SDUPTHER

## 2022-05-17 ENCOUNTER — OFFICE VISIT (OUTPATIENT)
Dept: FAMILY MEDICINE CLINIC | Facility: CLINIC | Age: 42
End: 2022-05-17

## 2022-05-17 VITALS
BODY MASS INDEX: 43.4 KG/M2 | RESPIRATION RATE: 18 BRPM | DIASTOLIC BLOOD PRESSURE: 88 MMHG | TEMPERATURE: 98 F | HEIGHT: 69 IN | SYSTOLIC BLOOD PRESSURE: 140 MMHG | OXYGEN SATURATION: 97 % | WEIGHT: 293 LBS | HEART RATE: 84 BPM

## 2022-05-17 DIAGNOSIS — E66.01 MORBID OBESITY DUE TO EXCESS CALORIES: Chronic | ICD-10-CM

## 2022-05-17 DIAGNOSIS — F41.9 ANXIETY: ICD-10-CM

## 2022-05-17 DIAGNOSIS — Z72.0 TOBACCO ABUSE: Chronic | ICD-10-CM

## 2022-05-17 DIAGNOSIS — G47.00 INSOMNIA, UNSPECIFIED TYPE: ICD-10-CM

## 2022-05-17 DIAGNOSIS — R06.83 SNORING: ICD-10-CM

## 2022-05-17 DIAGNOSIS — I10 ESSENTIAL HYPERTENSION: Chronic | ICD-10-CM

## 2022-05-17 DIAGNOSIS — Z00.00 ANNUAL PHYSICAL EXAM: Primary | ICD-10-CM

## 2022-05-17 PROCEDURE — 99396 PREV VISIT EST AGE 40-64: CPT | Performed by: FAMILY MEDICINE

## 2022-05-17 RX ORDER — ESCITALOPRAM OXALATE 10 MG/1
10 TABLET ORAL DAILY
Qty: 90 TABLET | Refills: 3 | Status: SHIPPED | OUTPATIENT
Start: 2022-05-17

## 2022-05-17 RX ORDER — FLUTICASONE PROPIONATE AND SALMETEROL 250; 50 UG/1; UG/1
1 POWDER RESPIRATORY (INHALATION)
Qty: 60 EACH | Refills: 5 | Status: SHIPPED | OUTPATIENT
Start: 2022-05-17 | End: 2022-08-29

## 2022-05-17 RX ORDER — LOSARTAN POTASSIUM 100 MG/1
100 TABLET ORAL DAILY
Qty: 90 TABLET | Refills: 3 | Status: SHIPPED | OUTPATIENT
Start: 2022-05-17

## 2022-05-17 RX ORDER — TRAZODONE HYDROCHLORIDE 50 MG/1
150 TABLET ORAL NIGHTLY
Qty: 90 TABLET | Refills: 3 | Status: SHIPPED | OUTPATIENT
Start: 2022-05-17 | End: 2022-09-30 | Stop reason: SDUPTHER

## 2022-05-17 RX ORDER — ALBUTEROL SULFATE 90 UG/1
2 AEROSOL, METERED RESPIRATORY (INHALATION) EVERY 4 HOURS PRN
Qty: 18 G | Refills: 5 | Status: SHIPPED | OUTPATIENT
Start: 2022-05-17 | End: 2022-10-12

## 2022-05-17 RX ORDER — HYDROCHLOROTHIAZIDE 12.5 MG/1
12.5 TABLET ORAL DAILY
Qty: 90 TABLET | Refills: 3 | Status: SHIPPED | OUTPATIENT
Start: 2022-05-17

## 2022-05-17 NOTE — PROGRESS NOTES
Subjective   Starr Ruiz is a 41 y.o. female and is here for a comprehensive physical exam. The patient reports no problems. Patient reports last physical date of over 1 year ago.    Do you take any herbs or supplements that were not prescribed by a doctor? no  Are you taking calcium supplements? no  Are you taking aspirin daily? no  Family history of ovarian cancer? no  Family history of breast cancer? great aunt  FH of endometrial cancer? no  FH of cervical cancer? no  FH of colon cancer? no    Cancer Screening  Mammogram up-to-date?  yes  If yes, last exam date: needs biopsy for cyst cluster  BMD up-to-date? no  If yes, last exam date: na  Colonoscopy up-to-date? no   If yes, last exam date: na  Pap up-to-date? yes   If yes, last exam date: per gyn     History:  LMP: No LMP recorded. Patient has had an ablation.  Menopause at since ablation 6-7 months years  Last pap date: last year  Abnormal pap? no  : 6  Para: 4    Immunization History  Tdap? 2017  HPV? not applicable  Pneumonia? yes  Shingles? not applicable    The following portions of the patient's history were reviewed and updated as appropriate: allergies, current medications, past family history, past medical history, past social history, past surgical history and problem list.    Past Medical History:   Diagnosis Date   • Anxiety    • Depression    • Hypertension        Family History   Problem Relation Age of Onset   • No Known Problems Mother    • No Known Problems Father    • Breast cancer Other         age unknown    • Endometrial cancer Neg Hx    • Ovarian cancer Neg Hx        Past Surgical History:   Procedure Laterality Date   • D & C HYSTEROSCOPY WITH NOVASURE ENDOMETRIAL ABLATION AND MYOSURE     • LEEP     • TOOTH EXTRACTION     • TUBAL ABDOMINAL LIGATION         Social History     Socioeconomic History   • Marital status: Single   Tobacco Use   • Smoking status: Current Every Day Smoker     Packs/day: 1.00     Years: 25.00      Pack years: 25.00     Types: Cigarettes   • Smokeless tobacco: Never Used   Substance and Sexual Activity   • Alcohol use: Not Currently     Comment: rare   • Drug use: No   • Sexual activity: Yes     Partners: Male     Birth control/protection: None, Surgical       Review of Systems  Do you have pain that bothers you in your daily life? no  Review of Systems   Constitutional: Negative.    HENT: Negative.    Eyes: Negative.    Respiratory: Negative.    Cardiovascular: Negative.    Gastrointestinal: Negative.    Endocrine: Negative.    Genitourinary: Negative.    Musculoskeletal: Negative.    Skin: Negative.    Allergic/Immunologic: Negative.    Neurological: Negative.    Hematological: Negative.    Psychiatric/Behavioral: Negative.    All other systems reviewed and are negative.      Objective   Physical Exam  Vitals and nursing note reviewed.   Constitutional:       Appearance: She is well-developed.   HENT:      Head: Normocephalic and atraumatic.   Eyes:      General:         Right eye: No discharge.         Left eye: No discharge.      Pupils: Pupils are equal, round, and reactive to light.   Cardiovascular:      Rate and Rhythm: Normal rate and regular rhythm.      Heart sounds: Normal heart sounds.   Pulmonary:      Effort: Pulmonary effort is normal.      Breath sounds: Normal breath sounds.   Abdominal:      General: Bowel sounds are normal.      Palpations: Abdomen is soft. There is no mass.      Tenderness: There is no abdominal tenderness.   Musculoskeletal:         General: Normal range of motion.      Right shoulder: No swelling.      Cervical back: Normal range of motion and neck supple.   Skin:     General: Skin is warm and dry.      Nails: There is no clubbing.   Neurological:      Mental Status: She is alert and oriented to person, place, and time.      Deep Tendon Reflexes: Reflexes are normal and symmetric.   Psychiatric:         Behavior: Behavior normal.         Thought Content: Thought  content normal.         Judgment: Judgment normal.          Assessment & Plan   Healthy female exam.      1.   Problem List Items Addressed This Visit        Cardiac and Vasculature    Essential hypertension (Chronic)    Relevant Medications    losartan (COZAAR) 100 MG tablet    hydroCHLOROthiazide (HYDRODIURIL) 12.5 MG tablet       Endocrine and Metabolic    Morbid obesity due to excess calories (HCC) (Chronic)    Relevant Orders    Ambulatory Referral to Sleep Medicine       Health Encounters    Annual physical exam - Primary    Relevant Orders    CBC & Differential    TSH    Comprehensive Metabolic Panel    Hemoglobin A1c    Lipid Panel    Vitamin D 25 Hydroxy    Vitamin B12       Mental Health    Anxiety    Relevant Medications    escitalopram (LEXAPRO) 10 MG tablet       Sleep    Insomnia    Relevant Medications    traZODone (DESYREL) 50 MG tablet       Tobacco    Tobacco abuse (Chronic)    Relevant Medications    albuterol sulfate  (90 Base) MCG/ACT inhaler    Fluticasone-Salmeterol (ADVAIR) 250-50 MCG/ACT DISKUS      Other Visit Diagnoses     Snoring        Relevant Orders    Ambulatory Referral to Sleep Medicine            2. Patient Counseling:  --Nutrition: Stressed importance of moderation in sodium/caffeine intake, saturated fat and cholesterol, caloric balance, sufficient intake of fresh fruits, vegetables, fiber, calcium, iron, and 1 mg of folate supplement per day (for females capable of pregnancy).  --Discussed the issue of estrogen replacement, calcium supplement, and the daily use of baby aspirin.  --Exercise: Stressed the importance of regular exercise.   --Substance Abuse: Discussed cessation/primary prevention of tobacco, alcohol, or other drug use; driving or other dangerous activities under the influence; availability of treatment for abuse.    --Sexuality: Discussed sexually transmitted diseases, partner selection, use of condoms, avoidance of unintended pregnancy  and contraceptive  alternatives.   --Injury prevention: Discussed safety belts, safety helmets, smoke detector, smoking near bedding or upholstery.   --Dental health: Discussed importance of regular tooth brushing, flossing, and dental visits.  --Immunizations reviewed.  --Discussed benefits of screening colonoscopy.  --After hours service discussed with patient    3. Discussed the patient's BMI with her.  The BMI is above average; BMI management plan is completed  4. Follow up in 6 months

## 2022-05-24 ENCOUNTER — OFFICE VISIT (OUTPATIENT)
Dept: OBSTETRICS AND GYNECOLOGY | Facility: CLINIC | Age: 42
End: 2022-05-24

## 2022-05-24 VITALS
BODY MASS INDEX: 53.9 KG/M2 | WEIGHT: 293 LBS | SYSTOLIC BLOOD PRESSURE: 130 MMHG | RESPIRATION RATE: 16 BRPM | DIASTOLIC BLOOD PRESSURE: 80 MMHG

## 2022-05-24 DIAGNOSIS — B37.31 YEAST VAGINITIS: ICD-10-CM

## 2022-05-24 DIAGNOSIS — A59.01 TRICHOMONAS VAGINITIS: Primary | ICD-10-CM

## 2022-05-24 DIAGNOSIS — Z20.2 POSSIBLE EXPOSURE TO STD: ICD-10-CM

## 2022-05-24 DIAGNOSIS — A74.9 CHLAMYDIA INFECTION: ICD-10-CM

## 2022-05-24 PROCEDURE — 99213 OFFICE O/P EST LOW 20 MIN: CPT | Performed by: NURSE PRACTITIONER

## 2022-05-24 PROCEDURE — 87210 SMEAR WET MOUNT SALINE/INK: CPT | Performed by: NURSE PRACTITIONER

## 2022-05-24 RX ORDER — SECNIDAZOLE 2 G/4.8G
2 GRANULE ORAL DAILY
Qty: 1 EACH | Refills: 0 | Status: SHIPPED | OUTPATIENT
Start: 2022-05-24 | End: 2022-05-25 | Stop reason: ALTCHOICE

## 2022-05-24 RX ORDER — NYSTATIN 100000 U/G
1 OINTMENT TOPICAL 2 TIMES DAILY
Qty: 30 G | Refills: 3 | Status: SHIPPED | OUTPATIENT
Start: 2022-05-24 | End: 2022-06-07

## 2022-05-24 RX ORDER — FLUCONAZOLE 150 MG/1
TABLET ORAL
Qty: 3 TABLET | Refills: 0 | Status: SHIPPED | OUTPATIENT
Start: 2022-05-24 | End: 2022-06-30

## 2022-05-24 NOTE — PROGRESS NOTES
Problem Visit     Patient Name: Starr Ruiz  : 1980   MRN: [id number]   Care Team: Patient Care Team:  Nahed Molina MD as PCP - General (Family Medicine)    Chief Complaint:    Chief Complaint   Patient presents with   • Vaginal Itching   • Vaginal Discharge     DESIRES STD CHECK       HPI: Starr Ruiz is a 41 y.o. year old  presenting to be seen with c/o vaginal d/c with itching and irritation.   States it began about 1 wk ago now   She had some terazol cream left over and has used it x 3-4 days - itching has improved but not resolved   Concerned bc she had a new partner recently and did not use a condom     Treated at [facility] in Feb for trichomonas and yeast   No problems with flagyl or terazol cream   Sx completely resolved after txment   Chlamydia screening also positive [facility] - no problems with doxycycline   Hasn't been sexually active with that partner since     S/p BTL and endometrial ablation     AV done 2021      Subjective      /80   Resp 16   Wt (!) 166 kg (365 lb)   Breastfeeding No   BMI 53.90 kg/m²     BMI reviewed: Body mass index is 53.9 kg/m².      Objective     Physical Exam      Neuro: alert and oriented to person, place and time   General:  alert; cooperative; well developed; well nourished   Skin:  Not performed.   Thyroid: not examined   Lungs:  breathing is unlabored   Heart:  Not performed.   Breasts:  Not performed.   Abdomen: Not performed.   Pelvis: Clinical staff was present for exam  External genitalia:  normal appearance of the external genitalia including Bartholin's and [place]'s glands.  :  urethral meatus normal;  Vaginal:  discharge present -  green; wet prep done: pseudo-hyphae are present and trichomonads are present; diffuse erythema   Cervix:  normal appearance.  Uterus:  not palpable.  Adnexa:  non palpable bilaterally.  Rectal:  digital rectal exam not performed; anus visually normal appearing.     Bimanual exam limited  due to body habitus   Erythema and irritation noted in groin     Assessment / Plan      Assessment  Problems Addressed This Visit    ICD-10-CM ICD-9-CM   1. Trichomonas vaginitis  A59.01 131.01   2. Yeast vaginitis  B37.3 112.1   3. Chlamydia infection  A74.9 079.98   4. Possible exposure to STD  Z20.2 V01.6       Plan    Wet mount = trichomonas and yeast   Discussed txment and prevention   Solosec and diflucan to pharmacy - she prefers short txment, has difficulty remembering flagyl x 7 days   Stressed importance of faithful condom use - she will contact partner   No intercourse x 1 wk and until partner has been tested and treated   Oneswab pending for leukorrhea panel   Will call with results and to f/u     States she does develop irritation and itching in groin and in panus   Nystatin ointment prn    Keep area as dry and clean as possible     AV due Sept 2022           Follow Up  Return for Next scheduled follow up.  Patient was given instructions and counseling regarding her condition or for health maintenance advice. Please see specific information pulled into the AVS if appropriate.     Sarah Valentin, APRN  May 24, 2022  14:51 EDT

## 2022-05-25 ENCOUNTER — TELEPHONE (OUTPATIENT)
Dept: OBSTETRICS AND GYNECOLOGY | Facility: CLINIC | Age: 42
End: 2022-05-25

## 2022-05-25 RX ORDER — METRONIDAZOLE 500 MG/1
500 TABLET ORAL 2 TIMES DAILY
Qty: 14 TABLET | Refills: 0 | Status: SHIPPED | OUTPATIENT
Start: 2022-05-25 | End: 2022-06-01

## 2022-05-25 NOTE — TELEPHONE ENCOUNTER
Pt called and is stating her pharmacy needs a PA for the prescription called in - she is asking can we call in the other one and hopefully they don't need one- please call her if any questions

## 2022-06-19 ENCOUNTER — APPOINTMENT (OUTPATIENT)
Dept: CT IMAGING | Facility: HOSPITAL | Age: 42
End: 2022-06-19

## 2022-06-19 ENCOUNTER — HOSPITAL ENCOUNTER (EMERGENCY)
Facility: HOSPITAL | Age: 42
Discharge: HOME | End: 2022-06-19
Attending: EMERGENCY MEDICINE | Admitting: EMERGENCY MEDICINE

## 2022-06-19 VITALS
HEART RATE: 75 BPM | SYSTOLIC BLOOD PRESSURE: 120 MMHG | RESPIRATION RATE: 18 BRPM | OXYGEN SATURATION: 96 % | DIASTOLIC BLOOD PRESSURE: 75 MMHG | BODY MASS INDEX: 43.4 KG/M2 | WEIGHT: 293 LBS | TEMPERATURE: 98.1 F | HEIGHT: 69 IN

## 2022-06-19 DIAGNOSIS — R10.31 RIGHT LOWER QUADRANT ABDOMINAL PAIN: Primary | ICD-10-CM

## 2022-06-19 DIAGNOSIS — N83.201 OVARIAN CYST, RIGHT: ICD-10-CM

## 2022-06-19 LAB
ALBUMIN SERPL-MCNC: 3.9 G/DL (ref 3.5–5.2)
ALBUMIN/GLOB SERPL: 1.3 G/DL
ALP SERPL-CCNC: 84 U/L (ref 39–117)
ALT SERPL W P-5'-P-CCNC: 7 U/L (ref 1–33)
ANION GAP SERPL CALCULATED.3IONS-SCNC: 11 MMOL/L (ref 5–15)
AST SERPL-CCNC: 9 U/L (ref 1–32)
B-HCG UR QL: NEGATIVE
BASOPHILS # BLD AUTO: 0.05 10*3/MM3 (ref 0–0.2)
BASOPHILS NFR BLD AUTO: 0.5 % (ref 0–1.5)
BILIRUB SERPL-MCNC: 0.2 MG/DL (ref 0–1.2)
BILIRUB UR QL STRIP: NEGATIVE
BUN SERPL-MCNC: 10 MG/DL (ref 6–20)
BUN/CREAT SERPL: 14.7 (ref 7–25)
CALCIUM SPEC-SCNC: 9.2 MG/DL (ref 8.6–10.5)
CHLORIDE SERPL-SCNC: 102 MMOL/L (ref 98–107)
CLARITY UR: CLEAR
CO2 SERPL-SCNC: 27 MMOL/L (ref 22–29)
COLOR UR: YELLOW
CREAT SERPL-MCNC: 0.68 MG/DL (ref 0.57–1)
DEPRECATED RDW RBC AUTO: 48.4 FL (ref 37–54)
EGFRCR SERPLBLD CKD-EPI 2021: 112.4 ML/MIN/1.73
EOSINOPHIL # BLD AUTO: 0.32 10*3/MM3 (ref 0–0.4)
EOSINOPHIL NFR BLD AUTO: 3.4 % (ref 0.3–6.2)
ERYTHROCYTE [DISTWIDTH] IN BLOOD BY AUTOMATED COUNT: 13.9 % (ref 12.3–15.4)
EXPIRATION DATE: NORMAL
GLOBULIN UR ELPH-MCNC: 2.9 GM/DL
GLUCOSE SERPL-MCNC: 125 MG/DL (ref 65–99)
GLUCOSE UR STRIP-MCNC: NEGATIVE MG/DL
HCT VFR BLD AUTO: 41.5 % (ref 34–46.6)
HGB BLD-MCNC: 13.9 G/DL (ref 12–15.9)
HGB UR QL STRIP.AUTO: NEGATIVE
HOLD SPECIMEN: NORMAL
IMM GRANULOCYTES # BLD AUTO: 0.11 10*3/MM3 (ref 0–0.05)
IMM GRANULOCYTES NFR BLD AUTO: 1.2 % (ref 0–0.5)
INTERNAL NEGATIVE CONTROL: NEGATIVE
INTERNAL POSITIVE CONTROL: POSITIVE
KETONES UR QL STRIP: NEGATIVE
LEUKOCYTE ESTERASE UR QL STRIP.AUTO: NEGATIVE
LIPASE SERPL-CCNC: 26 U/L (ref 13–60)
LYMPHOCYTES # BLD AUTO: 2.04 10*3/MM3 (ref 0.7–3.1)
LYMPHOCYTES NFR BLD AUTO: 21.9 % (ref 19.6–45.3)
Lab: NORMAL
MCH RBC QN AUTO: 31.8 PG (ref 26.6–33)
MCHC RBC AUTO-ENTMCNC: 33.5 G/DL (ref 31.5–35.7)
MCV RBC AUTO: 95 FL (ref 79–97)
MONOCYTES # BLD AUTO: 0.54 10*3/MM3 (ref 0.1–0.9)
MONOCYTES NFR BLD AUTO: 5.8 % (ref 5–12)
NEUTROPHILS NFR BLD AUTO: 6.25 10*3/MM3 (ref 1.7–7)
NEUTROPHILS NFR BLD AUTO: 67.2 % (ref 42.7–76)
NITRITE UR QL STRIP: NEGATIVE
NRBC BLD AUTO-RTO: 0 /100 WBC (ref 0–0.2)
PH UR STRIP.AUTO: <=5 [PH] (ref 5–8)
PLATELET # BLD AUTO: 237 10*3/MM3 (ref 140–450)
PMV BLD AUTO: 8.9 FL (ref 6–12)
POTASSIUM SERPL-SCNC: 4 MMOL/L (ref 3.5–5.2)
PROT SERPL-MCNC: 6.8 G/DL (ref 6–8.5)
PROT UR QL STRIP: NEGATIVE
RBC # BLD AUTO: 4.37 10*6/MM3 (ref 3.77–5.28)
SODIUM SERPL-SCNC: 140 MMOL/L (ref 136–145)
SP GR UR STRIP: 1.01 (ref 1–1.03)
UROBILINOGEN UR QL STRIP: NORMAL
WBC NRBC COR # BLD: 9.31 10*3/MM3 (ref 3.4–10.8)
WHOLE BLOOD HOLD COAG: NORMAL
WHOLE BLOOD HOLD SPECIMEN: NORMAL

## 2022-06-19 PROCEDURE — 99283 EMERGENCY DEPT VISIT LOW MDM: CPT

## 2022-06-19 PROCEDURE — 83690 ASSAY OF LIPASE: CPT | Performed by: EMERGENCY MEDICINE

## 2022-06-19 PROCEDURE — 25010000002 HYDROMORPHONE PER 4 MG: Performed by: EMERGENCY MEDICINE

## 2022-06-19 PROCEDURE — 25010000002 ONDANSETRON PER 1 MG: Performed by: EMERGENCY MEDICINE

## 2022-06-19 PROCEDURE — 25010000002 IOPAMIDOL 61 % SOLUTION: Performed by: EMERGENCY MEDICINE

## 2022-06-19 PROCEDURE — 96374 THER/PROPH/DIAG INJ IV PUSH: CPT

## 2022-06-19 PROCEDURE — 96375 TX/PRO/DX INJ NEW DRUG ADDON: CPT

## 2022-06-19 PROCEDURE — 85025 COMPLETE CBC W/AUTO DIFF WBC: CPT | Performed by: EMERGENCY MEDICINE

## 2022-06-19 PROCEDURE — 81025 URINE PREGNANCY TEST: CPT | Performed by: EMERGENCY MEDICINE

## 2022-06-19 PROCEDURE — 74177 CT ABD & PELVIS W/CONTRAST: CPT

## 2022-06-19 PROCEDURE — 81003 URINALYSIS AUTO W/O SCOPE: CPT | Performed by: EMERGENCY MEDICINE

## 2022-06-19 PROCEDURE — 80053 COMPREHEN METABOLIC PANEL: CPT | Performed by: EMERGENCY MEDICINE

## 2022-06-19 RX ORDER — ONDANSETRON 4 MG/1
4 TABLET, FILM COATED ORAL EVERY 8 HOURS PRN
Qty: 15 TABLET | Refills: 0 | Status: SHIPPED | OUTPATIENT
Start: 2022-06-19 | End: 2022-06-21 | Stop reason: SDUPTHER

## 2022-06-19 RX ORDER — SODIUM CHLORIDE 9 MG/ML
10 INJECTION INTRAVENOUS AS NEEDED
Status: DISCONTINUED | OUTPATIENT
Start: 2022-06-19 | End: 2022-06-19 | Stop reason: HOSPADM

## 2022-06-19 RX ORDER — ONDANSETRON 2 MG/ML
4 INJECTION INTRAMUSCULAR; INTRAVENOUS ONCE
Status: COMPLETED | OUTPATIENT
Start: 2022-06-19 | End: 2022-06-19

## 2022-06-19 RX ORDER — HYDROMORPHONE HYDROCHLORIDE 1 MG/ML
0.5 INJECTION, SOLUTION INTRAMUSCULAR; INTRAVENOUS; SUBCUTANEOUS ONCE
Status: COMPLETED | OUTPATIENT
Start: 2022-06-19 | End: 2022-06-19

## 2022-06-19 RX ORDER — HYDROCODONE BITARTRATE AND ACETAMINOPHEN 5; 325 MG/1; MG/1
1 TABLET ORAL EVERY 6 HOURS PRN
Qty: 12 TABLET | Refills: 0 | Status: SHIPPED | OUTPATIENT
Start: 2022-06-19 | End: 2022-06-21 | Stop reason: SDUPTHER

## 2022-06-19 RX ADMIN — HYDROMORPHONE HYDROCHLORIDE 0.5 MG: 1 INJECTION, SOLUTION INTRAMUSCULAR; INTRAVENOUS; SUBCUTANEOUS at 13:53

## 2022-06-19 RX ADMIN — IOPAMIDOL 85 ML: 612 INJECTION, SOLUTION INTRAVENOUS at 14:35

## 2022-06-19 RX ADMIN — ONDANSETRON 4 MG: 2 INJECTION INTRAMUSCULAR; INTRAVENOUS at 13:52

## 2022-06-19 RX ADMIN — SODIUM CHLORIDE 500 ML: 9 INJECTION, SOLUTION INTRAVENOUS at 13:50

## 2022-06-19 NOTE — ED PROVIDER NOTES
Subjective   41-year-old female presents for evaluation of right lower quadrant abdominal pain for the past 5 days.  She also endorses accompanying nausea and anorexia as a result of the pain.  She currently rates her pain at 6 out of 10 in severity.  The pain is worse with palpation.  She denies any rash or urinary symptoms.  No vomiting.  No diarrhea.  The pain is intermittent in nature.          Review of Systems   Constitutional: Positive for appetite change.   Gastrointestinal: Positive for abdominal pain and nausea.   All other systems reviewed and are negative.      Past Medical History:   Diagnosis Date   • Anxiety    • Depression    • Hypertension        Allergies   Allergen Reactions   • Lisinopril Cough       Past Surgical History:   Procedure Laterality Date   • D & C HYSTEROSCOPY WITH NOVASURE ENDOMETRIAL ABLATION AND MYOSURE     • LEEP     • TOOTH EXTRACTION     • TUBAL ABDOMINAL LIGATION         Family History   Problem Relation Age of Onset   • No Known Problems Mother    • No Known Problems Father    • Breast cancer Other         age unknown    • Endometrial cancer Neg Hx    • Ovarian cancer Neg Hx        Social History     Socioeconomic History   • Marital status: Single   Tobacco Use   • Smoking status: Current Every Day Smoker     Packs/day: 1.00     Years: 25.00     Pack years: 25.00     Types: Cigarettes   • Smokeless tobacco: Never Used   Substance and Sexual Activity   • Alcohol use: Not Currently     Comment: rare   • Drug use: No   • Sexual activity: Yes     Partners: Male     Birth control/protection: None, Surgical           Objective   Physical Exam  Vitals and nursing note reviewed.   Constitutional:       General: She is not in acute distress.     Appearance: She is well-developed. She is obese. She is not diaphoretic.      Comments: Nontoxic-appearing female   HENT:      Head: Normocephalic and atraumatic.   Eyes:      Pupils: Pupils are equal, round, and reactive to light.    Cardiovascular:      Rate and Rhythm: Normal rate and regular rhythm.      Heart sounds: Normal heart sounds. No murmur heard.    No friction rub. No gallop.   Pulmonary:      Effort: Pulmonary effort is normal. No respiratory distress.      Breath sounds: Normal breath sounds. No wheezing or rales.   Abdominal:      General: Bowel sounds are normal. There is no distension.      Palpations: Abdomen is soft. There is no mass.      Tenderness: There is abdominal tenderness. There is guarding. There is no rebound.      Comments: Focal right lower quadrant tenderness with guarding noted, no pain out of proportion to exam, negative Rovsing's sign   Genitourinary:     Comments: No CVA tenderness noted  Musculoskeletal:         General: Normal range of motion.      Cervical back: Neck supple.   Skin:     General: Skin is warm and dry.      Findings: No erythema or rash.      Comments: No dermatomal rash noted right flank   Neurological:      General: No focal deficit present.      Mental Status: She is alert and oriented to person, place, and time.      Comments: Normal gait   Psychiatric:         Mood and Affect: Mood normal.         Thought Content: Thought content normal.         Judgment: Judgment normal.         Procedures           ED Course  ED Course as of 06/19/22 1525   Sun Jun 19, 2022   1338 41-year-old female presents for evaluation of right lower quadrant abdominal pain for the past 5 days.  On arrival to the ED, the patient is nontoxic-appearing.  Exam remarkable for mild right lower quadrant tenderness with guarding noted.  No pain out of proportion to exam.  No CVA tenderness noted.  No dermatomal rash present.  We will obtain labs and imaging, and we will reassess following initial interventions. [DD]   1409 Labs are bland. [DD]   1510 Upon reevaluation, the patient looks and feels well.  Her pain is well controlled.  After reviewing her CT scan, I do not feel that her clinical presentation is  consistent with ovarian torsion.  Doubt emergent intra-abdominal or pelvic process at this time based on exam, history, imaging, and gestalt.  Patient reassured.  Short course of Norco given for pain control as well as Zofran as needed for nausea.  She will follow-up with her OB/GYN this week.  Agreeable with plan and given appropriate strict return precautions. [DD]      ED Course User Index  [DD] Rashaad Lucas MD                                         Recent Results (from the past 24 hour(s))   Comprehensive Metabolic Panel    Collection Time: 06/19/22  1:27 PM    Specimen: Blood   Result Value Ref Range    Glucose 125 (H) 65 - 99 mg/dL    BUN 10 6 - 20 mg/dL    Creatinine 0.68 0.57 - 1.00 mg/dL    Sodium 140 136 - 145 mmol/L    Potassium 4.0 3.5 - 5.2 mmol/L    Chloride 102 98 - 107 mmol/L    CO2 27.0 22.0 - 29.0 mmol/L    Calcium 9.2 8.6 - 10.5 mg/dL    Total Protein 6.8 6.0 - 8.5 g/dL    Albumin 3.90 3.50 - 5.20 g/dL    ALT (SGPT) 7 1 - 33 U/L    AST (SGOT) 9 1 - 32 U/L    Alkaline Phosphatase 84 39 - 117 U/L    Total Bilirubin 0.2 0.0 - 1.2 mg/dL    Globulin 2.9 gm/dL    A/G Ratio 1.3 g/dL    BUN/Creatinine Ratio 14.7 7.0 - 25.0    Anion Gap 11.0 5.0 - 15.0 mmol/L    eGFR 112.4 >60.0 mL/min/1.73   Lipase    Collection Time: 06/19/22  1:27 PM    Specimen: Blood   Result Value Ref Range    Lipase 26 13 - 60 U/L   Green Top (Gel)    Collection Time: 06/19/22  1:27 PM   Result Value Ref Range    Extra Tube Hold for add-ons.    Lavender Top    Collection Time: 06/19/22  1:27 PM   Result Value Ref Range    Extra Tube hold for add-on    Gold Top - SST    Collection Time: 06/19/22  1:27 PM   Result Value Ref Range    Extra Tube Hold for add-ons.    Light Blue Top    Collection Time: 06/19/22  1:27 PM   Result Value Ref Range    Extra Tube Hold for add-ons.    CBC Auto Differential    Collection Time: 06/19/22  1:27 PM    Specimen: Blood   Result Value Ref Range    WBC 9.31 3.40 - 10.80 10*3/mm3    RBC 4.37  3.77 - 5.28 10*6/mm3    Hemoglobin 13.9 12.0 - 15.9 g/dL    Hematocrit 41.5 34.0 - 46.6 %    MCV 95.0 79.0 - 97.0 fL    MCH 31.8 26.6 - 33.0 pg    MCHC 33.5 31.5 - 35.7 g/dL    RDW 13.9 12.3 - 15.4 %    RDW-SD 48.4 37.0 - 54.0 fl    MPV 8.9 6.0 - 12.0 fL    Platelets 237 140 - 450 10*3/mm3    Neutrophil % 67.2 42.7 - 76.0 %    Lymphocyte % 21.9 19.6 - 45.3 %    Monocyte % 5.8 5.0 - 12.0 %    Eosinophil % 3.4 0.3 - 6.2 %    Basophil % 0.5 0.0 - 1.5 %    Immature Grans % 1.2 (H) 0.0 - 0.5 %    Neutrophils, Absolute 6.25 1.70 - 7.00 10*3/mm3    Lymphocytes, Absolute 2.04 0.70 - 3.10 10*3/mm3    Monocytes, Absolute 0.54 0.10 - 0.90 10*3/mm3    Eosinophils, Absolute 0.32 0.00 - 0.40 10*3/mm3    Basophils, Absolute 0.05 0.00 - 0.20 10*3/mm3    Immature Grans, Absolute 0.11 (H) 0.00 - 0.05 10*3/mm3    nRBC 0.0 0.0 - 0.2 /100 WBC   Urinalysis With Microscopic If Indicated (No Culture) - Urine, Clean Catch    Collection Time: 06/19/22  1:55 PM    Specimen: Urine, Clean Catch   Result Value Ref Range    Color, UA Yellow Yellow, Straw    Appearance, UA Clear Clear    pH, UA <=5.0 5.0 - 8.0    Specific Gravity, UA 1.010 1.001 - 1.030    Glucose, UA Negative Negative    Ketones, UA Negative Negative    Bilirubin, UA Negative Negative    Blood, UA Negative Negative    Protein, UA Negative Negative    Leuk Esterase, UA Negative Negative    Nitrite, UA Negative Negative    Urobilinogen, UA 0.2 E.U./dL 0.2 - 1.0 E.U./dL   POC Pregnancy, Urine    Collection Time: 06/19/22  1:58 PM    Specimen: Urine   Result Value Ref Range    HCG, Urine, QL Negative Negative    Lot Number [id number]     Internal Positive Control Positive Positive, Passed    Internal Negative Control Negative Negative, Passed    Expiration Date 7/2,023      Note: In addition to lab results from this visit, the labs listed above may include labs taken at another facility or during a different encounter within the last 24 hours. Please correlate lab times with ED  "admission and discharge times for further clarification of the services performed during this visit.    CT Abdomen Pelvis With Contrast   Final Result   No acute abdominopelvic findings. Normal appendix.       Fibroid uterus.       Two small right adnexal cystic structure may reflect ovarian follicles   or small cysts.       This report was finalized on 6/19/2022 3:02 PM by Randall Morrison MD.            Vitals:    06/19/22 1322 06/19/22 1338 06/19/22 1339 06/19/22 1400   BP: (!) 172/112 138/80  120/75   Pulse: 75      Resp: 18      Temp: 98.1 °F (36.7 °C)      TempSrc: Oral      SpO2: 97%  96% 96%   Weight: (!) 168 kg (370 lb)      Height: 175.3 cm (69\")        Medications   Sodium Chloride (PF) 0.9 % 10 mL (has no administration in time range)   sodium chloride 0.9 % bolus 500 mL (0 mL Intravenous Stopped 6/19/22 1513)   ondansetron (ZOFRAN) injection 4 mg (4 mg Intravenous Given 6/19/22 1352)   HYDROmorphone (DILAUDID) injection 0.5 mg (0.5 mg Intravenous Given 6/19/22 1353)   iopamidol (ISOVUE-300) 61 % injection 85 mL (85 mL Intravenous Given 6/19/22 1435)     ECG/EMG Results (last 24 hours)     ** No results found for the last 24 hours. **        No orders to display               MDM    Final diagnoses:   Right lower quadrant abdominal pain   Ovarian cyst, right       ED Disposition  ED Disposition     ED Disposition   Discharge    Condition   Stable    Comment   --             No follow-up provider specified.       Medication List      New Prescriptions    HYDROcodone-acetaminophen 5-325 MG per tablet  Commonly known as: NORCO  Take 1 tablet by mouth Every 6 (Six) Hours As Needed for Moderate Pain .     ondansetron 4 MG tablet  Commonly known as: ZOFRAN  Take 1 tablet by mouth Every 8 (Eight) Hours As Needed for Nausea.           Where to Get Your Medications      These medications were sent to [facility] [address] [place] [address] [facility] [phone] [facility] [phone]   [address] [place] [address]    Phone: [phone]   · HYDROcodone-acetaminophen 5-325 MG per tablet  · ondansetron 4 MG tablet          Rashaad Lucas MD  06/19/22 0049

## 2022-06-20 ENCOUNTER — TELEPHONE (OUTPATIENT)
Dept: OBSTETRICS AND GYNECOLOGY | Facility: CLINIC | Age: 42
End: 2022-06-20

## 2022-06-20 DIAGNOSIS — R10.2 PELVIC PAIN: Primary | ICD-10-CM

## 2022-06-20 DIAGNOSIS — N83.201 RIGHT OVARIAN CYST: ICD-10-CM

## 2022-06-20 NOTE — TELEPHONE ENCOUNTER
Louise,  Please let her know that I have put a pelvic u/s order in, so we need to do that for further evaluation of the right ovarian cyst.  And we can see her in the office to do an exam - she can do both same day if she would like.  She can keep taking the pain medicine from the ER prn and if her pain becomes severe again, she would need to go back to the ER.     Thanks!

## 2022-06-20 NOTE — TELEPHONE ENCOUNTER
ER visit was at [facility], had CT scan and given norco.  CT scan results in chart.  Patient states pain is bad.

## 2022-06-20 NOTE — TELEPHONE ENCOUNTER
PT CALLED AND LACI STATED SHE WAS SEEN INT HE ER LAST NIGHT - AND YOU CAN SEE IN HER FILE - SHE IS NOW CALLING AND STATES SHE IS IN HORRIBLE PAIN - WHAT CAN SHE DO ABOUT THE PAIN? PLEASE CALL HER

## 2022-06-21 ENCOUNTER — HOSPITAL ENCOUNTER (EMERGENCY)
Facility: HOSPITAL | Age: 42
Discharge: HOME | End: 2022-06-21
Attending: EMERGENCY MEDICINE | Admitting: EMERGENCY MEDICINE

## 2022-06-21 ENCOUNTER — OFFICE VISIT (OUTPATIENT)
Dept: OBSTETRICS AND GYNECOLOGY | Facility: CLINIC | Age: 42
End: 2022-06-21

## 2022-06-21 ENCOUNTER — APPOINTMENT (OUTPATIENT)
Dept: CT IMAGING | Facility: HOSPITAL | Age: 42
End: 2022-06-21

## 2022-06-21 VITALS
DIASTOLIC BLOOD PRESSURE: 92 MMHG | TEMPERATURE: 98.4 F | WEIGHT: 293 LBS | OXYGEN SATURATION: 92 % | HEIGHT: 69 IN | SYSTOLIC BLOOD PRESSURE: 165 MMHG | BODY MASS INDEX: 43.4 KG/M2 | RESPIRATION RATE: 20 BRPM | HEART RATE: 62 BPM

## 2022-06-21 VITALS
BODY MASS INDEX: 55.53 KG/M2 | SYSTOLIC BLOOD PRESSURE: 128 MMHG | DIASTOLIC BLOOD PRESSURE: 80 MMHG | WEIGHT: 293 LBS | RESPIRATION RATE: 16 BRPM

## 2022-06-21 DIAGNOSIS — R10.31 RIGHT LOWER QUADRANT PAIN: Primary | ICD-10-CM

## 2022-06-21 DIAGNOSIS — R10.31 RIGHT LOWER QUADRANT ABDOMINAL PAIN: ICD-10-CM

## 2022-06-21 DIAGNOSIS — N83.9 LESION OF RIGHT OVARY: Primary | ICD-10-CM

## 2022-06-21 DIAGNOSIS — N83.201 OVARIAN CYST, RIGHT: ICD-10-CM

## 2022-06-21 DIAGNOSIS — D25.9 UTERINE LEIOMYOMA, UNSPECIFIED LOCATION: ICD-10-CM

## 2022-06-21 DIAGNOSIS — A59.01 TRICHOMONAL VAGINITIS: ICD-10-CM

## 2022-06-21 LAB
ALBUMIN SERPL-MCNC: 3.9 G/DL (ref 3.5–5.2)
ALBUMIN/GLOB SERPL: 1.3 G/DL
ALP SERPL-CCNC: 85 U/L (ref 39–117)
ALT SERPL W P-5'-P-CCNC: 8 U/L (ref 1–33)
ANION GAP SERPL CALCULATED.3IONS-SCNC: 9 MMOL/L (ref 5–15)
AST SERPL-CCNC: 10 U/L (ref 1–32)
B-HCG UR QL: NEGATIVE
BASOPHILS # BLD AUTO: 0.08 10*3/MM3 (ref 0–0.2)
BASOPHILS NFR BLD AUTO: 0.8 % (ref 0–1.5)
BILIRUB SERPL-MCNC: 0.2 MG/DL (ref 0–1.2)
BILIRUB UR QL STRIP: NEGATIVE
BUN SERPL-MCNC: 8 MG/DL (ref 6–20)
BUN/CREAT SERPL: 11.8 (ref 7–25)
CALCIUM SPEC-SCNC: 8.8 MG/DL (ref 8.6–10.5)
CHLORIDE SERPL-SCNC: 101 MMOL/L (ref 98–107)
CLARITY UR: CLEAR
CO2 SERPL-SCNC: 28 MMOL/L (ref 22–29)
COLOR UR: YELLOW
CREAT SERPL-MCNC: 0.68 MG/DL (ref 0.57–1)
DEPRECATED RDW RBC AUTO: 47.3 FL (ref 37–54)
EGFRCR SERPLBLD CKD-EPI 2021: 112.4 ML/MIN/1.73
EOSINOPHIL # BLD AUTO: 0.34 10*3/MM3 (ref 0–0.4)
EOSINOPHIL NFR BLD AUTO: 3.3 % (ref 0.3–6.2)
ERYTHROCYTE [DISTWIDTH] IN BLOOD BY AUTOMATED COUNT: 13.6 % (ref 12.3–15.4)
EXPIRATION DATE: NORMAL
GLOBULIN UR ELPH-MCNC: 3.1 GM/DL
GLUCOSE SERPL-MCNC: 86 MG/DL (ref 65–99)
GLUCOSE UR STRIP-MCNC: NEGATIVE MG/DL
HCT VFR BLD AUTO: 40.9 % (ref 34–46.6)
HGB BLD-MCNC: 13.7 G/DL (ref 12–15.9)
HGB UR QL STRIP.AUTO: NEGATIVE
HOLD SPECIMEN: NORMAL
IMM GRANULOCYTES # BLD AUTO: 0.22 10*3/MM3 (ref 0–0.05)
IMM GRANULOCYTES NFR BLD AUTO: 2.2 % (ref 0–0.5)
INTERNAL NEGATIVE CONTROL: NEGATIVE
INTERNAL POSITIVE CONTROL: POSITIVE
KETONES UR QL STRIP: NEGATIVE
LEUKOCYTE ESTERASE UR QL STRIP.AUTO: NEGATIVE
LIPASE SERPL-CCNC: 19 U/L (ref 13–60)
LYMPHOCYTES # BLD AUTO: 2.11 10*3/MM3 (ref 0.7–3.1)
LYMPHOCYTES NFR BLD AUTO: 20.6 % (ref 19.6–45.3)
Lab: NORMAL
MCH RBC QN AUTO: 31.7 PG (ref 26.6–33)
MCHC RBC AUTO-ENTMCNC: 33.5 G/DL (ref 31.5–35.7)
MCV RBC AUTO: 94.7 FL (ref 79–97)
MONOCYTES # BLD AUTO: 0.44 10*3/MM3 (ref 0.1–0.9)
MONOCYTES NFR BLD AUTO: 4.3 % (ref 5–12)
NEUTROPHILS NFR BLD AUTO: 68.8 % (ref 42.7–76)
NEUTROPHILS NFR BLD AUTO: 7.03 10*3/MM3 (ref 1.7–7)
NITRITE UR QL STRIP: NEGATIVE
NRBC BLD AUTO-RTO: 0 /100 WBC (ref 0–0.2)
PH UR STRIP.AUTO: 5.5 [PH] (ref 5–8)
PLATELET # BLD AUTO: 256 10*3/MM3 (ref 140–450)
PMV BLD AUTO: 8.8 FL (ref 6–12)
POTASSIUM SERPL-SCNC: 4.1 MMOL/L (ref 3.5–5.2)
PROT SERPL-MCNC: 7 G/DL (ref 6–8.5)
PROT UR QL STRIP: NEGATIVE
RBC # BLD AUTO: 4.32 10*6/MM3 (ref 3.77–5.28)
SODIUM SERPL-SCNC: 138 MMOL/L (ref 136–145)
SP GR UR STRIP: 1.01 (ref 1–1.03)
UROBILINOGEN UR QL STRIP: NORMAL
WBC NRBC COR # BLD: 10.22 10*3/MM3 (ref 3.4–10.8)
WHOLE BLOOD HOLD COAG: NORMAL
WHOLE BLOOD HOLD SPECIMEN: NORMAL

## 2022-06-21 PROCEDURE — 81025 URINE PREGNANCY TEST: CPT

## 2022-06-21 PROCEDURE — 25010000002 IOPAMIDOL 61 % SOLUTION: Performed by: EMERGENCY MEDICINE

## 2022-06-21 PROCEDURE — 96375 TX/PRO/DX INJ NEW DRUG ADDON: CPT

## 2022-06-21 PROCEDURE — 25010000002 HYDROMORPHONE PER 4 MG: Performed by: EMERGENCY MEDICINE

## 2022-06-21 PROCEDURE — 25010000002 KETOROLAC TROMETHAMINE PER 15 MG: Performed by: PHYSICIAN ASSISTANT

## 2022-06-21 PROCEDURE — 80053 COMPREHEN METABOLIC PANEL: CPT

## 2022-06-21 PROCEDURE — 81003 URINALYSIS AUTO W/O SCOPE: CPT

## 2022-06-21 PROCEDURE — 36415 COLL VENOUS BLD VENIPUNCTURE: CPT

## 2022-06-21 PROCEDURE — 25010000002 MORPHINE PER 10 MG: Performed by: EMERGENCY MEDICINE

## 2022-06-21 PROCEDURE — 99213 OFFICE O/P EST LOW 20 MIN: CPT | Performed by: NURSE PRACTITIONER

## 2022-06-21 PROCEDURE — 99284 EMERGENCY DEPT VISIT MOD MDM: CPT

## 2022-06-21 PROCEDURE — 25010000002 ONDANSETRON PER 1 MG: Performed by: EMERGENCY MEDICINE

## 2022-06-21 PROCEDURE — 83690 ASSAY OF LIPASE: CPT

## 2022-06-21 PROCEDURE — 96374 THER/PROPH/DIAG INJ IV PUSH: CPT

## 2022-06-21 PROCEDURE — 99283 EMERGENCY DEPT VISIT LOW MDM: CPT

## 2022-06-21 PROCEDURE — 25010000002 METHYLPREDNISOLONE PER 125 MG: Performed by: PHYSICIAN ASSISTANT

## 2022-06-21 PROCEDURE — 74177 CT ABD & PELVIS W/CONTRAST: CPT

## 2022-06-21 PROCEDURE — 85025 COMPLETE CBC W/AUTO DIFF WBC: CPT

## 2022-06-21 RX ORDER — IBUPROFEN 800 MG/1
800 TABLET ORAL EVERY 8 HOURS PRN
Qty: 30 TABLET | Refills: 0 | Status: SHIPPED | OUTPATIENT
Start: 2022-06-21 | End: 2022-06-23

## 2022-06-21 RX ORDER — ONDANSETRON 4 MG/1
4 TABLET, FILM COATED ORAL EVERY 8 HOURS PRN
Qty: 15 TABLET | Refills: 0 | Status: SHIPPED | OUTPATIENT
Start: 2022-06-21 | End: 2022-08-29

## 2022-06-21 RX ORDER — HYDROCODONE BITARTRATE AND ACETAMINOPHEN 5; 325 MG/1; MG/1
1 TABLET ORAL EVERY 6 HOURS PRN
Qty: 12 TABLET | Refills: 0 | Status: SHIPPED | OUTPATIENT
Start: 2022-06-21 | End: 2022-06-27 | Stop reason: SDUPTHER

## 2022-06-21 RX ORDER — KETOROLAC TROMETHAMINE 15 MG/ML
15 INJECTION, SOLUTION INTRAMUSCULAR; INTRAVENOUS ONCE
Status: COMPLETED | OUTPATIENT
Start: 2022-06-21 | End: 2022-06-21

## 2022-06-21 RX ORDER — METHYLPREDNISOLONE SODIUM SUCCINATE 125 MG/2ML
125 INJECTION, POWDER, LYOPHILIZED, FOR SOLUTION INTRAMUSCULAR; INTRAVENOUS ONCE
Status: COMPLETED | OUTPATIENT
Start: 2022-06-21 | End: 2022-06-21

## 2022-06-21 RX ORDER — HYDROMORPHONE HYDROCHLORIDE 1 MG/ML
0.5 INJECTION, SOLUTION INTRAMUSCULAR; INTRAVENOUS; SUBCUTANEOUS ONCE
Status: COMPLETED | OUTPATIENT
Start: 2022-06-21 | End: 2022-06-21

## 2022-06-21 RX ORDER — MORPHINE SULFATE 4 MG/ML
4 INJECTION, SOLUTION INTRAMUSCULAR; INTRAVENOUS ONCE
Status: COMPLETED | OUTPATIENT
Start: 2022-06-21 | End: 2022-06-21

## 2022-06-21 RX ORDER — METHYLPREDNISOLONE 4 MG/1
TABLET ORAL
Qty: 1 EACH | Refills: 0 | Status: SHIPPED | OUTPATIENT
Start: 2022-06-21 | End: 2022-06-30

## 2022-06-21 RX ORDER — SODIUM CHLORIDE 9 MG/ML
10 INJECTION INTRAVENOUS AS NEEDED
Status: DISCONTINUED | OUTPATIENT
Start: 2022-06-21 | End: 2022-06-21 | Stop reason: HOSPADM

## 2022-06-21 RX ORDER — ONDANSETRON 2 MG/ML
4 INJECTION INTRAMUSCULAR; INTRAVENOUS ONCE
Status: COMPLETED | OUTPATIENT
Start: 2022-06-21 | End: 2022-06-21

## 2022-06-21 RX ADMIN — KETOROLAC TROMETHAMINE 15 MG: 15 INJECTION, SOLUTION INTRAMUSCULAR; INTRAVENOUS at 17:56

## 2022-06-21 RX ADMIN — ONDANSETRON 4 MG: 2 INJECTION INTRAMUSCULAR; INTRAVENOUS at 15:40

## 2022-06-21 RX ADMIN — SODIUM CHLORIDE 1000 ML: 9 INJECTION, SOLUTION INTRAVENOUS at 15:39

## 2022-06-21 RX ADMIN — HYDROMORPHONE HYDROCHLORIDE 0.5 MG: 1 INJECTION, SOLUTION INTRAMUSCULAR; INTRAVENOUS; SUBCUTANEOUS at 17:03

## 2022-06-21 RX ADMIN — METHYLPREDNISOLONE SODIUM SUCCINATE 125 MG: 125 INJECTION, POWDER, FOR SOLUTION INTRAMUSCULAR; INTRAVENOUS at 17:56

## 2022-06-21 RX ADMIN — MORPHINE SULFATE 4 MG: 4 INJECTION, SOLUTION INTRAMUSCULAR; INTRAVENOUS at 15:39

## 2022-06-21 RX ADMIN — IOPAMIDOL 95 ML: 612 INJECTION, SOLUTION INTRAVENOUS at 16:15

## 2022-06-21 NOTE — PROGRESS NOTES
Problem Visit     Patient Name: Starr Ruiz  : 1980   MRN: [id number]   Care Team: Patient Care Team:  Nahed Molina MD as PCP - General (Family Medicine)  Sarah Valentin APRN as Nurse Practitioner (Nurse Practitioner)    Chief Complaint:    Chief Complaint   Patient presents with   • Abdominal Pain     U/S done today, pain RLQ pain, in back and down leg       HPI: Starr Ruiz is a 41 y.o. year old  presenting to be seen with c/o RLQ pain.   States has been present x 1 wk now and gradually worsening.    At first, Ibuprofen was helpful, now it is not   She also took Norco 5mg that she received in the ER and it was not helpful, not even with 2 tablets last night   Nausea and decreased appetite are present   No diarrhea or constipation   Vomiting   No urinary or vaginal c/o   Fever last night of 102   Positive trichomonas at [facility] - needs KENY today     S/p BTL in    Endometrial ablation in approx  - amenorrhea since     Seen in ER 2 days ago   WBC and UA WNL there   Urine PT negative   CT abdomen and pelvis unremarkable except for possible right ovarian cyst vs follicle measuring 2.6cm in size     Pelvic u/s done today with intramural fibroid noted 6cm size   Left or right ovary not visualized   No fluid in CDS       Subjective      /80   Resp 16   Wt (!) 171 kg (376 lb)   Breastfeeding No   BMI 55.53 kg/m²     BMI reviewed: Body mass index is 55.53 kg/m².      Objective     Physical Exam      Neuro: alert and oriented to person, place and time   General:  alert; cooperative; well developed; well nourished   Skin:  Not performed.   Thyroid: not examined   Lungs:  breathing is unlabored   Heart:  Not performed.   Breasts:  Not performed.   Abdomen: soft, non-tender; no masses  no umbilical or inguinal hernias are present  no hepato-splenomegaly   Pelvis: Clinical staff was present for exam  External genitalia:  normal appearance of the external genitalia  including Bartholin's and [place]'s glands.  :  urethral meatus normal;  Vaginal:  normal pink mucosa without prolapse or lesions.  Cervix:  normal appearance.  Uterus:  tenderness to palpation is present;  Adnexa:  non palpable bilaterally. tenderness of the right adnexa to  superficial and deep palpation;  Rectal:  digital rectal exam not performed; anus visually normal appearing.     Bimanual exam limited d/t body habitus   Pt is tearful during exam today     Assessment / Plan      Assessment  Problems Addressed This Visit    ICD-10-CM ICD-9-CM   1. Right lower quadrant pain  R10.31 789.03   2. Trichomonal vaginitis  A59.01 131.01       Plan    Reviewed records from ER visit 2 days ago   Discussed preliminary pelvic u/s report from today   Consulted with Dr. Vasquez and discussed with pt there are no abnormal GYN findings that would explain the degree of pain she is having   Instructed to report back to ER for further evaluation - pt v/u   Called [facility] ER and report given to Ronnie   Will call pt with final pelvic u/s report and to f/u   KENY pending for trichomonas              Follow Up  Return for Will call pt to discuss pelvic u/s report and f/u .  Patient was given instructions and counseling regarding her condition or for health maintenance advice. Please see specific information pulled into the AVS if appropriate.     Sarah Valentin, WILMA  June 21, 2022  14:06 EDT

## 2022-06-21 NOTE — ED PROVIDER NOTES
Subjective   Pt is a 40 yo female presenting to ED with complaints of abdominal pain. PMHx significant for Anxiety, Depression, HTN and Obesity. Pt reports developing right lower abdominal pain for the past 6 days. Pt complains of sharp pressure to right lower abdomen. At times the pain radiates into her right lower back and then down her right leg. She denies weakness or numbness to LE. She has some nausea but denies vomiting or diarrhea. She was evaluated in ED 6/19/22 and had an CT scan showing a right ovarian cyst. She f/u with OBGYN today, Sarah DENIS and had an US in the office. They referred patient back to ED with concerns that her pain was not coming from a gynecologic source and also noted unable to visualize ovaries on US in the office. They performed a pelvic exam in the office. Patient recently treated for Trichomonas and took a course of Flagyl starting 5/25/22. She denies any vaginal discharge, odor, vaginal bleeding or concern for STD. LMP over a year ago since having an ablation. Her prior abdominal surgical hx includes tubal ligation and D and C. She reports a fever of 102 yesterday and temp on arrival to ED 98.4. She denies dizziness, cough, CP, SOB or urinary sx. She uses tobacco but denies ETOH or drug use.       History provided by:  Patient and medical records      Review of Systems   Constitutional: Negative for chills and fever (yesterday).   HENT: Negative for congestion, sore throat and trouble swallowing.    Eyes: Negative for visual disturbance.   Respiratory: Negative for cough and shortness of breath.    Cardiovascular: Negative for chest pain and leg swelling.   Gastrointestinal: Positive for abdominal pain and nausea. Negative for constipation, diarrhea and vomiting.   Genitourinary: Negative for difficulty urinating, dysuria, flank pain, hematuria, vaginal bleeding and vaginal discharge.   Musculoskeletal: Positive for back pain (right lower back) and myalgias (right  leg). Negative for arthralgias and joint swelling.   Skin: Negative for rash and wound.   Neurological: Negative for dizziness, syncope, speech difficulty, weakness, numbness and headaches.   Psychiatric/Behavioral: Negative for confusion.   All other systems reviewed and are negative.      Past Medical History:   Diagnosis Date   • Anxiety    • Depression    • Hypertension        Allergies   Allergen Reactions   • Lisinopril Cough       Past Surgical History:   Procedure Laterality Date   • D & C HYSTEROSCOPY WITH NOVASURE ENDOMETRIAL ABLATION AND MYOSURE     • LEEP     • TOOTH EXTRACTION     • TUBAL ABDOMINAL LIGATION         Family History   Problem Relation Age of Onset   • No Known Problems Mother    • No Known Problems Father    • Breast cancer Other         age unknown    • Endometrial cancer Neg Hx    • Ovarian cancer Neg Hx        Social History     Socioeconomic History   • Marital status: Single   Tobacco Use   • Smoking status: Current Every Day Smoker     Packs/day: 1.00     Years: 25.00     Pack years: 25.00     Types: Cigarettes   • Smokeless tobacco: Never Used   Substance and Sexual Activity   • Alcohol use: Not Currently     Comment: rare   • Drug use: No   • Sexual activity: Yes     Partners: Male     Birth control/protection: None, Surgical           Objective   Physical Exam  Vitals and nursing note reviewed.   Constitutional:       General: She is not in acute distress.     Appearance: She is well-developed. She is obese.   HENT:      Head: Atraumatic.      Nose: Nose normal.   Eyes:      General: Lids are normal.      Conjunctiva/sclera: Conjunctivae normal.      Pupils: Pupils are equal, round, and reactive to light.   Cardiovascular:      Rate and Rhythm: Normal rate and regular rhythm.      Heart sounds: Normal heart sounds.   Pulmonary:      Effort: Pulmonary effort is normal.      Breath sounds: Normal breath sounds. No wheezing.   Abdominal:      General: There is no distension.       Palpations: Abdomen is soft.      Tenderness: There is abdominal tenderness in the right lower quadrant. There is right CVA tenderness. There is no guarding or rebound.   Musculoskeletal:         General: No tenderness. Normal range of motion.      Cervical back: Normal range of motion and neck supple.   Skin:     General: Skin is warm and dry.      Findings: No erythema or rash.   Neurological:      Mental Status: She is alert and oriented to person, place, and time.      Sensory: Sensation is intact. No sensory deficit.      Motor: Motor function is intact.      Coordination: Coordination is intact.   Psychiatric:         Mood and Affect: Mood is anxious. Affect is tearful.         Speech: Speech normal.         Behavior: Behavior normal.         Procedures           ED Course      Re-examined patient several times in ED. Pt feeling better after multiple rounds of meds. Patient's abd pain and right flank / leg pain improved greatest after Toradol and Solumedrol. Discussed CT results and no new acute findings. Noted right ovarian lesion / cyst and uterine fibroid. Will treat with a course of Norco, Zofran, Motrin and Medrol. Discussed close f/u with PCP and OBGYN. She will return to ED if new / worse sx.     Reviewed old records.     Discussed patient with Dr. Valerio who is agreeable with ED course and tx plan.     Recent Results (from the past 24 hour(s))   Comprehensive Metabolic Panel    Collection Time: 06/21/22  2:14 PM    Specimen: Blood   Result Value Ref Range    Glucose 86 65 - 99 mg/dL    BUN 8 6 - 20 mg/dL    Creatinine 0.68 0.57 - 1.00 mg/dL    Sodium 138 136 - 145 mmol/L    Potassium 4.1 3.5 - 5.2 mmol/L    Chloride 101 98 - 107 mmol/L    CO2 28.0 22.0 - 29.0 mmol/L    Calcium 8.8 8.6 - 10.5 mg/dL    Total Protein 7.0 6.0 - 8.5 g/dL    Albumin 3.90 3.50 - 5.20 g/dL    ALT (SGPT) 8 1 - 33 U/L    AST (SGOT) 10 1 - 32 U/L    Alkaline Phosphatase 85 39 - 117 U/L    Total Bilirubin 0.2 0.0 - 1.2 mg/dL     Globulin 3.1 gm/dL    A/G Ratio 1.3 g/dL    BUN/Creatinine Ratio 11.8 7.0 - 25.0    Anion Gap 9.0 5.0 - 15.0 mmol/L    eGFR 112.4 >60.0 mL/min/1.73   Lipase    Collection Time: 06/21/22  2:14 PM    Specimen: Blood   Result Value Ref Range    Lipase 19 13 - 60 U/L   Green Top (Gel)    Collection Time: 06/21/22  2:14 PM   Result Value Ref Range    Extra Tube Hold for add-ons.    Lavender Top    Collection Time: 06/21/22  2:14 PM   Result Value Ref Range    Extra Tube hold for add-on    Gold Top - SST    Collection Time: 06/21/22  2:14 PM   Result Value Ref Range    Extra Tube Hold for add-ons.    Gray Top    Collection Time: 06/21/22  2:14 PM   Result Value Ref Range    Extra Tube Hold for add-ons.    Light Blue Top    Collection Time: 06/21/22  2:14 PM   Result Value Ref Range    Extra Tube Hold for add-ons.    CBC Auto Differential    Collection Time: 06/21/22  2:14 PM    Specimen: Blood   Result Value Ref Range    WBC 10.22 3.40 - 10.80 10*3/mm3    RBC 4.32 3.77 - 5.28 10*6/mm3    Hemoglobin 13.7 12.0 - 15.9 g/dL    Hematocrit 40.9 34.0 - 46.6 %    MCV 94.7 79.0 - 97.0 fL    MCH 31.7 26.6 - 33.0 pg    MCHC 33.5 31.5 - 35.7 g/dL    RDW 13.6 12.3 - 15.4 %    RDW-SD 47.3 37.0 - 54.0 fl    MPV 8.8 6.0 - 12.0 fL    Platelets 256 140 - 450 10*3/mm3    Neutrophil % 68.8 42.7 - 76.0 %    Lymphocyte % 20.6 19.6 - 45.3 %    Monocyte % 4.3 (L) 5.0 - 12.0 %    Eosinophil % 3.3 0.3 - 6.2 %    Basophil % 0.8 0.0 - 1.5 %    Immature Grans % 2.2 (H) 0.0 - 0.5 %    Neutrophils, Absolute 7.03 (H) 1.70 - 7.00 10*3/mm3    Lymphocytes, Absolute 2.11 0.70 - 3.10 10*3/mm3    Monocytes, Absolute 0.44 0.10 - 0.90 10*3/mm3    Eosinophils, Absolute 0.34 0.00 - 0.40 10*3/mm3    Basophils, Absolute 0.08 0.00 - 0.20 10*3/mm3    Immature Grans, Absolute 0.22 (H) 0.00 - 0.05 10*3/mm3    nRBC 0.0 0.0 - 0.2 /100 WBC   Urinalysis With Microscopic If Indicated (No Culture) - Urine, Clean Catch    Collection Time: 06/21/22  3:11 PM    Specimen: Urine,  "Clean Catch   Result Value Ref Range    Color, UA Yellow Yellow, Straw    Appearance, UA Clear Clear    pH, UA 5.5 5.0 - 8.0    Specific Gravity, UA 1.012 1.001 - 1.030    Glucose, UA Negative Negative    Ketones, UA Negative Negative    Bilirubin, UA Negative Negative    Blood, UA Negative Negative    Protein, UA Negative Negative    Leuk Esterase, UA Negative Negative    Nitrite, UA Negative Negative    Urobilinogen, UA 0.2 E.U./dL 0.2 - 1.0 E.U./dL   POC Pregnancy, Urine    Collection Time: 06/21/22  3:15 PM    Specimen: Urine   Result Value Ref Range    HCG, Urine, QL Negative Negative    Lot Number [id number]     Internal Positive Control Positive Positive, Passed    Internal Negative Control Negative Negative, Passed    Expiration Date 07/31/23      Note: In addition to lab results from this visit, the labs listed above may include labs taken at another facility or during a different encounter within the last 24 hours. Please correlate lab times with ED admission and discharge times for further clarification of the services performed during this visit.    CT Abdomen Pelvis With Contrast   Final Result       1. 3.5 cm nonenhancing right ovarian lesion presumably a cyst containing   proteinaceous fluid.   2. Low-density 2.7 cm fibroid or subendometrial cyst of the left lower   uterine segment. Pelvic ultrasound is suggested for further evaluation,   and by history has already been ordered.   3. Gallbladder sludge or noncalcified gallstones.   4. No clearly acute inflammatory process or other acute disease is seen   elsewhere.       This report was finalized on 6/21/2022 4:45 PM by Dr. Aftab Falcon MD.            Vitals:    06/21/22 1244 06/21/22 1529   BP: (!) 169/112 154/82   BP Location: Left arm    Pulse: 85 68   Resp: 20    Temp: 98.4 °F (36.9 °C)    TempSrc: Oral    SpO2: 97% 97%   Weight: (!) 168 kg (370 lb)    Height: 175.3 cm (69\")      Medications   Sodium Chloride (PF) 0.9 % 10 mL (has no administration " in time range)   Morphine sulfate (PF) injection 4 mg (4 mg Intravenous Given 6/21/22 1539)   ondansetron (ZOFRAN) injection 4 mg (4 mg Intravenous Given 6/21/22 1540)   sodium chloride 0.9 % bolus 1,000 mL (1,000 mL Intravenous New Bag 6/21/22 1539)   iopamidol (ISOVUE-300) 61 % injection 100 mL (95 mL Intravenous Given 6/21/22 1615)   HYDROmorphone (DILAUDID) injection 0.5 mg (0.5 mg Intravenous Given 6/21/22 1703)   ketorolac (TORADOL) injection 15 mg (15 mg Intravenous Given 6/21/22 1756)   methylPREDNISolone sodium succinate (SOLU-Medrol) injection 125 mg (125 mg Intravenous Given 6/21/22 1756)     ECG/EMG Results (last 24 hours)     ** No results found for the last 24 hours. **        No orders to display       DISCHARGE    Patient discharged in stable condition.    Reviewed implications of results, diagnosis, meds, responsibility to follow up, warning signs and symptoms of possible worsening, potential complications and reasons to return to ER.    Patient/Family voiced understanding of above instructions.    Discussed plan for discharge, as there is no emergent indication for admission.  Pt/family is agreeable and understands need for follow up and possible repeat testing.  Pt/family is aware that discharge does not mean that nothing is wrong but that it indicates no emergency is currently present that requires admission and they must continue care with follow-up as given below or with a physician of their choice.     FOLLOW-UP  Nahed Molina MD  [address] [facility] [address]  [place] [id number]  [phone]    Schedule an appointment as soon as possible for a visit       Sarah Valentin APRN  [address] [facility]  [address]  [place] [id number]  [phone]    Schedule an appointment as soon as possible for a visit       [facility] Emergency Department  [address] [facility] [address] [phone]    If symptoms worsen         Medication List      New Prescriptions     ibuprofen 800 MG tablet  Commonly known as: ADVIL,MOTRIN  Take 1 tablet by mouth Every 8 (Eight) Hours As Needed for Mild Pain  or Moderate Pain  for up to 30 doses.     methylPREDNISolone 4 MG dose pack  Commonly known as: MEDROL  Take as directed on package instructions.           Where to Get Your Medications      These medications were sent to [address] - [address] [place] [address] - [phone] [facility] [phone]   [address] [facility] [address], [facility] [address]    Phone: [phone]   · HYDROcodone-acetaminophen 5-325 MG per tablet  · ibuprofen 800 MG tablet  · methylPREDNISolone 4 MG dose pack  · ondansetron 4 MG tablet                                    SANGEETA reviewed by Tony Valerio DO MDM    Final diagnoses:   Lesion of right ovary   Uterine leiomyoma, unspecified location   Right lower quadrant abdominal pain       ED Disposition  ED Disposition     ED Disposition   Discharge    Condition   Stable    Comment   --             Nahed Molina MD  [address] [facility] [address]  [place] [address]  [phone]    Schedule an appointment as soon as possible for a visit       Sarah Valentin APRN  [address] [facility] [address]  [place] [id number]  [phone]    Schedule an appointment as soon as possible for a visit       [facility] Emergency Department  [address] [facility] [address] [phone]    If symptoms worsen         Medication List      New Prescriptions    ibuprofen 800 MG tablet  Commonly known as: ADVIL,MOTRIN  Take 1 tablet by mouth Every 8 (Eight) Hours As Needed for Mild Pain  or Moderate Pain  for up to 30 doses.     methylPREDNISolone 4 MG dose pack  Commonly known as: MEDROL  Take as directed on package instructions.           Where to Get Your Medications      These medications were sent to [address] - [address] [facility] - [phone] [facility] [phone] FX  [address] [place] [address]  [address] [place] [id number]    Phone: [phone]   · HYDROcodone-acetaminophen 5-325 MG per tablet  · ibuprofen 800 MG tablet  · methylPREDNISolone 4 MG dose pack  · ondansetron 4 MG tablet          Emilie Darby PA  06/21/22 7398

## 2022-06-22 ENCOUNTER — TELEPHONE (OUTPATIENT)
Dept: OBSTETRICS AND GYNECOLOGY | Facility: CLINIC | Age: 42
End: 2022-06-22

## 2022-06-22 DIAGNOSIS — R10.2 PELVIC PAIN: ICD-10-CM

## 2022-06-22 DIAGNOSIS — N94.89 ADNEXAL MASS: Primary | ICD-10-CM

## 2022-06-23 ENCOUNTER — TELEPHONE (OUTPATIENT)
Dept: OBSTETRICS AND GYNECOLOGY | Facility: CLINIC | Age: 42
End: 2022-06-23

## 2022-06-23 ENCOUNTER — OFFICE VISIT (OUTPATIENT)
Dept: FAMILY MEDICINE CLINIC | Facility: CLINIC | Age: 42
End: 2022-06-23

## 2022-06-23 DIAGNOSIS — K82.8 SLUDGE IN GALLBLADDER: ICD-10-CM

## 2022-06-23 DIAGNOSIS — R10.31 ACUTE RIGHT LOWER QUADRANT PAIN: Primary | ICD-10-CM

## 2022-06-23 DIAGNOSIS — R10.2 PELVIC PAIN: ICD-10-CM

## 2022-06-23 DIAGNOSIS — K59.00 CONSTIPATION, UNSPECIFIED CONSTIPATION TYPE: ICD-10-CM

## 2022-06-23 PROCEDURE — 99214 OFFICE O/P EST MOD 30 MIN: CPT | Performed by: NURSE PRACTITIONER

## 2022-06-23 PROCEDURE — 96372 THER/PROPH/DIAG INJ SC/IM: CPT | Performed by: NURSE PRACTITIONER

## 2022-06-23 RX ORDER — KETOROLAC TROMETHAMINE 30 MG/ML
30 INJECTION, SOLUTION INTRAMUSCULAR; INTRAVENOUS ONCE
Status: COMPLETED | OUTPATIENT
Start: 2022-06-23 | End: 2022-06-23

## 2022-06-23 RX ORDER — KETOROLAC TROMETHAMINE 10 MG/1
10 TABLET, FILM COATED ORAL EVERY 6 HOURS PRN
Qty: 40 TABLET | Refills: 0 | Status: SHIPPED | OUTPATIENT
Start: 2022-06-23 | End: 2022-08-29

## 2022-06-23 RX ORDER — BISACODYL 5 MG
5 TABLET, DELAYED RELEASE (ENTERIC COATED) ORAL DAILY PRN
Qty: 10 TABLET | Refills: 1 | Status: SHIPPED | OUTPATIENT
Start: 2022-06-23 | End: 2022-08-29

## 2022-06-23 RX ORDER — PROMETHAZINE HYDROCHLORIDE 12.5 MG/1
12.5 TABLET ORAL EVERY 8 HOURS PRN
Qty: 30 TABLET | Refills: 0 | Status: SHIPPED | OUTPATIENT
Start: 2022-06-23 | End: 2022-08-29

## 2022-06-23 RX ORDER — DOCUSATE SODIUM 100 MG/1
100 CAPSULE, LIQUID FILLED ORAL 2 TIMES DAILY
Qty: 60 CAPSULE | Refills: 5 | Status: SHIPPED | OUTPATIENT
Start: 2022-06-23 | End: 2022-08-29

## 2022-06-23 RX ADMIN — KETOROLAC TROMETHAMINE 30 MG: 30 INJECTION, SOLUTION INTRAMUSCULAR; INTRAVENOUS at 17:56

## 2022-06-23 NOTE — PROGRESS NOTES
Chief Complaint  Abdominal Pain (Abdominal pain in right lower quadrant, said she has been to er twice this week, first time on Sunday, was referred to ob at ct scan because found one ovarian cyst, ob said the one cyst shouldn't be causing pain, ob sent back to er, another ct done and two cysts found and fibroid, ultrasound done and said couldn't find ovaries, gallstones found as well.)    Subjective          Starr Ruiz presents to [facility]  Patient is here for c/o right lower quadrant pain. It started 9 days ago. It is sharp and intense.   She has taken over the counter pain medication with minimal relief. She states she has been to ER twice this week. First time - has a CT scan - had ovarian cyst. Specialist did not feel her pain was from the cyst. She was sent back to ER.  She has a repeat CT scan showed a intramural uterine fibroid. Gallbladder sludge seen.      she has an MRI scheduled.   Her pregnancy test was negative. She had a CBC, CMP, lipase and urinalysis were all normal.              Abdominal Pain  Associated symptoms include constipation and nausea. Pertinent negatives include no diarrhea or vomiting.       The following portions of the patient's history were reviewed and updated as appropriate: allergies, current medications, past family history, past medical history, past social history, past surgical history and problem list.    Review of Systems   Constitutional: Negative.    HENT: Negative.    Respiratory: Negative.    Gastrointestinal: Positive for abdominal pain, constipation and nausea. Negative for abdominal distention, anal bleeding, blood in stool, diarrhea, rectal pain and vomiting.   Musculoskeletal: Negative.    Skin: Negative.    Allergic/Immunologic: Negative.    Neurological: Negative.    Hematological: Negative.    Psychiatric/Behavioral: Negative.          Objective   Vital Signs:   /94   Pulse 70   Temp 98.2 °F (36.8 °C)  "(Temporal)   Resp 16   Ht 175.3 cm (69\")   Wt (!) 173 kg (381 lb 6.4 oz)   SpO2 98%   BMI 56.32 kg/m²               Physical Exam  Vitals (recheck blood pressure - improved- will need to follow up in 2 weeks with PCP ) reviewed.   Constitutional:       Appearance: She is obese.   HENT:      Nose: Nose normal.      Mouth/Throat:      Mouth: Mucous membranes are moist.   Cardiovascular:      Rate and Rhythm: Normal rate and regular rhythm.   Pulmonary:      Breath sounds: Normal breath sounds.   Abdominal:      General: There is no distension.      Palpations: Abdomen is soft. There is no mass.      Tenderness: There is no abdominal tenderness. There is no right CVA tenderness, left CVA tenderness, guarding or rebound. Negative signs include Huynh's sign, Rovsing's sign, McBurney's sign, psoas sign and obturator sign.      Hernia: No hernia is present.   Skin:     General: Skin is warm and dry.      Capillary Refill: Capillary refill takes less than 2 seconds.   Neurological:      Mental Status: She is alert and oriented to person, place, and time.   Psychiatric:         Behavior: Behavior normal.         Thought Content: Thought content normal.        Result Review :                CT Abdomen Pelvis With Contrast (06/21/2022 16:11)  CT Abdomen Pelvis With Contrast (06/19/2022 14:35)  US Non-ob Transvaginal (06/21/2022 10:42)    Assessment and Plan    Diagnoses and all orders for this visit:    1. Acute right lower quadrant pain (Primary)  -     ketorolac (TORADOL) injection 30 mg  -     ketorolac (TORADOL) 10 MG tablet; Take 1 tablet by mouth Every 6 (Six) Hours As Needed for Moderate Pain  or Severe Pain .  Dispense: 40 tablet; Refill: 0  -     promethazine (PHENERGAN) 12.5 MG tablet; Take 1 tablet by mouth Every 8 (Eight) Hours As Needed for Nausea or Vomiting.  Dispense: 30 tablet; Refill: 0    2. Sludge in gallbladder  -     Ambulatory Referral to General Surgery    3. Pelvic pain    4. Constipation, " unspecified constipation type  -     bisacodyl (Dulcolax) 5 MG EC tablet; Take 1 tablet by mouth Daily As Needed for Constipation.  Dispense: 10 tablet; Refill: 1  -     docusate sodium (Colace) 100 MG capsule; Take 1 capsule by mouth 2 (Two) Times a Day.  Dispense: 60 capsule; Refill: 5    referral to see general surgeon regarding abdominal pain.   Continue follow up with GYN. She has an upcoming MRI ordered.   toradol injection in office.   She will wait 8 hours before taking the oral form of toradol. No additional NSAIDS with the medication.     Discussed constipation.     Go to ER for any worsening of abdominal pain, bleeding or elevated fever.         Follow Up   Return in about 2 weeks (around 7/7/2022), or with PCP, for Recheck.  Patient was given instructions and counseling regarding her condition or for health maintenance advice. Please see specific information pulled into the AVS if appropriate.

## 2022-06-23 NOTE — TELEPHONE ENCOUNTER
S/w pt - I will call to see if MRI of pelvis can be done before 7/29/22 considering the amount of pain she is having and will let her know.    Discussed trichomonas testing returned negative.

## 2022-06-26 ENCOUNTER — APPOINTMENT (OUTPATIENT)
Dept: MRI IMAGING | Facility: HOSPITAL | Age: 42
End: 2022-06-26

## 2022-06-26 ENCOUNTER — HOSPITAL ENCOUNTER (EMERGENCY)
Facility: HOSPITAL | Age: 42
Discharge: HOME | End: 2022-06-26
Attending: EMERGENCY MEDICINE | Admitting: EMERGENCY MEDICINE

## 2022-06-26 VITALS
SYSTOLIC BLOOD PRESSURE: 139 MMHG | HEART RATE: 83 BPM | TEMPERATURE: 98.3 F | BODY MASS INDEX: 43.4 KG/M2 | OXYGEN SATURATION: 94 % | DIASTOLIC BLOOD PRESSURE: 71 MMHG | RESPIRATION RATE: 20 BRPM | WEIGHT: 293 LBS | HEIGHT: 69 IN

## 2022-06-26 DIAGNOSIS — D25.9 UTERINE LEIOMYOMA, UNSPECIFIED LOCATION: Primary | ICD-10-CM

## 2022-06-26 DIAGNOSIS — R10.31 RIGHT LOWER QUADRANT ABDOMINAL PAIN: ICD-10-CM

## 2022-06-26 DIAGNOSIS — N83.01 FOLLICULAR CYST OF RIGHT OVARY: ICD-10-CM

## 2022-06-26 LAB
ALBUMIN SERPL-MCNC: 3.7 G/DL (ref 3.5–5.2)
ALBUMIN/GLOB SERPL: 1.2 G/DL
ALP SERPL-CCNC: 76 U/L (ref 39–117)
ALT SERPL W P-5'-P-CCNC: 12 U/L (ref 1–33)
ANION GAP SERPL CALCULATED.3IONS-SCNC: 11 MMOL/L (ref 5–15)
AST SERPL-CCNC: 13 U/L (ref 1–32)
B-HCG UR QL: NEGATIVE
BASOPHILS # BLD AUTO: 0.08 10*3/MM3 (ref 0–0.2)
BASOPHILS NFR BLD AUTO: 0.7 % (ref 0–1.5)
BILIRUB SERPL-MCNC: 0.2 MG/DL (ref 0–1.2)
BILIRUB UR QL STRIP: NEGATIVE
BUN SERPL-MCNC: 11 MG/DL (ref 6–20)
BUN/CREAT SERPL: 14.3 (ref 7–25)
CALCIUM SPEC-SCNC: 8.8 MG/DL (ref 8.6–10.5)
CHLORIDE SERPL-SCNC: 102 MMOL/L (ref 98–107)
CLARITY UR: CLEAR
CO2 SERPL-SCNC: 25 MMOL/L (ref 22–29)
COLOR UR: YELLOW
CREAT SERPL-MCNC: 0.77 MG/DL (ref 0.57–1)
DEPRECATED RDW RBC AUTO: 47.6 FL (ref 37–54)
EGFRCR SERPLBLD CKD-EPI 2021: 99.5 ML/MIN/1.73
EOSINOPHIL # BLD AUTO: 0.37 10*3/MM3 (ref 0–0.4)
EOSINOPHIL NFR BLD AUTO: 3.2 % (ref 0.3–6.2)
ERYTHROCYTE [DISTWIDTH] IN BLOOD BY AUTOMATED COUNT: 13.7 % (ref 12.3–15.4)
GLOBULIN UR ELPH-MCNC: 3 GM/DL
GLUCOSE SERPL-MCNC: 80 MG/DL (ref 65–99)
GLUCOSE UR STRIP-MCNC: NEGATIVE MG/DL
HCT VFR BLD AUTO: 41.3 % (ref 34–46.6)
HGB BLD-MCNC: 13.7 G/DL (ref 12–15.9)
HGB UR QL STRIP.AUTO: NEGATIVE
HOLD SPECIMEN: NORMAL
IMM GRANULOCYTES # BLD AUTO: 0.16 10*3/MM3 (ref 0–0.05)
IMM GRANULOCYTES NFR BLD AUTO: 1.4 % (ref 0–0.5)
KETONES UR QL STRIP: NEGATIVE
LEUKOCYTE ESTERASE UR QL STRIP.AUTO: NEGATIVE
LIPASE SERPL-CCNC: 24 U/L (ref 13–60)
LYMPHOCYTES # BLD AUTO: 2.26 10*3/MM3 (ref 0.7–3.1)
LYMPHOCYTES NFR BLD AUTO: 19.7 % (ref 19.6–45.3)
MCH RBC QN AUTO: 31.3 PG (ref 26.6–33)
MCHC RBC AUTO-ENTMCNC: 33.2 G/DL (ref 31.5–35.7)
MCV RBC AUTO: 94.3 FL (ref 79–97)
MONOCYTES # BLD AUTO: 0.58 10*3/MM3 (ref 0.1–0.9)
MONOCYTES NFR BLD AUTO: 5.1 % (ref 5–12)
NEUTROPHILS NFR BLD AUTO: 69.9 % (ref 42.7–76)
NEUTROPHILS NFR BLD AUTO: 8 10*3/MM3 (ref 1.7–7)
NITRITE UR QL STRIP: NEGATIVE
NRBC BLD AUTO-RTO: 0 /100 WBC (ref 0–0.2)
PH UR STRIP.AUTO: 5.5 [PH] (ref 5–8)
PLATELET # BLD AUTO: 243 10*3/MM3 (ref 140–450)
PMV BLD AUTO: 8.7 FL (ref 6–12)
POTASSIUM SERPL-SCNC: 4.4 MMOL/L (ref 3.5–5.2)
PROT SERPL-MCNC: 6.7 G/DL (ref 6–8.5)
PROT UR QL STRIP: NEGATIVE
RBC # BLD AUTO: 4.38 10*6/MM3 (ref 3.77–5.28)
SODIUM SERPL-SCNC: 138 MMOL/L (ref 136–145)
SP GR UR STRIP: 1.01 (ref 1–1.03)
UROBILINOGEN UR QL STRIP: NORMAL
WBC NRBC COR # BLD: 11.45 10*3/MM3 (ref 3.4–10.8)
WHOLE BLOOD HOLD COAG: NORMAL
WHOLE BLOOD HOLD SPECIMEN: NORMAL

## 2022-06-26 PROCEDURE — 25010000002 ONDANSETRON PER 1 MG: Performed by: EMERGENCY MEDICINE

## 2022-06-26 PROCEDURE — 85025 COMPLETE CBC W/AUTO DIFF WBC: CPT | Performed by: EMERGENCY MEDICINE

## 2022-06-26 PROCEDURE — 25010000002 DIAZEPAM PER 5 MG: Performed by: EMERGENCY MEDICINE

## 2022-06-26 PROCEDURE — 99283 EMERGENCY DEPT VISIT LOW MDM: CPT

## 2022-06-26 PROCEDURE — A9577 INJ MULTIHANCE: HCPCS | Performed by: EMERGENCY MEDICINE

## 2022-06-26 PROCEDURE — 81003 URINALYSIS AUTO W/O SCOPE: CPT | Performed by: EMERGENCY MEDICINE

## 2022-06-26 PROCEDURE — 96375 TX/PRO/DX INJ NEW DRUG ADDON: CPT

## 2022-06-26 PROCEDURE — 81025 URINE PREGNANCY TEST: CPT | Performed by: EMERGENCY MEDICINE

## 2022-06-26 PROCEDURE — 25010000002 HYDROMORPHONE 1 MG/ML SOLUTION: Performed by: EMERGENCY MEDICINE

## 2022-06-26 PROCEDURE — 96376 TX/PRO/DX INJ SAME DRUG ADON: CPT

## 2022-06-26 PROCEDURE — 80053 COMPREHEN METABOLIC PANEL: CPT | Performed by: EMERGENCY MEDICINE

## 2022-06-26 PROCEDURE — 0 GADOBENATE DIMEGLUMINE 529 MG/ML SOLUTION: Performed by: EMERGENCY MEDICINE

## 2022-06-26 PROCEDURE — 96374 THER/PROPH/DIAG INJ IV PUSH: CPT

## 2022-06-26 PROCEDURE — 25010000002 KETOROLAC TROMETHAMINE PER 15 MG: Performed by: NURSE PRACTITIONER

## 2022-06-26 PROCEDURE — 25010000002 MORPHINE PER 10 MG: Performed by: EMERGENCY MEDICINE

## 2022-06-26 PROCEDURE — 83690 ASSAY OF LIPASE: CPT | Performed by: EMERGENCY MEDICINE

## 2022-06-26 PROCEDURE — 72197 MRI PELVIS W/O & W/DYE: CPT

## 2022-06-26 RX ORDER — ONDANSETRON 2 MG/ML
4 INJECTION INTRAMUSCULAR; INTRAVENOUS ONCE
Status: COMPLETED | OUTPATIENT
Start: 2022-06-26 | End: 2022-06-26

## 2022-06-26 RX ORDER — DIAZEPAM 5 MG/ML
5 INJECTION, SOLUTION INTRAMUSCULAR; INTRAVENOUS ONCE
Status: COMPLETED | OUTPATIENT
Start: 2022-06-26 | End: 2022-06-26

## 2022-06-26 RX ORDER — MORPHINE SULFATE 4 MG/ML
4 INJECTION, SOLUTION INTRAMUSCULAR; INTRAVENOUS ONCE
Status: COMPLETED | OUTPATIENT
Start: 2022-06-26 | End: 2022-06-26

## 2022-06-26 RX ORDER — SODIUM CHLORIDE 9 MG/ML
10 INJECTION INTRAVENOUS AS NEEDED
Status: DISCONTINUED | OUTPATIENT
Start: 2022-06-26 | End: 2022-06-27 | Stop reason: HOSPADM

## 2022-06-26 RX ORDER — KETOROLAC TROMETHAMINE 15 MG/ML
15 INJECTION, SOLUTION INTRAMUSCULAR; INTRAVENOUS ONCE
Status: COMPLETED | OUTPATIENT
Start: 2022-06-26 | End: 2022-06-26

## 2022-06-26 RX ORDER — LORAZEPAM 2 MG/ML
1 INJECTION INTRAMUSCULAR ONCE
Status: DISCONTINUED | OUTPATIENT
Start: 2022-06-26 | End: 2022-06-26

## 2022-06-26 RX ADMIN — HYDROMORPHONE HYDROCHLORIDE 1 MG: 1 INJECTION, SOLUTION INTRAMUSCULAR; INTRAVENOUS; SUBCUTANEOUS at 17:42

## 2022-06-26 RX ADMIN — GADOBENATE DIMEGLUMINE 20 ML: 529 INJECTION, SOLUTION INTRAVENOUS at 22:01

## 2022-06-26 RX ADMIN — ONDANSETRON 4 MG: 2 INJECTION INTRAMUSCULAR; INTRAVENOUS at 16:38

## 2022-06-26 RX ADMIN — DIAZEPAM 5 MG: 5 INJECTION, SOLUTION INTRAMUSCULAR; INTRAVENOUS at 19:40

## 2022-06-26 RX ADMIN — DIAZEPAM 5 MG: 5 INJECTION, SOLUTION INTRAMUSCULAR; INTRAVENOUS at 22:03

## 2022-06-26 RX ADMIN — KETOROLAC TROMETHAMINE 15 MG: 15 INJECTION, SOLUTION INTRAMUSCULAR; INTRAVENOUS at 21:59

## 2022-06-26 RX ADMIN — SODIUM CHLORIDE 1000 ML: 9 INJECTION, SOLUTION INTRAVENOUS at 16:38

## 2022-06-26 RX ADMIN — MORPHINE SULFATE 4 MG: 4 INJECTION, SOLUTION INTRAMUSCULAR; INTRAVENOUS at 16:38

## 2022-06-26 NOTE — ED PROVIDER NOTES
Subjective   Starr Ruiz is a 41-year-old female that presents emergency department complaints of lower abdominal pain.  Patient has had lower abdominal pain for the past 2 weeks.  She complains of nausea but denies any vomiting or diarrhea.  Pain is currently an 8 out of 10.  Patient has been seen in our emergency department this would make her third visit.  She has had 2 different CTs.  She explains they have showed some sludge in her gallbladder and cyst on her ovaries.  Patient followed up with her OB/GYN for the fibroids and the cyst and they advised that they were not causing the pain and sent her back to the ER for repeat eval.  Patient return additional CT was done and she was encouraged to go back to her OB.  Patient explains that they attempted to do a vaginal ultrasound however due to her body habitus they were unable to fully visualize her ovaries so they wanted her to have an MRI of her pelvis.  Pain is currently an 8 out of 10.  She denies any urinary frequency, burning, urgency.      History provided by:  Patient   used: No    Abdominal Pain  Pain location:  LLQ and RLQ  Pain quality: sharp and stabbing    Pain radiates to:  Does not radiate  Pain severity:  Moderate  Duration:  2 weeks  Timing:  Constant  Progression:  Worsening  Relieved by:  Nothing  Worsened by:  Nothing  Associated symptoms: nausea    Associated symptoms: no chest pain, no chills, no constipation, no diarrhea, no dysuria, no fever, no shortness of breath and no vomiting    Risk factors: obesity        Review of Systems   Constitutional: Negative for chills and fever.   Respiratory: Negative for shortness of breath.    Cardiovascular: Negative for chest pain.   Gastrointestinal: Positive for abdominal pain and nausea. Negative for constipation, diarrhea and vomiting.   Genitourinary: Negative for dysuria, frequency and urgency.   Musculoskeletal: Negative for back pain.   Neurological: Negative for  weakness.   All other systems reviewed and are negative.      Past Medical History:   Diagnosis Date   • Anxiety    • Depression    • Functional ovarian cysts    • Hypertension    • Uterine fibroid        Allergies   Allergen Reactions   • Lisinopril Cough       Past Surgical History:   Procedure Laterality Date   • D & C HYSTEROSCOPY WITH NOVASURE ENDOMETRIAL ABLATION AND MYOSURE     • LEEP     • TOOTH EXTRACTION     • TUBAL ABDOMINAL LIGATION         Family History   Problem Relation Age of Onset   • No Known Problems Mother    • No Known Problems Father    • Breast cancer Other         age unknown    • Endometrial cancer Neg Hx    • Ovarian cancer Neg Hx        Social History     Socioeconomic History   • Marital status: Single   Tobacco Use   • Smoking status: Current Every Day Smoker     Packs/day: 1.00     Years: 25.00     Pack years: 25.00     Types: Cigarettes   • Smokeless tobacco: Never Used   Vaping Use   • Vaping Use: Never used   Substance and Sexual Activity   • Alcohol use: Not Currently     Comment: rare   • Drug use: No   • Sexual activity: Yes     Partners: Male     Birth control/protection: None, Surgical           Objective   Physical Exam  Vitals and nursing note reviewed.   Constitutional:       Appearance: Normal appearance. She is well-developed. She is not toxic-appearing.   HENT:      Head: Normocephalic and atraumatic.      Mouth/Throat:      Mouth: Mucous membranes are moist.   Eyes:      General: Lids are normal.      Extraocular Movements: Extraocular movements intact.      Conjunctiva/sclera: Conjunctivae normal.      Pupils: Pupils are equal, round, and reactive to light.   Neck:      Trachea: Trachea normal.   Cardiovascular:      Rate and Rhythm: Regular rhythm.      Pulses: Normal pulses.      Heart sounds: Normal heart sounds.   Pulmonary:      Effort: Pulmonary effort is normal. No respiratory distress.      Breath sounds: Normal breath sounds. No decreased breath sounds,  wheezing, rhonchi or rales.   Abdominal:      General: Bowel sounds are normal.      Palpations: Abdomen is soft.      Tenderness: There is abdominal tenderness in the right lower quadrant and left lower quadrant.   Musculoskeletal:         General: Normal range of motion.      Cervical back: Full passive range of motion without pain and normal range of motion.   Skin:     General: Skin is warm and dry.      Findings: No rash.   Neurological:      Mental Status: She is alert and oriented to person, place, and time.      Cranial Nerves: No cranial nerve deficit.   Psychiatric:         Speech: Speech normal.         Behavior: Behavior normal. Behavior is cooperative.         Procedures           ED Course  ED Course as of 06/26/22 2351   Sun Jun 26, 2022   1716 WBC(!): 11.45 [KG]   2343 Results were discussed with the patient at this time.  She will be discharged home.  Patient to follow-up with her OB/GYN.  Patient to see her PCP.  Patient to alternate Tylenol and ibuprofen for discomfort. [KG]      ED Course User Index  [KG] Celi Simental, WILMA           Recent Results (from the past 24 hour(s))   Urinalysis With Microscopic If Indicated (No Culture) - Urine, Clean Catch    Collection Time: 06/26/22  3:02 PM    Specimen: Urine, Clean Catch   Result Value Ref Range    Color, UA Yellow Yellow, Straw    Appearance, UA Clear Clear    pH, UA 5.5 5.0 - 8.0    Specific Gravity, UA 1.008 1.001 - 1.030    Glucose, UA Negative Negative    Ketones, UA Negative Negative    Bilirubin, UA Negative Negative    Blood, UA Negative Negative    Protein, UA Negative Negative    Leuk Esterase, UA Negative Negative    Nitrite, UA Negative Negative    Urobilinogen, UA 0.2 E.U./dL 0.2 - 1.0 E.U./dL   Pregnancy, Urine - Urine, Clean Catch    Collection Time: 06/26/22  3:02 PM    Specimen: Urine, Clean Catch   Result Value Ref Range    HCG, Urine QL Negative Negative   Comprehensive Metabolic Panel    Collection Time: 06/26/22  4:39 PM     Specimen: Blood   Result Value Ref Range    Glucose 80 65 - 99 mg/dL    BUN 11 6 - 20 mg/dL    Creatinine 0.77 0.57 - 1.00 mg/dL    Sodium 138 136 - 145 mmol/L    Potassium 4.4 3.5 - 5.2 mmol/L    Chloride 102 98 - 107 mmol/L    CO2 25.0 22.0 - 29.0 mmol/L    Calcium 8.8 8.6 - 10.5 mg/dL    Total Protein 6.7 6.0 - 8.5 g/dL    Albumin 3.70 3.50 - 5.20 g/dL    ALT (SGPT) 12 1 - 33 U/L    AST (SGOT) 13 1 - 32 U/L    Alkaline Phosphatase 76 39 - 117 U/L    Total Bilirubin 0.2 0.0 - 1.2 mg/dL    Globulin 3.0 gm/dL    A/G Ratio 1.2 g/dL    BUN/Creatinine Ratio 14.3 7.0 - 25.0    Anion Gap 11.0 5.0 - 15.0 mmol/L    eGFR 99.5 >60.0 mL/min/1.73   Lipase    Collection Time: 06/26/22  4:39 PM    Specimen: Blood   Result Value Ref Range    Lipase 24 13 - 60 U/L   Green Top (Gel)    Collection Time: 06/26/22  4:39 PM   Result Value Ref Range    Extra Tube Hold for add-ons.    Lavender Top    Collection Time: 06/26/22  4:39 PM   Result Value Ref Range    Extra Tube hold for add-on    Gold Top - SST    Collection Time: 06/26/22  4:39 PM   Result Value Ref Range    Extra Tube Hold for add-ons.    Gray Top    Collection Time: 06/26/22  4:39 PM   Result Value Ref Range    Extra Tube Hold for add-ons.    Light Blue Top    Collection Time: 06/26/22  4:39 PM   Result Value Ref Range    Extra Tube Hold for add-ons.    CBC Auto Differential    Collection Time: 06/26/22  4:39 PM    Specimen: Blood   Result Value Ref Range    WBC 11.45 (H) 3.40 - 10.80 10*3/mm3    RBC 4.38 3.77 - 5.28 10*6/mm3    Hemoglobin 13.7 12.0 - 15.9 g/dL    Hematocrit 41.3 34.0 - 46.6 %    MCV 94.3 79.0 - 97.0 fL    MCH 31.3 26.6 - 33.0 pg    MCHC 33.2 31.5 - 35.7 g/dL    RDW 13.7 12.3 - 15.4 %    RDW-SD 47.6 37.0 - 54.0 fl    MPV 8.7 6.0 - 12.0 fL    Platelets 243 140 - 450 10*3/mm3    Neutrophil % 69.9 42.7 - 76.0 %    Lymphocyte % 19.7 19.6 - 45.3 %    Monocyte % 5.1 5.0 - 12.0 %    Eosinophil % 3.2 0.3 - 6.2 %    Basophil % 0.7 0.0 - 1.5 %    Immature Grans %  "1.4 (H) 0.0 - 0.5 %    Neutrophils, Absolute 8.00 (H) 1.70 - 7.00 10*3/mm3    Lymphocytes, Absolute 2.26 0.70 - 3.10 10*3/mm3    Monocytes, Absolute 0.58 0.10 - 0.90 10*3/mm3    Eosinophils, Absolute 0.37 0.00 - 0.40 10*3/mm3    Basophils, Absolute 0.08 0.00 - 0.20 10*3/mm3    Immature Grans, Absolute 0.16 (H) 0.00 - 0.05 10*3/mm3    nRBC 0.0 0.0 - 0.2 /100 WBC     Note: In addition to lab results from this visit, the labs listed above may include labs taken at another facility or during a different encounter within the last 24 hours. Please correlate lab times with ED admission and discharge times for further clarification of the services performed during this visit.    MRI Pelvis With & Without Contrast   Preliminary Result   1.  Fluid-fluid levels centered in the uterus, could reflect fluid in the endometrial canal, with consideration of postprocedural change, hemorrhage/blood products in the endometrium post-ablation, fibroid with hemorrhage   2.  Right intramural 6.5 cm fibroid   3.  Right adnexal follicular cysts.      FINAL REPORT TO FOLLOW        Vitals:    06/26/22 1441 06/26/22 1701 06/26/22 1801   BP: (!) 179/110 160/84 139/71   BP Location: Left arm     Patient Position: Sitting     Pulse: 83     Resp: 20     Temp: 98.3 °F (36.8 °C)     TempSrc: Oral     SpO2: 94% 95% 94%   Weight: (!) 172 kg (380 lb)     Height: 175.3 cm (69\")       Medications   Sodium Chloride (PF) 0.9 % 10 mL (has no administration in time range)   sodium chloride 0.9 % bolus 1,000 mL (0 mL Intravenous Stopped 6/26/22 1855)   Morphine sulfate (PF) injection 4 mg (4 mg Intravenous Given 6/26/22 1638)   ondansetron (ZOFRAN) injection 4 mg (4 mg Intravenous Given 6/26/22 1638)   HYDROmorphone (DILAUDID) injection 1 mg (1 mg Intravenous Given 6/26/22 1742)   diazePAM (VALIUM) injection 5 mg (5 mg Intravenous Given 6/26/22 1940)   diazePAM (VALIUM) injection 5 mg (5 mg Intravenous Given by Other 6/26/22 2203)   gadobenate dimeglumine " (MULTIHANCE) injection 20 mL (20 mL Intravenous Given 6/26/22 2201)   ketorolac (TORADOL) injection 15 mg (15 mg Intravenous Given 6/26/22 2159)     ECG/EMG Results (last 24 hours)     ** No results found for the last 24 hours. **        No orders to display                                       MDM    Final diagnoses:   Uterine leiomyoma, unspecified location   Follicular cyst of right ovary   Right lower quadrant abdominal pain       ED Disposition  ED Disposition     ED Disposition   Discharge    Condition   Stable    Comment   --             Nahed Molina MD  [address]  [place] [address]  [phone]          Sarah Valentin, APRN  [address]  [place] [facility] [address]  [phone]               Medication List      No changes were made to your prescriptions during this visit.          Celi Simental, APRN  06/26/22 6972

## 2022-06-27 ENCOUNTER — TELEPHONE (OUTPATIENT)
Dept: OBSTETRICS AND GYNECOLOGY | Facility: CLINIC | Age: 42
End: 2022-06-27

## 2022-06-27 DIAGNOSIS — N83.201 OVARIAN CYST, RIGHT: ICD-10-CM

## 2022-06-27 DIAGNOSIS — R10.31 RIGHT LOWER QUADRANT ABDOMINAL PAIN: ICD-10-CM

## 2022-06-27 RX ORDER — HYDROCODONE BITARTRATE AND ACETAMINOPHEN 5; 325 MG/1; MG/1
2 TABLET ORAL EVERY 6 HOURS PRN
Qty: 18 TABLET | Refills: 0 | Status: SHIPPED | OUTPATIENT
Start: 2022-06-27 | End: 2022-08-11

## 2022-06-27 NOTE — TELEPHONE ENCOUNTER
PC from pt this AM - states she was back in the ER yesterday 6/26/22 for severe RLQ pain again.  ER records reviewed - CBC stable.  MRI of pelvis done - right ovarian cysts still present and stable, fibroid, fluid filled uterus- she is s/p endometrial ablation.  States her pain is severe - she has only worked a couple of days of the last 2 wks.    Consulted with Dr. Vasquez - surgical consult scheduled and pt is agreeable for tomorrow at 2:40 PM.  Informed her of appt date and time.  She requests refills of pain medication - Norco 5/325 refilled.    If pain becomes severe, she will return to ER.

## 2022-06-30 ENCOUNTER — OFFICE VISIT (OUTPATIENT)
Dept: OBSTETRICS AND GYNECOLOGY | Facility: CLINIC | Age: 42
End: 2022-06-30

## 2022-06-30 VITALS
HEART RATE: 70 BPM | BODY MASS INDEX: 43.4 KG/M2 | DIASTOLIC BLOOD PRESSURE: 94 MMHG | SYSTOLIC BLOOD PRESSURE: 136 MMHG | WEIGHT: 293 LBS | TEMPERATURE: 98.2 F | OXYGEN SATURATION: 98 % | HEIGHT: 69 IN | RESPIRATION RATE: 16 BRPM

## 2022-06-30 VITALS — WEIGHT: 277 LBS | BODY MASS INDEX: 40.91 KG/M2 | RESPIRATION RATE: 14 BRPM

## 2022-06-30 DIAGNOSIS — R10.31 RLQ ABDOMINAL PAIN: Primary | ICD-10-CM

## 2022-06-30 DIAGNOSIS — D25.2 SUBSEROUS LEIOMYOMA OF UTERUS: ICD-10-CM

## 2022-06-30 PROBLEM — R42 DIZZINESS: Status: RESOLVED | Noted: 2019-02-20 | Resolved: 2022-06-30

## 2022-06-30 PROBLEM — J06.9 UPPER RESPIRATORY INFECTION: Status: RESOLVED | Noted: 2021-11-05 | Resolved: 2022-06-30

## 2022-06-30 PROBLEM — R23.2 FLUSHING: Status: RESOLVED | Noted: 2019-02-20 | Resolved: 2022-06-30

## 2022-06-30 PROBLEM — Z01.419 WELL WOMAN EXAM: Status: ACTIVE | Noted: 2022-06-30

## 2022-06-30 PROBLEM — B97.89 VIRAL RESPIRATORY INFECTION: Status: RESOLVED | Noted: 2021-09-02 | Resolved: 2022-06-30

## 2022-06-30 PROBLEM — R35.0 URINARY FREQUENCY: Status: RESOLVED | Noted: 2019-02-20 | Resolved: 2022-06-30

## 2022-06-30 PROBLEM — F17.210 CIGARETTE SMOKER: Status: ACTIVE | Noted: 2019-01-14

## 2022-06-30 PROBLEM — J98.8 VIRAL RESPIRATORY INFECTION: Status: RESOLVED | Noted: 2021-09-02 | Resolved: 2022-06-30

## 2022-06-30 PROCEDURE — 99214 OFFICE O/P EST MOD 30 MIN: CPT | Performed by: OBSTETRICS & GYNECOLOGY

## 2022-06-30 NOTE — PROGRESS NOTES
Subjective   Chief Complaint   Patient presents with   • Consult       Starr Breanna uRiz is a 41 y.o. year old .  No LMP recorded. Patient has had an ablation.  She comes today in referral to talk about issues with pain.  Her symptoms came on reasonably acutely 2 weeks ago.  Prior to that she was hearing very little issues with pain.  She has been seen in the emergency room on 3 occasions related to this pain.  She has been prescribed narcotic pain medicine, Zofran and Phenergan for this.  She is also been prescribed a steroid Dosepak but it has not been initiated.    All of her emergency room visits have been at [facility].  In  on both the  and .  She had a CBC, CMP, lipase, urinalysis all of which were normal.  She had a pregnancy test that was negative.  She was seen again  and the seen studies were all repeated and also normal.  On  she had a CAT scan that showed no acute abdominal pelvic findings and a normal-appearing appendix.  On  she had an ultrasound done through our clinic which was normal other than an intramural uterine fibroid.   she had an MRI which was unremarkable other than showing a 7 cm fundal intramural fibroid.    She has had a prior endometrial ablation done at an outside facility.  She was told that fibroid was resected at the time.  She is having no bleeding since the time of the endometrial ablation.    Her pain is very low and limited to the right side.  It can sometimes radiate into the back and into the upper right leg.  It is not made worse by bowel movements or urination.  She has not been sexually active since the pain began.  Her pain does not seem to be made worse by activity.  She has a bowel movement at least daily and has not had issues with constipation until initiating the narcotic pain medicine.    The following portions of the patient's history were reviewed and updated as appropriate:current medications, allergies,  past medical history, past social history and past surgical history    Social History    Tobacco Use      Smoking status: Current Every Day Smoker        Packs/day: 1.00        Years: 25.00        Pack years: 25        Types: Cigarettes        Start date: 1995      Smokeless tobacco: Never Used         Objective   Resp 14   Wt 126 kg (277 lb)   Breastfeeding No   BMI 40.91 kg/m²       Abdomen:  Carnett's sign is absent   Extremities:  Pain is not reproduced with straight leg raise, heel tap and psoas sign is absent   Pelvis: Exam limited by  body habitus  Clinical staff was present for exam  External genitalia:  normal appearance of the external genitalia including Bartholin's and Hannaford's glands.  :  urethral meatus normal;  Vaginal:  normal pink mucosa without prolapse or lesions.  Cervix:  normal appearance. cervical motion tenderness is absent;  Uterus:  tenderness to palpation is absent;  Adnexa:  non palpable bilaterally.  Rectal:  digital rectal exam not performed; anus visually normal appearing.  Pelvic floor pain: pelvic floor is nontender to palpation in 4 quadrants.       Lab Review   See HPI    Imaging   See HPI       Assessment   1. Right lower quadrant pain of unclear etiology.  History is not highly suggestive of a gynecologic or colorectal etiology.  I suspect this to be muscular in nature     Plan   1. At this point I like to start with physical therapy and see if that helps her discomfort.  I do not think this is a pelvic floor issue but cannot exclude.  If that does not help neck step would be laparoscopic evaluation  2. The following data needs to be obtained to update her medical records: operative report from her Prior endometrial ablation.  3. The importance of keeping all planned follow-up and taking all medications as prescribed was emphasized.  4. Follow up PRN           This note was electronically signed.    Zurdo Vasquez M.D.  June 30, 2022    Total time spent today with  Starr  was 30-39 minutes (level 4) - pathophysiology of her presenting problem along with plans for any diagnositc work-up and treatment.    Part of this note may be an electronic transcription/translation of spoken language to printed text using the Dragon Dictation System.

## 2022-07-29 ENCOUNTER — APPOINTMENT (OUTPATIENT)
Dept: MRI IMAGING | Facility: HOSPITAL | Age: 42
End: 2022-07-29

## 2022-08-11 ENCOUNTER — TELEMEDICINE (OUTPATIENT)
Dept: FAMILY MEDICINE CLINIC | Facility: CLINIC | Age: 42
End: 2022-08-11

## 2022-08-11 ENCOUNTER — TELEPHONE (OUTPATIENT)
Dept: FAMILY MEDICINE CLINIC | Facility: CLINIC | Age: 42
End: 2022-08-11

## 2022-08-11 DIAGNOSIS — U07.1 COVID-19: Primary | ICD-10-CM

## 2022-08-11 PROCEDURE — 99213 OFFICE O/P EST LOW 20 MIN: CPT | Performed by: NURSE PRACTITIONER

## 2022-08-11 NOTE — PROGRESS NOTES
"Chief Complaint  Covid-19 Home Monitoring Video Visit    Subjective        Starr Ruiz presents to [facility]  Patient presents for telemedicine video visit and provides verbal consent for visit type. Patient presents with complaints of COVID positivity and states she took 3 at home tests today that were positive and 5 other co-workers positive. Symptoms include cough that is non-productive, shortness of breath, body aches, fatigue, headache, loss of taste and smell. States symptoms started 2 days ago and worsening. Denies fever. Patient requesting treatment with anti-viral Paxlovid and provides verbal consent for treatment. No history of kidney or liver issues. No current medications that present contraindication of Paxlovid use. Last and most recent BMI 40.9.       Objective   Vital Signs:  There were no vitals taken for this visit.  Estimated body mass index is 40.91 kg/m² as calculated from the following:    Height as of 6/26/22: 175.3 cm (69\").    Weight as of 6/30/22: 126 kg (277 lb).          Physical Exam  Constitutional:       General: She is not in acute distress.  Pulmonary:      Effort: Pulmonary effort is normal.   Neurological:      Mental Status: She is alert and oriented to person, place, and time.        Result Review :    Common labs    Common Labsle 6/19/22 6/19/22 6/21/22 6/21/22 6/26/22 6/26/22    1327 1327 1414 1414 1639 1639   Glucose  125 (A)  86  80   BUN  10  8  11   Creatinine  0.68  0.68  0.77   Sodium  140  138  138   Potassium  4.0  4.1  4.4   Chloride  102  101  102   Calcium  9.2  8.8  8.8   Albumin  3.90  3.90  3.70   Total Bilirubin  0.2  0.2  0.2   Alkaline Phosphatase  84  85  76   AST (SGOT)  9  10  13   ALT (SGPT)  7  8  12   WBC 9.31  10.22  11.45 (A)    Hemoglobin 13.9  13.7  13.7    Hematocrit 41.5  40.9  41.3    Platelets 237  256  243    (A) Abnormal value       Comments are available for some flowsheets but are not being " displayed.                     Assessment and Plan   Diagnoses and all orders for this visit:    1. COVID-19 (Primary)  Assessment & Plan:  Stay well hydrated  Rest  Go to ER if respiratory distress  Start Paxlovid and labs/current medications reviewed with patient and consent obtained from patient.   RTO or call PRN    Orders:  -     Nirmatrelvir & Ritonavir (PAXLOVID) 20 x 150 MG & 10 x 100MG tablet therapy pack tablet; Take 3 tablets by mouth 2 (Two) Times a Day for 5 days.  Dispense: 30 tablet; Refill: 0         I spent 15 minutes caring for Starr on this date of service. This time includes time spent by me in the following activities:preparing for the visit, reviewing tests, obtaining and/or reviewing a separately obtained history, performing a medically appropriate examination and/or evaluation , counseling and educating the patient/family/caregiver, ordering medications, tests, or procedures and documenting information in the medical record  Follow Up   No follow-ups on file.  Patient was given instructions and counseling regarding her condition or for health maintenance advice. Please see specific information pulled into the AVS if appropriate.

## 2022-08-11 NOTE — TELEPHONE ENCOUNTER
Caller: Starr Ruiz    Relationship to patient: Self    Best call back number: [phone]     Date of positive COVID19 test: 8-11-22    COVID19 symptoms: NO TASTE OR SMELL, CONGESTION, COUGH, SHORTNESS OF AIR, BODY ACHES, FATIGUE     Additional information or concerns: PATIENT ASKED IF SHE SHOULD COME IN TO GET A TEST OR WHAT SHOULD SHE DO.     What is the [facility] [address] - [phone] [facility] [phone] FX

## 2022-08-15 PROBLEM — U07.1 COVID-19: Status: ACTIVE | Noted: 2022-08-15

## 2022-08-15 NOTE — ASSESSMENT & PLAN NOTE
Stay well hydrated  Rest  Go to ER if respiratory distress  Start Paxlovid and labs/current medications reviewed with patient and consent obtained from patient.   RTO or call PRN

## 2022-08-29 ENCOUNTER — PRE-ADMISSION TESTING (OUTPATIENT)
Dept: PREADMISSION TESTING | Facility: HOSPITAL | Age: 42
End: 2022-08-29

## 2022-08-29 VITALS — BODY MASS INDEX: 43.4 KG/M2 | HEIGHT: 69 IN | WEIGHT: 293 LBS

## 2022-08-29 LAB
ANION GAP SERPL CALCULATED.3IONS-SCNC: 8 MMOL/L (ref 5–15)
BUN SERPL-MCNC: 13 MG/DL (ref 6–20)
BUN/CREAT SERPL: 16 (ref 7–25)
CALCIUM SPEC-SCNC: 9.3 MG/DL (ref 8.6–10.5)
CHLORIDE SERPL-SCNC: 103 MMOL/L (ref 98–107)
CO2 SERPL-SCNC: 27 MMOL/L (ref 22–29)
CREAT SERPL-MCNC: 0.81 MG/DL (ref 0.57–1)
DEPRECATED RDW RBC AUTO: 46.9 FL (ref 37–54)
EGFRCR SERPLBLD CKD-EPI 2021: 93.1 ML/MIN/1.73
ERYTHROCYTE [DISTWIDTH] IN BLOOD BY AUTOMATED COUNT: 13.4 % (ref 12.3–15.4)
GLUCOSE SERPL-MCNC: 116 MG/DL (ref 65–99)
HCT VFR BLD AUTO: 42.2 % (ref 34–46.6)
HGB BLD-MCNC: 14.2 G/DL (ref 12–15.9)
MCH RBC QN AUTO: 32 PG (ref 26.6–33)
MCHC RBC AUTO-ENTMCNC: 33.6 G/DL (ref 31.5–35.7)
MCV RBC AUTO: 95 FL (ref 79–97)
PLATELET # BLD AUTO: 244 10*3/MM3 (ref 140–450)
PMV BLD AUTO: 8.5 FL (ref 6–12)
POTASSIUM SERPL-SCNC: 4.3 MMOL/L (ref 3.5–5.2)
QT INTERVAL: 374 MS
QTC INTERVAL: 428 MS
RBC # BLD AUTO: 4.44 10*6/MM3 (ref 3.77–5.28)
SODIUM SERPL-SCNC: 138 MMOL/L (ref 136–145)
WBC NRBC COR # BLD: 8.2 10*3/MM3 (ref 3.4–10.8)

## 2022-08-29 PROCEDURE — 36415 COLL VENOUS BLD VENIPUNCTURE: CPT

## 2022-08-29 PROCEDURE — 85027 COMPLETE CBC AUTOMATED: CPT

## 2022-08-29 PROCEDURE — 93010 ELECTROCARDIOGRAM REPORT: CPT | Performed by: INTERNAL MEDICINE

## 2022-08-29 PROCEDURE — 93005 ELECTROCARDIOGRAM TRACING: CPT

## 2022-08-29 PROCEDURE — 80048 BASIC METABOLIC PNL TOTAL CA: CPT

## 2022-08-31 ENCOUNTER — ANESTHESIA EVENT (OUTPATIENT)
Dept: PERIOP | Facility: HOSPITAL | Age: 42
End: 2022-08-31

## 2022-09-01 ENCOUNTER — HOSPITAL ENCOUNTER (OUTPATIENT)
Facility: HOSPITAL | Age: 42
Setting detail: HOSPITAL OUTPATIENT SURGERY
Discharge: HOME | End: 2022-09-01
Attending: SURGERY | Admitting: SURGERY

## 2022-09-01 ENCOUNTER — ANESTHESIA (OUTPATIENT)
Dept: PERIOP | Facility: HOSPITAL | Age: 42
End: 2022-09-01

## 2022-09-01 VITALS
HEART RATE: 75 BPM | SYSTOLIC BLOOD PRESSURE: 163 MMHG | BODY MASS INDEX: 43.4 KG/M2 | TEMPERATURE: 98.3 F | DIASTOLIC BLOOD PRESSURE: 94 MMHG | RESPIRATION RATE: 20 BRPM | WEIGHT: 293 LBS | HEIGHT: 69 IN | OXYGEN SATURATION: 94 %

## 2022-09-01 DIAGNOSIS — K80.20 CALCULUS OF GALLBLADDER WITHOUT CHOLECYSTITIS WITHOUT OBSTRUCTION: Primary | ICD-10-CM

## 2022-09-01 DIAGNOSIS — K81.9 CHOLECYSTITIS: ICD-10-CM

## 2022-09-01 LAB
B-HCG UR QL: NEGATIVE
EXPIRATION DATE: NORMAL
INTERNAL NEGATIVE CONTROL: NORMAL
INTERNAL POSITIVE CONTROL: NORMAL
Lab: NORMAL

## 2022-09-01 PROCEDURE — 81025 URINE PREGNANCY TEST: CPT | Performed by: SURGERY

## 2022-09-01 PROCEDURE — 25010000002 FENTANYL CITRATE (PF) 50 MCG/ML SOLUTION: Performed by: NURSE ANESTHETIST, CERTIFIED REGISTERED

## 2022-09-01 PROCEDURE — 25010000002 PROPOFOL 10 MG/ML EMULSION: Performed by: NURSE ANESTHETIST, CERTIFIED REGISTERED

## 2022-09-01 PROCEDURE — 25010000002 ONDANSETRON PER 1 MG: Performed by: NURSE ANESTHETIST, CERTIFIED REGISTERED

## 2022-09-01 PROCEDURE — 25010000002 DEXAMETHASONE PER 1 MG: Performed by: NURSE ANESTHETIST, CERTIFIED REGISTERED

## 2022-09-01 PROCEDURE — 88304 TISSUE EXAM BY PATHOLOGIST: CPT | Performed by: SURGERY

## 2022-09-01 PROCEDURE — 25010000002 FENTANYL CITRATE (PF) 50 MCG/ML SOLUTION

## 2022-09-01 PROCEDURE — 25010000002 HYDROMORPHONE 1 MG/ML SOLUTION

## 2022-09-01 DEVICE — LIGACLIP 10-M/L, 10MM ENDOSCOPIC ROTATING MULTIPLE CLIP APPLIERS
Type: IMPLANTABLE DEVICE | Site: ABDOMEN | Status: FUNCTIONAL
Brand: LIGACLIP

## 2022-09-01 RX ORDER — HYDROMORPHONE HYDROCHLORIDE 1 MG/ML
0.5 INJECTION, SOLUTION INTRAMUSCULAR; INTRAVENOUS; SUBCUTANEOUS
Status: DISCONTINUED | OUTPATIENT
Start: 2022-09-01 | End: 2022-09-01 | Stop reason: HOSPADM

## 2022-09-01 RX ORDER — PROMETHAZINE HYDROCHLORIDE 25 MG/1
25 TABLET ORAL ONCE AS NEEDED
Status: DISCONTINUED | OUTPATIENT
Start: 2022-09-01 | End: 2022-09-01 | Stop reason: HOSPADM

## 2022-09-01 RX ORDER — ONDANSETRON 2 MG/ML
4 INJECTION INTRAMUSCULAR; INTRAVENOUS ONCE AS NEEDED
Status: DISCONTINUED | OUTPATIENT
Start: 2022-09-01 | End: 2022-09-01 | Stop reason: HOSPADM

## 2022-09-01 RX ORDER — SIMETHICONE 80 MG
TABLET,CHEWABLE ORAL
Status: COMPLETED
Start: 2022-09-01 | End: 2022-09-01

## 2022-09-01 RX ORDER — ROCURONIUM BROMIDE 10 MG/ML
INJECTION, SOLUTION INTRAVENOUS AS NEEDED
Status: DISCONTINUED | OUTPATIENT
Start: 2022-09-01 | End: 2022-09-01 | Stop reason: SURG

## 2022-09-01 RX ORDER — BUPIVACAINE HYDROCHLORIDE AND EPINEPHRINE 2.5; 5 MG/ML; UG/ML
INJECTION, SOLUTION EPIDURAL; INFILTRATION; INTRACAUDAL; PERINEURAL AS NEEDED
Status: DISCONTINUED | OUTPATIENT
Start: 2022-09-01 | End: 2022-09-01 | Stop reason: HOSPADM

## 2022-09-01 RX ORDER — ONDANSETRON 2 MG/ML
INJECTION INTRAMUSCULAR; INTRAVENOUS AS NEEDED
Status: DISCONTINUED | OUTPATIENT
Start: 2022-09-01 | End: 2022-09-01 | Stop reason: SURG

## 2022-09-01 RX ORDER — HYDRALAZINE HYDROCHLORIDE 20 MG/ML
5 INJECTION INTRAMUSCULAR; INTRAVENOUS
Status: DISCONTINUED | OUTPATIENT
Start: 2022-09-01 | End: 2022-09-01 | Stop reason: HOSPADM

## 2022-09-01 RX ORDER — CEFAZOLIN SODIUM IN 0.9 % NACL 3 G/100 ML
3 INTRAVENOUS SOLUTION, PIGGYBACK (ML) INTRAVENOUS ONCE
Status: COMPLETED | OUTPATIENT
Start: 2022-09-01 | End: 2022-09-01

## 2022-09-01 RX ORDER — FENTANYL CITRATE 50 UG/ML
INJECTION, SOLUTION INTRAMUSCULAR; INTRAVENOUS
Status: COMPLETED
Start: 2022-09-01 | End: 2022-09-01

## 2022-09-01 RX ORDER — FAMOTIDINE 10 MG/ML
20 INJECTION, SOLUTION INTRAVENOUS ONCE
Status: CANCELLED | OUTPATIENT
Start: 2022-09-01 | End: 2022-09-01

## 2022-09-01 RX ORDER — OXYCODONE HYDROCHLORIDE AND ACETAMINOPHEN 5; 325 MG/1; MG/1
1 TABLET ORAL EVERY 4 HOURS PRN
Qty: 10 TABLET | Refills: 0 | Status: SHIPPED | OUTPATIENT
Start: 2022-09-01 | End: 2022-10-05

## 2022-09-01 RX ORDER — FENTANYL CITRATE 50 UG/ML
50 INJECTION, SOLUTION INTRAMUSCULAR; INTRAVENOUS
Status: COMPLETED | OUTPATIENT
Start: 2022-09-01 | End: 2022-09-01

## 2022-09-01 RX ORDER — LABETALOL HYDROCHLORIDE 5 MG/ML
5 INJECTION, SOLUTION INTRAVENOUS
Status: DISCONTINUED | OUTPATIENT
Start: 2022-09-01 | End: 2022-09-01 | Stop reason: HOSPADM

## 2022-09-01 RX ORDER — DEXAMETHASONE SODIUM PHOSPHATE 4 MG/ML
INJECTION, SOLUTION INTRA-ARTICULAR; INTRALESIONAL; INTRAMUSCULAR; INTRAVENOUS; SOFT TISSUE AS NEEDED
Status: DISCONTINUED | OUTPATIENT
Start: 2022-09-01 | End: 2022-09-01 | Stop reason: SURG

## 2022-09-01 RX ORDER — OXYCODONE HYDROCHLORIDE AND ACETAMINOPHEN 5; 325 MG/1; MG/1
1 TABLET ORAL ONCE AS NEEDED
Status: DISCONTINUED | OUTPATIENT
Start: 2022-09-01 | End: 2022-09-01 | Stop reason: HOSPADM

## 2022-09-01 RX ORDER — SIMETHICONE 80 MG
80 TABLET,CHEWABLE ORAL 4 TIMES DAILY PRN
Status: DISCONTINUED | OUTPATIENT
Start: 2022-09-01 | End: 2022-09-01 | Stop reason: HOSPADM

## 2022-09-01 RX ORDER — NALOXONE HCL 0.4 MG/ML
0.4 VIAL (ML) INJECTION AS NEEDED
Status: DISCONTINUED | OUTPATIENT
Start: 2022-09-01 | End: 2022-09-01 | Stop reason: HOSPADM

## 2022-09-01 RX ORDER — DROPERIDOL 2.5 MG/ML
0.62 INJECTION, SOLUTION INTRAMUSCULAR; INTRAVENOUS ONCE AS NEEDED
Status: DISCONTINUED | OUTPATIENT
Start: 2022-09-01 | End: 2022-09-01 | Stop reason: HOSPADM

## 2022-09-01 RX ORDER — LABETALOL HYDROCHLORIDE 5 MG/ML
INJECTION, SOLUTION INTRAVENOUS
Status: DISCONTINUED
Start: 2022-09-01 | End: 2022-09-01 | Stop reason: WASHOUT

## 2022-09-01 RX ORDER — DROPERIDOL 2.5 MG/ML
0.62 INJECTION, SOLUTION INTRAMUSCULAR; INTRAVENOUS
Status: DISCONTINUED | OUTPATIENT
Start: 2022-09-01 | End: 2022-09-01 | Stop reason: HOSPADM

## 2022-09-01 RX ORDER — MIDAZOLAM HYDROCHLORIDE 1 MG/ML
1 INJECTION INTRAMUSCULAR; INTRAVENOUS
Status: DISCONTINUED | OUTPATIENT
Start: 2022-09-01 | End: 2022-09-01 | Stop reason: HOSPADM

## 2022-09-01 RX ORDER — FAMOTIDINE 20 MG/1
20 TABLET, FILM COATED ORAL ONCE
Status: COMPLETED | OUTPATIENT
Start: 2022-09-01 | End: 2022-09-01

## 2022-09-01 RX ORDER — IPRATROPIUM BROMIDE AND ALBUTEROL SULFATE 2.5; .5 MG/3ML; MG/3ML
3 SOLUTION RESPIRATORY (INHALATION) ONCE AS NEEDED
Status: DISCONTINUED | OUTPATIENT
Start: 2022-09-01 | End: 2022-09-01 | Stop reason: HOSPADM

## 2022-09-01 RX ORDER — PROPOFOL 10 MG/ML
VIAL (ML) INTRAVENOUS CONTINUOUS PRN
Status: DISCONTINUED | OUTPATIENT
Start: 2022-09-01 | End: 2022-09-01 | Stop reason: SURG

## 2022-09-01 RX ORDER — LIDOCAINE HYDROCHLORIDE 10 MG/ML
INJECTION, SOLUTION EPIDURAL; INFILTRATION; INTRACAUDAL; PERINEURAL AS NEEDED
Status: DISCONTINUED | OUTPATIENT
Start: 2022-09-01 | End: 2022-09-01 | Stop reason: SURG

## 2022-09-01 RX ORDER — PROMETHAZINE HYDROCHLORIDE 25 MG/1
25 SUPPOSITORY RECTAL ONCE AS NEEDED
Status: DISCONTINUED | OUTPATIENT
Start: 2022-09-01 | End: 2022-09-01 | Stop reason: HOSPADM

## 2022-09-01 RX ORDER — SODIUM CHLORIDE 0.9 % (FLUSH) 0.9 %
3 SYRINGE (ML) INJECTION EVERY 12 HOURS SCHEDULED
Status: DISCONTINUED | OUTPATIENT
Start: 2022-09-01 | End: 2022-09-01 | Stop reason: HOSPADM

## 2022-09-01 RX ORDER — LIDOCAINE HYDROCHLORIDE 10 MG/ML
0.5 INJECTION, SOLUTION EPIDURAL; INFILTRATION; INTRACAUDAL; PERINEURAL ONCE AS NEEDED
Status: COMPLETED | OUTPATIENT
Start: 2022-09-01 | End: 2022-09-01

## 2022-09-01 RX ORDER — HYDROCODONE BITARTRATE AND ACETAMINOPHEN 5; 325 MG/1; MG/1
TABLET ORAL
Status: COMPLETED
Start: 2022-09-01 | End: 2022-09-01

## 2022-09-01 RX ORDER — MAGNESIUM HYDROXIDE 1200 MG/15ML
LIQUID ORAL AS NEEDED
Status: DISCONTINUED | OUTPATIENT
Start: 2022-09-01 | End: 2022-09-01 | Stop reason: HOSPADM

## 2022-09-01 RX ORDER — SODIUM CHLORIDE 0.9 % (FLUSH) 0.9 %
3-10 SYRINGE (ML) INJECTION AS NEEDED
Status: DISCONTINUED | OUTPATIENT
Start: 2022-09-01 | End: 2022-09-01 | Stop reason: HOSPADM

## 2022-09-01 RX ORDER — SODIUM CHLORIDE 0.9 % (FLUSH) 0.9 %
10 SYRINGE (ML) INJECTION AS NEEDED
Status: CANCELLED | OUTPATIENT
Start: 2022-09-01

## 2022-09-01 RX ORDER — MEPERIDINE HYDROCHLORIDE 25 MG/ML
12.5 INJECTION INTRAMUSCULAR; INTRAVENOUS; SUBCUTANEOUS
Status: DISCONTINUED | OUTPATIENT
Start: 2022-09-01 | End: 2022-09-01 | Stop reason: HOSPADM

## 2022-09-01 RX ORDER — HYDROCODONE BITARTRATE AND ACETAMINOPHEN 5; 325 MG/1; MG/1
1 TABLET ORAL ONCE AS NEEDED
Status: DISCONTINUED | OUTPATIENT
Start: 2022-09-01 | End: 2022-09-01 | Stop reason: HOSPADM

## 2022-09-01 RX ORDER — SODIUM CHLORIDE 0.9 % (FLUSH) 0.9 %
10 SYRINGE (ML) INJECTION EVERY 12 HOURS SCHEDULED
Status: CANCELLED | OUTPATIENT
Start: 2022-09-01

## 2022-09-01 RX ORDER — FENTANYL CITRATE 50 UG/ML
INJECTION, SOLUTION INTRAMUSCULAR; INTRAVENOUS AS NEEDED
Status: DISCONTINUED | OUTPATIENT
Start: 2022-09-01 | End: 2022-09-01 | Stop reason: SURG

## 2022-09-01 RX ORDER — SODIUM CHLORIDE, SODIUM LACTATE, POTASSIUM CHLORIDE, CALCIUM CHLORIDE 600; 310; 30; 20 MG/100ML; MG/100ML; MG/100ML; MG/100ML
9 INJECTION, SOLUTION INTRAVENOUS CONTINUOUS
Status: DISCONTINUED | OUTPATIENT
Start: 2022-09-01 | End: 2022-09-01 | Stop reason: HOSPADM

## 2022-09-01 RX ORDER — DOCUSATE SODIUM 100 MG/1
100 CAPSULE, LIQUID FILLED ORAL 2 TIMES DAILY PRN
Qty: 30 CAPSULE | Refills: 1 | Status: SHIPPED | OUTPATIENT
Start: 2022-09-01 | End: 2023-09-01

## 2022-09-01 RX ORDER — SODIUM CHLORIDE 9 MG/ML
INJECTION, SOLUTION INTRAVENOUS AS NEEDED
Status: DISCONTINUED | OUTPATIENT
Start: 2022-09-01 | End: 2022-09-01 | Stop reason: HOSPADM

## 2022-09-01 RX ORDER — BUPIVACAINE HCL/0.9 % NACL/PF 0.125 %
PLASTIC BAG, INJECTION (ML) EPIDURAL AS NEEDED
Status: DISCONTINUED | OUTPATIENT
Start: 2022-09-01 | End: 2022-09-01 | Stop reason: SURG

## 2022-09-01 RX ADMIN — FENTANYL CITRATE 50 MCG: 50 INJECTION, SOLUTION INTRAMUSCULAR; INTRAVENOUS at 11:59

## 2022-09-01 RX ADMIN — SIMETHICONE 80 MG: 80 TABLET, CHEWABLE ORAL at 13:34

## 2022-09-01 RX ADMIN — Medication 100 MCG: at 10:44

## 2022-09-01 RX ADMIN — PROPOFOL 300 MG: 10 INJECTION, EMULSION INTRAVENOUS at 10:34

## 2022-09-01 RX ADMIN — ONDANSETRON 4 MG: 2 INJECTION INTRAMUSCULAR; INTRAVENOUS at 10:42

## 2022-09-01 RX ADMIN — LIDOCAINE HYDROCHLORIDE 100 MG: 10 INJECTION, SOLUTION EPIDURAL; INFILTRATION; INTRACAUDAL; PERINEURAL at 10:34

## 2022-09-01 RX ADMIN — FENTANYL CITRATE 100 MCG: 50 INJECTION, SOLUTION INTRAMUSCULAR; INTRAVENOUS at 10:34

## 2022-09-01 RX ADMIN — FAMOTIDINE 20 MG: 20 TABLET ORAL at 08:35

## 2022-09-01 RX ADMIN — HYDROMORPHONE HYDROCHLORIDE 0.5 MG: 1 INJECTION, SOLUTION INTRAMUSCULAR; INTRAVENOUS; SUBCUTANEOUS at 12:49

## 2022-09-01 RX ADMIN — SODIUM CHLORIDE, POTASSIUM CHLORIDE, SODIUM LACTATE AND CALCIUM CHLORIDE 9 ML/HR: 600; 310; 30; 20 INJECTION, SOLUTION INTRAVENOUS at 08:20

## 2022-09-01 RX ADMIN — HYDROMORPHONE HYDROCHLORIDE 0.5 MG: 1 INJECTION, SOLUTION INTRAMUSCULAR; INTRAVENOUS; SUBCUTANEOUS at 14:59

## 2022-09-01 RX ADMIN — FENTANYL CITRATE 50 MCG: 50 INJECTION, SOLUTION INTRAMUSCULAR; INTRAVENOUS at 12:10

## 2022-09-01 RX ADMIN — DEXAMETHASONE SODIUM PHOSPHATE 4 MG: 4 INJECTION, SOLUTION INTRA-ARTICULAR; INTRALESIONAL; INTRAMUSCULAR; INTRAVENOUS; SOFT TISSUE at 10:42

## 2022-09-01 RX ADMIN — HYDROCODONE BITARTRATE AND ACETAMINOPHEN 1 TABLET: 5; 325 TABLET ORAL at 14:52

## 2022-09-01 RX ADMIN — FENTANYL CITRATE 50 MCG: 50 INJECTION, SOLUTION INTRAMUSCULAR; INTRAVENOUS at 13:45

## 2022-09-01 RX ADMIN — HYDROMORPHONE HYDROCHLORIDE 0.5 MG: 1 INJECTION, SOLUTION INTRAMUSCULAR; INTRAVENOUS; SUBCUTANEOUS at 12:25

## 2022-09-01 RX ADMIN — ROCURONIUM BROMIDE 100 MG: 50 INJECTION, SOLUTION INTRAVENOUS at 10:34

## 2022-09-01 RX ADMIN — PROPOFOL 25 MCG/KG/MIN: 10 INJECTION, EMULSION INTRAVENOUS at 10:44

## 2022-09-01 RX ADMIN — Medication 100 MCG: at 10:45

## 2022-09-01 RX ADMIN — Medication 3 G: at 10:39

## 2022-09-01 RX ADMIN — LIDOCAINE HYDROCHLORIDE 0.5 ML: 10 INJECTION, SOLUTION EPIDURAL; INFILTRATION; INTRACAUDAL; PERINEURAL at 08:20

## 2022-09-01 RX ADMIN — PROPOFOL 50 MG: 10 INJECTION, EMULSION INTRAVENOUS at 11:38

## 2022-09-01 RX ADMIN — FENTANYL CITRATE 50 MCG: 50 INJECTION, SOLUTION INTRAMUSCULAR; INTRAVENOUS at 14:04

## 2022-09-01 RX ADMIN — SUGAMMADEX 200 MG: 100 INJECTION, SOLUTION INTRAVENOUS at 11:33

## 2022-09-01 RX ADMIN — Medication 80 MG: at 13:34

## 2022-09-01 NOTE — BRIEF OP NOTE
CHOLECYSTECTOMY LAPAROSCOPIC  Progress Note    Starr Ruiz  9/1/2022    Pre-op Diagnosis:   Cholelithiasis       Post-Op Diagnosis Codes:     * Cholelithiasis [K80.20]    Procedure/CPT® Codes:        Procedure(s):  CHOLECYSTECTOMY LAPAROSCOPIC    Surgeon(s):  Tomas Hamm MD    Anesthesia: General    Staff:   Circulator: Crystal Bullock RN; Emilee Briseno RN  Physician Assistant: Tessa Michelle PA-C  Scrub Person: Randal Umana RN; Wanda Sanchez  Nursing Assistant: Joy Lua PCT         Estimated Blood Loss: minimal    Urine Voided: * No values recorded between 9/1/2022 10:28 AM and 9/1/2022 11:36 AM *    Specimens:                Specimens     ID Source Type Tests Collected By Collected At Frozen?    A Gallbladder Tissue · TISSUE PATHOLOGY EXAM   Tomas Hamm MD 9/1/22 1100     Description: gallbladder and its contents    This specimen was not marked as sent.                Drains: * No LDAs found *    Findings: Gallbladder with large gallstone without evidence of active infection        Complications: none          Tomas Hamm MD     Date: 9/1/2022  Time: 11:36 EDT

## 2022-09-01 NOTE — ANESTHESIA PROCEDURE NOTES
Airway  Urgency: elective    Date/Time: 9/1/2022 10:35 AM  Airway not difficult    General Information and Staff    Patient location during procedure: OR  CRNA/CAA: Tao Davis III, CRNA    Indications and Patient Condition  Indications for airway management: airway protection    Preoxygenated: yes  MILS not maintained throughout  Mask difficulty assessment: 0 - not attempted    Final Airway Details  Final airway type: endotracheal airway      Successful airway: ETT  Cuffed: yes   Successful intubation technique: direct laryngoscopy  Blade: Hortencia  Blade size: 3  ETT size (mm): 7.5  Cormack-Lehane Classification: grade IIa - partial view of glottis  Placement verified by: chest auscultation and capnometry   Measured from: lips  ETT/EBT  to lips (cm): 21  Number of attempts at approach: 1  Assessment: lips, teeth, and gum same as pre-op and atraumatic intubation    Additional Comments  Negative epigastric sounds, Breath sound equal bilaterally with symmetric chest rise and fall.

## 2022-09-01 NOTE — ANESTHESIA PREPROCEDURE EVALUATION
Anesthesia Evaluation                  Airway   Mallampati: I  TM distance: >3 FB  Neck ROM: full  No difficulty expected  Dental      Pulmonary    Cardiovascular     ECG reviewed    (+) hypertension,       Neuro/Psych  (+) psychiatric history Anxiety and Depression,    GI/Hepatic/Renal/Endo    (+) morbid obesity,      Musculoskeletal     Abdominal    Substance History      OB/GYN          Other                        Anesthesia Plan    ASA 3     general     intravenous induction     Anesthetic plan, risks, benefits, and alternatives have been provided, discussed and informed consent has been obtained with: patient.    Plan discussed with CRNA.        CODE STATUS:

## 2022-09-01 NOTE — H&P
Pre-Op H&P  Starr Ruiz  [id number]  1980      Chief complaint: Abd pain      Subjective:  Patient is a 42 y.o.female presents for scheduled surgery by Dr. Hamm. She anticipates a CHOLECYSTECTOMY LAPAROSCOPIC today. She has a 2 month history of abd pain, nausea and diarrhea. She had CT abd 6/21/22 that showed gallbladder sludge or noncalcified gallstones.      Review of Systems:  Constitutional-- No fever, chills or sweats. No fatigue.  CV-- No chest pain, palpitation or syncope. +HTN  Resp-- No SOB, cough, hemoptysis  Skin--No rashes or lesions      Allergies:   Allergies   Allergen Reactions   • Lisinopril Cough         Home Meds:  Medications Prior to Admission   Medication Sig Dispense Refill Last Dose   • albuterol sulfate  (90 Base) MCG/ACT inhaler Inhale 2 puffs Every 4 (Four) Hours As Needed for Wheezing or Shortness of Air. 18 g 5 8/31/2022 at 1800   • escitalopram (LEXAPRO) 10 MG tablet Take 1 tablet by mouth Daily. 90 tablet 3 8/31/2022 at 1200   • hydroCHLOROthiazide (HYDRODIURIL) 12.5 MG tablet Take 1 tablet by mouth Daily. 90 tablet 3 8/31/2022 at 1200   • losartan (COZAAR) 100 MG tablet Take 1 tablet by mouth Daily. 90 tablet 3 8/31/2022 at 1200   • traZODone (DESYREL) 50 MG tablet Take 3 tablets by mouth Every Night. 90 tablet 3 8/31/2022 at 2100         PMH:   Past Medical History:   Diagnosis Date   • COVID     01/2022, 08/2022   • Depression with anxiety 1998   • Essential hypertension 1998   • H/O trichomoniasis 2022   • History of chlamydia 2021   • Uterine fibroid 2016     PSH:    Past Surgical History:   Procedure Laterality Date   • D & C HYSTEROSCOPY WITH NOVASURE ENDOMETRIAL ABLATION AND MYOSURE  2017    [facility]   • LAPAROSCOPIC TUBAL LIGATION  2004    Dr. Puente   • ALLYSSA  1998   • RECTAL FISSURE INCISION AND DRAINAGE  2019   • WISDOM TOOTH EXTRACTION  1996       Immunization History:  Influenza: No  Pneumococcal: No  Tetanus: Unknown  Covid : No    Social  "History:   Tobacco:   Social History     Tobacco Use   Smoking Status Current Every Day Smoker   • Packs/day: 1.00   • Years: 25.00   • Pack years: 25.00   • Types: Cigarettes   • Start date: 1995   Smokeless Tobacco Never Used      Alcohol:     Social History     Substance and Sexual Activity   Alcohol Use Not Currently    Comment: rare         Physical Exam:/96 (BP Location: Right arm, Patient Position: Lying)   Pulse 88   Temp 97.3 °F (36.3 °C) (Temporal)   Resp 18   Ht 175.3 cm (69\")   Wt (!) 168 kg (370 lb)   SpO2 95%   BMI 54.64 kg/m²       General Appearance:    Alert, cooperative, no distress, appears stated age   Head:    Normocephalic, without obvious abnormality, atraumatic   Lungs:     Clear to auscultation bilaterally, respirations unlabored    Heart:   Regular rate and rhythm, S1 and S2 normal    Abdomen:    Soft without tenderness. +obese   Extremities:   Extremities normal, atraumatic, no cyanosis or edema   Skin:   Skin color, texture, turgor normal, no rashes or lesions   Neurologic:   Grossly intact     Results Review:     LABS:  Lab Results   Component Value Date    WBC 8.20 08/29/2022    HGB 14.2 08/29/2022    HCT 42.2 08/29/2022    MCV 95.0 08/29/2022     08/29/2022    NEUTROABS 8.00 (H) 06/26/2022    GLUCOSE 116 (H) 08/29/2022    BUN 13 08/29/2022    CREATININE 0.81 08/29/2022    EGFRIFNONA 78 09/05/2021     08/29/2022    K 4.3 08/29/2022     08/29/2022    CO2 27.0 08/29/2022    PHOS 3.4 09/05/2021    CALCIUM 9.3 08/29/2022    ALBUMIN 3.70 06/26/2022    AST 13 06/26/2022    ALT 12 06/26/2022    BILITOT 0.2 06/26/2022       RADIOLOGY:  6/21/22 CT abd:  FINDINGS:  The included lower lungs appear grossly clear. There is diffuse fatty  liver change. No hepatic lesions are identified. There appears to be a  small amount of gallbladder \"sludge \"or noncalcified gallstones in the  otherwise normal-appearing gallbladder. No evidence of biliary ductal  dilatation is " appreciated. Spleen is not enlarged. No significant  abnormalities are seen of the pancreas, adrenal glands, or kidneys. No  upper abdominal free air, ascites, adenopathy or acute inflammatory  focus is appreciated.     Regarding the lower abdomen and pelvis, the terminal ileum and cecum  appear grossly normal. The appendix is probably the very small  curvilinear structure seen at the cecal tip from images 71-74 and  appears normal. Uterus is heterogeneous, with a low  density/hypoenhancing 2.7 cm lesion of the left lower myometrium. Left  ovary is not enlarged. Right ovary contains an approximately 3.5 cm  nonenhancing low-density lesion presumably a cyst containing  proteinaceous fluid. No intrapelvic free fluid or inflammatory focus is  identified. Bladder is nondistended and normal in appearance. Delayed  venous phase images show no evidence of obstructive uropathy. Bony  structures appear to be intact.      IMPRESSION:     1. 3.5 cm nonenhancing right ovarian lesion presumably a cyst containing  proteinaceous fluid.  2. Low-density 2.7 cm fibroid or subendometrial cyst of the left lower  uterine segment. Pelvic ultrasound is suggested for further evaluation,  and by history has already been ordered.  3. Gallbladder sludge or noncalcified gallstones.  4. No clearly acute inflammatory process or other acute disease is seen  elsewhere.    I reviewed the patient's new clinical results.    Cancer Staging (if applicable)  Cancer Patient: __ yes __no __unknown; If yes, clinical stage T:__ N:__M:__, stage group or __N/A      Impression: Calculus of gallbladder without cholecystitis or obstruction      Plan: CHOLECYSTECTOMY LAPAROSCOPIC      Dora Harper, WILMA   9/1/2022   08:40 EDT

## 2022-09-01 NOTE — OP NOTE
Operative Report    Patient Name:  Starr Ruiz  YOB: 1980  [id number]  9/1/2022      PREOPERATIVE DIAGNOSIS: Cholelithiasis      POSTOPERATIVE DIAGNOSIS: Same        PROCEDURE PERFORMED:     Laparoscopic cholecystectomy        SURGEON: Tomas Hamm MD      ASSISTANT: None        SPECIMENS: Gallbladder and contents        ANESTHESIA: General.       EBL: Minimal        FINDINGS:     Gallbladder with large gallstone without evidence of active infection         INDICATIONS:      The patient is a 42 y.o. female with a history of abdominal pain, concerning for gallbladder disease. Pre-operative imaging including CT scan confirmed the diagnosis of cholelithiasis. The risks and benefits of laparoscopic cholecystectomy were discussed with the patient and they agreed to proceed.        DESCRIPTION OF PROCEDURE:     After obtaining informed consent, the patient was taken to the operating room and placed in the supine position. After appropriate DVT and antibiotic prophylaxis, general anesthesia was induced. The abdomen was prepped and draped in standard sterile fashion, and after infiltrating the skin with local anesthetic, a 12mm skin incision was made superior to the umbilicus. Blunt dissection was carried down to the anterior abdominal wall, which was grasped with a Kocher clamp and elevated anteriorly. A vertical midline incision was made in the fascia, and blunt dissection was carried down into the peritoneal cavity. A stay suture of 0 Vicryl was then placed in figure-of-eight fashion around the defect, and a blunt trocar advanced without difficulty into the abdominal cavity. The abdomen was insufflated with carbon dioxide gas to a pressure of 15 mmHg, and a laparoscope advanced through the trocar and the abdominal contents were inspected. There was no evidence of bowel, bladder, or visceral injury with entrance of the trocar. At this point, after infiltrating the skin with local  "anesthetic, a standard laparoscopic cholecystectomy trocar placement schema was followed.     The gallbladder was grasped and elevated superiorly. Using meticulous blunt dissection, the cystic duct and cystic artery were bluntly dissected free of other structures and clearly identified using the \"Critical View\" technique. The cystic duct was then clipped at its junction with the infundibulum of the gallbladder, with two clips placed proximally, and the duct was divided between the clips. The cystic artery was then clipped twice proximally and once distally, and divided between clips.     The gallbladder was then dissected free of the gallbladder fossa using a combination of electrocautery and blunt dissection. The gallbladder was then placed in an Endo Catch bag, and removed from the supraumbilical trocar site. It was inspected on the back table, correlated with intra-operative findings, and passed off as specimen.     The right upper quadrant was then inspected. The cystic duct and cystic artery stumps were intact without bleeding or biliary leak. The right upper quadrant was irrigated with saline until clear. The abdomen was deflated and reinsufflated to make sure pneumoperitoneum was not tamponading any bleeding and there was none. The abdomen was again irrigated with saline until clear, and all trocars removed under direct and laparoscopic visualization. The fascia at the inferiormost incision was closed using the previously placed 0 Vicryl suture. The wounds were irrigated with normal saline, and closed in each area using absorbable subcuticular suture. The incisions were dressed in standard sterile fashion and covered with dry dressings. The patient recovered from anesthesia, was extubated in the operating room, and transferred to the PACU in stable condition.  All sponge and needle counts were correct times two at the completion of the procedure. There were no immediate complications.     EXTENUATING " CIRCUMSTANCES:  This procedure took approximately 30 minutes longer than a typical laparoscopic cholecystectomy due to patient's morbid obesity with BMI >50.    Tomas Hamm MD  9/1/2022  11:37 EDT

## 2022-09-01 NOTE — ANESTHESIA POSTPROCEDURE EVALUATION
Patient: Starr Ruiz    Procedure Summary     Date: 09/01/22 Room / Location:  [facility] /  [facility]    Anesthesia Start: 1029 Anesthesia Stop: 1152    Procedure: CHOLECYSTECTOMY LAPAROSCOPIC (N/A Abdomen) Diagnosis: Cholelithiasis    Surgeons: Tomas Hamm MD Provider: Rashaad Bernard MD    Anesthesia Type: general ASA Status: 3          Anesthesia Type: general    Vitals  No vitals data found for the desired time range.          Post Anesthesia Care and Evaluation    Patient location during evaluation: PACU  Patient participation: complete - patient participated  Level of consciousness: awake and alert  Pain management: adequate    Airway patency: patent  Anesthetic complications: No anesthetic complications  PONV Status: none  Cardiovascular status: hemodynamically stable and acceptable  Respiratory status: nonlabored ventilation, acceptable, nasal cannula and airway suctioned  Hydration status: acceptable    Comments: To PACU on O2NC, breathing comfortably.  Report to PACU RN at bedside. VSS.

## 2022-09-02 LAB
CYTO UR: NORMAL
LAB AP CASE REPORT: NORMAL
LAB AP CLINICAL INFORMATION: NORMAL
PATH REPORT.FINAL DX SPEC: NORMAL
PATH REPORT.GROSS SPEC: NORMAL

## 2022-09-07 ENCOUNTER — TRANSCRIBE ORDERS (OUTPATIENT)
Dept: LAB | Facility: HOSPITAL | Age: 42
End: 2022-09-07

## 2022-09-07 ENCOUNTER — LAB (OUTPATIENT)
Dept: LAB | Facility: HOSPITAL | Age: 42
End: 2022-09-07

## 2022-09-07 DIAGNOSIS — R10.11 ABDOMINAL PAIN, RIGHT UPPER QUADRANT: ICD-10-CM

## 2022-09-07 DIAGNOSIS — R10.11 ABDOMINAL PAIN, RIGHT UPPER QUADRANT: Primary | ICD-10-CM

## 2022-09-07 LAB
ALBUMIN SERPL-MCNC: 3.9 G/DL (ref 3.5–5.2)
ALBUMIN/GLOB SERPL: 1.3 G/DL
ALP SERPL-CCNC: 81 U/L (ref 39–117)
ALT SERPL W P-5'-P-CCNC: 16 U/L (ref 1–33)
ANION GAP SERPL CALCULATED.3IONS-SCNC: 10 MMOL/L (ref 5–15)
AST SERPL-CCNC: 17 U/L (ref 1–32)
BASOPHILS # BLD AUTO: 0.06 10*3/MM3 (ref 0–0.2)
BASOPHILS NFR BLD AUTO: 0.7 % (ref 0–1.5)
BILIRUB SERPL-MCNC: 0.2 MG/DL (ref 0–1.2)
BUN SERPL-MCNC: 10 MG/DL (ref 6–20)
BUN/CREAT SERPL: 13.2 (ref 7–25)
CALCIUM SPEC-SCNC: 9.3 MG/DL (ref 8.6–10.5)
CHLORIDE SERPL-SCNC: 105 MMOL/L (ref 98–107)
CO2 SERPL-SCNC: 26 MMOL/L (ref 22–29)
CREAT SERPL-MCNC: 0.76 MG/DL (ref 0.57–1)
DEPRECATED RDW RBC AUTO: 48.3 FL (ref 37–54)
EGFRCR SERPLBLD CKD-EPI 2021: 100.5 ML/MIN/1.73
EOSINOPHIL # BLD AUTO: 0.41 10*3/MM3 (ref 0–0.4)
EOSINOPHIL NFR BLD AUTO: 4.5 % (ref 0.3–6.2)
ERYTHROCYTE [DISTWIDTH] IN BLOOD BY AUTOMATED COUNT: 13.7 % (ref 12.3–15.4)
GLOBULIN UR ELPH-MCNC: 3.1 GM/DL
GLUCOSE SERPL-MCNC: 99 MG/DL (ref 65–99)
HCT VFR BLD AUTO: 39.9 % (ref 34–46.6)
HGB BLD-MCNC: 13.4 G/DL (ref 12–15.9)
IMM GRANULOCYTES # BLD AUTO: 0.1 10*3/MM3 (ref 0–0.05)
IMM GRANULOCYTES NFR BLD AUTO: 1.1 % (ref 0–0.5)
LYMPHOCYTES # BLD AUTO: 2.07 10*3/MM3 (ref 0.7–3.1)
LYMPHOCYTES NFR BLD AUTO: 22.5 % (ref 19.6–45.3)
MCH RBC QN AUTO: 32.3 PG (ref 26.6–33)
MCHC RBC AUTO-ENTMCNC: 33.6 G/DL (ref 31.5–35.7)
MCV RBC AUTO: 96.1 FL (ref 79–97)
MONOCYTES # BLD AUTO: 0.59 10*3/MM3 (ref 0.1–0.9)
MONOCYTES NFR BLD AUTO: 6.4 % (ref 5–12)
NEUTROPHILS NFR BLD AUTO: 5.95 10*3/MM3 (ref 1.7–7)
NEUTROPHILS NFR BLD AUTO: 64.8 % (ref 42.7–76)
NRBC BLD AUTO-RTO: 0 /100 WBC (ref 0–0.2)
PLATELET # BLD AUTO: 236 10*3/MM3 (ref 140–450)
PMV BLD AUTO: 8.8 FL (ref 6–12)
POTASSIUM SERPL-SCNC: 4.1 MMOL/L (ref 3.5–5.2)
PROT SERPL-MCNC: 7 G/DL (ref 6–8.5)
RBC # BLD AUTO: 4.15 10*6/MM3 (ref 3.77–5.28)
SODIUM SERPL-SCNC: 141 MMOL/L (ref 136–145)
WBC NRBC COR # BLD: 9.18 10*3/MM3 (ref 3.4–10.8)

## 2022-09-07 PROCEDURE — 85025 COMPLETE CBC W/AUTO DIFF WBC: CPT

## 2022-09-07 PROCEDURE — 36415 COLL VENOUS BLD VENIPUNCTURE: CPT

## 2022-09-07 PROCEDURE — 80053 COMPREHEN METABOLIC PANEL: CPT

## 2022-09-21 ENCOUNTER — TRANSCRIBE ORDERS (OUTPATIENT)
Dept: ADMINISTRATIVE | Facility: HOSPITAL | Age: 42
End: 2022-09-21

## 2022-09-21 DIAGNOSIS — K42.9 UMBILICAL HERNIA WITHOUT OBSTRUCTION OR GANGRENE: Primary | ICD-10-CM

## 2022-09-26 ENCOUNTER — HOSPITAL ENCOUNTER (OUTPATIENT)
Dept: CT IMAGING | Facility: HOSPITAL | Age: 42
Discharge: HOME | End: 2022-09-26
Admitting: SURGERY

## 2022-09-26 DIAGNOSIS — K42.9 UMBILICAL HERNIA WITHOUT OBSTRUCTION OR GANGRENE: ICD-10-CM

## 2022-09-26 PROCEDURE — 74176 CT ABD & PELVIS W/O CONTRAST: CPT

## 2022-09-30 DIAGNOSIS — G47.00 INSOMNIA, UNSPECIFIED TYPE: ICD-10-CM

## 2022-09-30 RX ORDER — TRAZODONE HYDROCHLORIDE 50 MG/1
150 TABLET ORAL NIGHTLY
Qty: 90 TABLET | Refills: 3 | Status: SHIPPED | OUTPATIENT
Start: 2022-09-30 | End: 2022-10-04 | Stop reason: SDUPTHER

## 2022-10-04 DIAGNOSIS — G47.00 INSOMNIA, UNSPECIFIED TYPE: ICD-10-CM

## 2022-10-04 RX ORDER — TRAZODONE HYDROCHLORIDE 50 MG/1
150 TABLET ORAL NIGHTLY
Qty: 90 TABLET | Refills: 3 | Status: SHIPPED | OUTPATIENT
Start: 2022-10-04 | End: 2022-10-05 | Stop reason: SDUPTHER

## 2022-10-05 ENCOUNTER — TELEPHONE (OUTPATIENT)
Dept: FAMILY MEDICINE CLINIC | Facility: CLINIC | Age: 42
End: 2022-10-05

## 2022-10-05 DIAGNOSIS — G47.00 INSOMNIA, UNSPECIFIED TYPE: ICD-10-CM

## 2022-10-05 RX ORDER — TRAZODONE HYDROCHLORIDE 50 MG/1
150 TABLET ORAL NIGHTLY
Qty: 90 TABLET | Refills: 3 | Status: SHIPPED | OUTPATIENT
Start: 2022-10-05

## 2022-10-05 NOTE — TELEPHONE ENCOUNTER
Caller: Starr Ruiz    Relationship: Self    Best call back number: [phone]    Requested Prescriptions:   traZODone (DESYREL) 50 MG tablet    Additional details provided by patient: PATIENT WOULD LIKE THIS PRESCRIPTION SENT TO THE [facility]/pharmacy #[id number] - [facility] [address] [phone] [facility] [phone] [facility] INSTEAD OF [facility].    PLEASE CALL AND LET THE PATIENT KNOW IF THIS IS POSSIBLE.    Arleen Gross Rep   10/05/22 11:09 EDT     THANK YOU.

## 2022-10-06 ENCOUNTER — TELEPHONE (OUTPATIENT)
Dept: FAMILY MEDICINE CLINIC | Facility: CLINIC | Age: 42
End: 2022-10-06

## 2022-10-10 ENCOUNTER — PREP FOR SURGERY (OUTPATIENT)
Dept: OTHER | Facility: HOSPITAL | Age: 42
End: 2022-10-10

## 2022-10-10 DIAGNOSIS — R06.2 WHEEZING: Primary | ICD-10-CM

## 2022-10-10 DIAGNOSIS — K92.2 GASTROINTESTINAL HEMORRHAGE, UNSPECIFIED GASTROINTESTINAL HEMORRHAGE TYPE: Primary | ICD-10-CM

## 2022-10-10 RX ORDER — ALBUTEROL SULFATE 90 UG/1
2 AEROSOL, METERED RESPIRATORY (INHALATION) EVERY 4 HOURS PRN
Qty: 18 G | Refills: 3 | Status: SHIPPED | OUTPATIENT
Start: 2022-10-10 | End: 2022-10-17 | Stop reason: SDUPTHER

## 2022-10-12 ENCOUNTER — OFFICE VISIT (OUTPATIENT)
Dept: OBSTETRICS AND GYNECOLOGY | Facility: CLINIC | Age: 42
End: 2022-10-12

## 2022-10-12 VITALS
BODY MASS INDEX: 55.38 KG/M2 | DIASTOLIC BLOOD PRESSURE: 80 MMHG | SYSTOLIC BLOOD PRESSURE: 136 MMHG | RESPIRATION RATE: 16 BRPM | WEIGHT: 293 LBS

## 2022-10-12 DIAGNOSIS — R10.2 PELVIC PAIN: ICD-10-CM

## 2022-10-12 DIAGNOSIS — R92.8 ABNORMAL MAMMOGRAM: ICD-10-CM

## 2022-10-12 DIAGNOSIS — R87.610 ASCUS WITH POSITIVE HIGH RISK HPV CERVICAL: ICD-10-CM

## 2022-10-12 DIAGNOSIS — R87.810 ASCUS WITH POSITIVE HIGH RISK HPV CERVICAL: ICD-10-CM

## 2022-10-12 DIAGNOSIS — Z01.411 ENCOUNTER FOR GYNECOLOGICAL EXAMINATION WITH ABNORMAL FINDING: Primary | ICD-10-CM

## 2022-10-12 DIAGNOSIS — D25.9 UTERINE LEIOMYOMA, UNSPECIFIED LOCATION: ICD-10-CM

## 2022-10-12 DIAGNOSIS — Z87.42 HISTORY OF OVARIAN CYST: ICD-10-CM

## 2022-10-12 PROCEDURE — 99396 PREV VISIT EST AGE 40-64: CPT | Performed by: NURSE PRACTITIONER

## 2022-10-12 NOTE — PROGRESS NOTES
Annual Visit     Patient Name: Starr Ruiz  : 1980   MRN: [id number]   Care Team: Patient Care Team:  Nahed Molina MD as PCP - General (Family Medicine)  Sarah Valentin APRN as Nurse Practitioner (Nurse Practitioner)  Zurdo Vasquez MD as Obstetrician (Obstetrics and Gynecology)    Chief Complaint:    Chief Complaint   Patient presents with   • Annual Exam       HPI: Starr Ruiz is a 42 y.o. year old  presenting to be seen for her gynecologic exam.   S/p BTL   Endometrial ablation in  - amenorrhea > 1 yr then light period flow x 2-3 days in Sept with severe dysmenorrhea   Seen in 2022 with c/o severe RLQ pain   USTV revealed 6cm uterine fibroid   Ovaries not seen - CT scan revealed a right ovarian cyst 3.5cm size   States RLQ pain is still present but intermittent now   S/p cholecystectomy in 2022 - no improvement in pain since   No vaginal or urinary c/o   Hasn't been sexually active since before  she would really like a hysterectomy - states that was offered when she had the ablation but she chose the ablation instead and wishes she hadn't     Hx LEEP at 19 - unsure of what level of dysplasia she had   Pap smear 2021 ASCUS with positive non 16/18 HPV   Colpo attempted but inadequate   Rpt pap smear 2022 WNL     Mammogram 2021 birads 0   Right breast dx mammogram birads 4 - states she had to reschedule the biopsy and then had to cancel and never rescheduled     She has had a very stressful few months   She was evicted from her home and had to move in with her parents   Her son and daughter are not speaking to one another and she is having to attend frequent court hearings with her son       Subjective      I have reviewed the patients family history, social history, past medical history, past surgical history and have updated it as appropriate.    /80   Resp 16   Wt (!) 170 kg (375 lb)   LMP  (LMP Unknown)   BMI  55.38 kg/m²     BMI reviewed: Body mass index is 55.38 kg/m².      Objective     Physical Exam    Neuro: alert and oriented to person, place and time   General:  alert; cooperative; well developed; well nourished   Skin:  No suspicious lesions seen   Thyroid: normal to inspection and palpation   Lungs:  breathing is unlabored  clear to auscultation bilaterally   Heart:  regular rate and rhythm, S1, S2 normal, no murmur, click, rub or gallop  normal apical impulse   Breasts:  Examined in supine position  Symmetric without masses or skin dimpling  Nipples normal without inversion, lesions or discharge  There are no palpable axillary nodes  Fibrocystic changes are present both breasts without a discrete mass   Abdomen: soft, non-tender; no masses  no umbilical or inguinal hernias are present  no hepato-splenomegaly   Pelvis: Clinical staff was present for exam  External genitalia:  normal appearance of the external genitalia including Bartholin's and Ebony's glands.  :  urethral meatus normal;  Vaginal:  normal pink mucosa without prolapse or lesions.  Cervix:  normal appearance. Anterior and difficult to visualize.   Uterus:  not palpable.  Adnexa:  non palpable bilaterally.  Rectal:  digital rectal exam not performed; anus visually normal appearing.     Bimanual exam limited d/t body habitus. Generalized pelvic tenderness on exam.     Assessment / Plan      Assessment  Problems Addressed This Visit    ICD-10-CM ICD-9-CM   1. Encounter for gynecological examination with abnormal finding  Z01.411 V72.31   2. ASCUS with positive high risk HPV cervical  R87.610 795.01    R87.810 795.05   3. Abnormal mammogram  R92.8 793.80   4. Pelvic pain  R10.2 [id number]   5. History of ovarian cyst  Z87.42 V13.29   6. Uterine leiomyoma, unspecified location  D25.9 218.9       Plan    Reviewed pap smear results - pap smear pending   Discussed monthly SBEs and stressed importance of annual and f/u breast imaging   She will call to  reschedule right breast biopsy - stressed importance of this - pt v/u     Order pelvic u/s for f/u RLQ pain   Will call to discuss results   Then will discuss scheduling surgical consult to discuss hysterectomy     AV 1 yr         40 to 64: Counseling/Anticipatory Guidance Discussed: screenings and self-breast exam    Follow Up  Return in about 1 year (around 10/12/2023) for Annual physical.  Patient was given instructions and counseling regarding her condition or for health maintenance advice. Please see specific information pulled into the AVS if appropriate.     Sarah Valentin, APRN  October 12, 2022  15:57 EDT

## 2022-10-13 LAB — REF LAB TEST METHOD: NORMAL

## 2022-10-15 DIAGNOSIS — R06.2 WHEEZING: ICD-10-CM

## 2022-10-17 RX ORDER — ALBUTEROL SULFATE 2.5 MG/3ML
2.5 SOLUTION RESPIRATORY (INHALATION) EVERY 4 HOURS PRN
Qty: 3 ML | Refills: 5 | Status: SHIPPED | OUTPATIENT
Start: 2022-10-17

## 2022-10-17 RX ORDER — ALBUTEROL SULFATE 90 UG/1
2 AEROSOL, METERED RESPIRATORY (INHALATION) EVERY 4 HOURS PRN
Qty: 18 G | Refills: 3 | Status: CANCELLED | OUTPATIENT
Start: 2022-10-17

## 2022-10-17 NOTE — TELEPHONE ENCOUNTER
Rx Refill Note  Requested Prescriptions     Pending Prescriptions Disp Refills   • albuterol (PROVENTIL) (2.5 MG/3ML) 0.083% nebulizer solution        Last office visit with prescribing clinician: 5/17/2022      Next office visit with prescribing clinician: 11/21/2022            Emilie Lew MA  10/17/22, 10:49 EDT

## 2022-10-19 ENCOUNTER — OFFICE VISIT (OUTPATIENT)
Dept: SLEEP MEDICINE | Facility: HOSPITAL | Age: 42
End: 2022-10-19

## 2022-10-19 VITALS
HEIGHT: 69 IN | DIASTOLIC BLOOD PRESSURE: 67 MMHG | WEIGHT: 293 LBS | OXYGEN SATURATION: 97 % | SYSTOLIC BLOOD PRESSURE: 140 MMHG | BODY MASS INDEX: 43.4 KG/M2 | HEART RATE: 87 BPM

## 2022-10-19 DIAGNOSIS — R29.818 SUSPECTED SLEEP APNEA: ICD-10-CM

## 2022-10-19 DIAGNOSIS — R06.83 SNORING: Primary | ICD-10-CM

## 2022-10-19 DIAGNOSIS — G47.19 EXCESSIVE DAYTIME SLEEPINESS: ICD-10-CM

## 2022-10-19 PROCEDURE — 99214 OFFICE O/P EST MOD 30 MIN: CPT | Performed by: NURSE PRACTITIONER

## 2022-10-19 NOTE — PROGRESS NOTES
Chief Complaint  Sleeping Problem    Subjective     History of Present Illness:  Starr Ruiz is a 42 y.o. female with a history of HTN, and COVID-19.  She reports a history of asthma and has a BMI of 55.38.  Patient reports snoring, daytime sleepiness fatigue, frequent awakening, heartburn and reflux, leg and body jerks, frequent nighttime urination, difficulty falling asleep, difficulty staying asleep, pain at night, sleep talking, nightmares, decreased libido.  She reports he is frequently exhausted.  She typically goes to bed 10-10:30 PM waking at 6-6:30 AM on week days and 12 AM-2 AM waking at 630-9 AM on weekends.  She gets approximately 6-8 hours of sleep per night on average.  Takes approximately 1 or more hours to get to sleep initially.  She does not take naps.  Patient does smoke cigarettes starting in 1996, she denies use of alcohol, and use marijuana in the past stopping in 2015.  Patient drinks coffee regular 4 cups a day, tea regular 4 cups a day.  She notes that she believes her provider is concerned about COPD as well.    Further details are as follows:    [place] Scale is (out of 24): Total score: 9     Estimated average amount of sleep per night: 6-8  Number of times she wakes up at night: 1-5  Difficulty falling back asleep: yes  It usually takes 60 minutes to go to sleep.  She feels sleepy upon waking up: yes  Rotating or night shift work: no    Drowsiness/Sleepiness:  She exhibits the following:  excessive daytime sleepiness  excessive daytime fatigue  falls asleep watching TV    Snoring/Breathing:  She exhibits the following:  loud snoring, snores in all sleep positions, awakens with dry mouth and morning headaches    Head Injury:  She exhibits the following:  No    Reflux:  She describes the following:  wakes up at night with a sour taste or burning sensation in chest    Narcolepsy:  She exhibits the following:  none    RLS/PLMs:  She describes the following:  moves or jerks  during sleep  discomfort in legs with an urge to move them    Insomnia:  She describes the following:  problems initiating sleep at night  frequent awakenings  bothered by pain at night  restless sleep    Parasomnia:  She exhibits the following:  sleep talks  frequent nightmares    Weight:       10/19/22  1404   Weight: (!) 171 kg (376 lb 6.4 oz)      Weight change in the last year:  loss: 0 lbs    The patient's relevant past medical, surgical, family, and social history reviewed and updated in Epic as appropriate.    Review of Systems   Constitutional: Positive for fatigue.   HENT: Positive for trouble swallowing.    Respiratory: Positive for cough and wheezing.    Gastrointestinal: Positive for abdominal pain, anal bleeding, blood in stool and constipation.   Genitourinary: Positive for frequency.   Musculoskeletal: Positive for back pain, myalgias and neck pain.   Psychiatric/Behavioral: The patient is nervous/anxious.    All other systems reviewed and are negative.      PMH:    Past Medical History:   Diagnosis Date   • COVID     2022, 2022   • Depression with anxiety    • Essential hypertension    • H/O trichomoniasis    • History of chlamydia    • RLS (restless legs syndrome)    • Uterine fibroid      Past Surgical History:   Procedure Laterality Date   • CHOLECYSTECTOMY N/A 2022    Procedure: CHOLECYSTECTOMY LAPAROSCOPIC;  Surgeon: Tomas Hamm MD;  Location: [facility];  Service: General;  Laterality: N/A;   • D & C HYSTEROSCOPY WITH NOVASURE ENDOMETRIAL ABLATION AND MYOSURE  2017    SJE   • LAPAROSCOPIC TUBAL LIGATION      Dr. Puente   • LEEP     • RECTAL FISSURE INCISION AND DRAINAGE  2019   • WISDOM TOOTH EXTRACTION       OB History        6    Para   4    Term   3       1    AB   2    Living   4       SAB   2    IAB        Ectopic        Molar        Multiple        Live Births   4          Obstetric Comments    - [place]   -  "[place]  2003 - [place]  2004 - [facility]           Allergies   Allergen Reactions   • Lisinopril Cough       MEDS:  Prior to Admission medications    Medication Sig Start Date End Date Taking? Authorizing Provider   albuterol (PROVENTIL) (2.5 MG/3ML) 0.083% nebulizer solution Take 2.5 mg by nebulization Every 4 (Four) Hours As Needed for Wheezing. 10/17/22   Nahed Molina MD   docusate sodium (Colace) 100 MG capsule Take 1 capsule by mouth 2 (Two) Times a Day As Needed for Constipation. 9/1/22 9/1/23  Tomas Hamm MD   escitalopram (LEXAPRO) 10 MG tablet Take 1 tablet by mouth Daily. 5/17/22   Nahed Molina MD   hydroCHLOROthiazide (HYDRODIURIL) 12.5 MG tablet Take 1 tablet by mouth Daily. 5/17/22   Nahed Molina MD   losartan (COZAAR) 100 MG tablet Take 1 tablet by mouth Daily. 5/17/22   Nahed Molina MD   traZODone (DESYREL) 50 MG tablet Take 3 tablets by mouth Every Night. 10/5/22   Nahed Molina MD       FH:  Family History   Problem Relation Age of Onset   • No Known Problems Mother    • Sleep disorder Father    • Emphysema Father    • COPD Father    • Hypertension Father    • Breast cancer Other         age unknown    • Endometrial cancer Neg Hx    • Ovarian cancer Neg Hx        Objective   Vital Signs:  /67   Pulse 87   Ht 175.3 cm (69\")   Wt (!) 171 kg (376 lb 6.4 oz)   SpO2 97%   BMI 55.58 kg/m²     Class 3 Severe Obesity (BMI >=40). Obesity-related health conditions include the following: obstructive sleep apnea, hypertension, coronary heart disease and diabetes mellitus. Obesity is unchanged. BMI is is above average; BMI management plan is completed. We discussed portion control and increasing exercise.    Starr Carlos Sara  reports that she has been smoking cigarettes. She started smoking about 27 years ago. She has a 25.00 pack-year smoking history. She has never used smokeless tobacco.. I have educated her on the risk of diseases from using tobacco products such " as COPD.     I advised her to quit and she is not willing to quit.    I spent 3  minutes counseling the patient.           Physical Exam  Vitals reviewed.   Constitutional:       Appearance: Normal appearance.   HENT:      Head: Normocephalic and atraumatic.      Nose: Nose normal.      Mouth/Throat:      Mouth: Mucous membranes are moist.   Cardiovascular:      Rate and Rhythm: Normal rate and regular rhythm.      Heart sounds: No murmur heard.    No friction rub. No gallop.   Pulmonary:      Effort: Pulmonary effort is normal. No respiratory distress.      Breath sounds: Normal breath sounds. No wheezing or rhonchi.   Neurological:      Mental Status: She is alert and oriented to person, place, and time.   Psychiatric:         Behavior: Behavior normal.       Mallampati Score: III (soft and hard palate and base of uvula visible)    Result Review :              Assessment and Plan  This is a very pleasant 42-year-old female who presents for evaluation of daytime sleepiness fatigue, nonrestorative sleep, and concerns for sleep disordered breathing and obstructive sleep apnea.  Additionally, she reports concern on the part of her provider for COPD.  I have suggested that she be evaluated by pulmonology and undergo PFTs.  We will obtain home sleep test for further evaluation.  The patient return for follow-up after test for recommendations.  I have discussed tobacco abuse, and tobacco cessation.  Additionally I have discussed weight loss as it contributes to obstructive sleep apnea.  The patient notes that she lost approximately 40 pounds but gained it back and so she discontinued this.  I have encouraged continued efforts to both discontinue tobacco use, and continue to work on weight loss.    Diagnoses and all orders for this visit:    1. Snoring (Primary)  -     Home Sleep Study; Future    2. Suspected sleep apnea  -     Home Sleep Study; Future    3. Excessive daytime sleepiness  -     Home Sleep Study;  Future    4. BMI 50.0-59.9, adult (HCC)                 I discussed the consequences of uncontrolled sleep apnea including hypertension, heart disease, diabetes, stroke, and dementia. I further discussed sleep apnea therapeutic options including CPAP, Weight loss, Oral dental appliance, and surgery.    Follow Up  Return for Follow up after study.  Patient was given instructions and counseling regarding her condition or for health maintenance advice. Please see specific information pulled into the AVS if appropriate.     WILMA Vergara, ACNP-BC  Pulmonary, Critical Care Medicine, and Sleep Medicine  Electronically signed by WILMA Coffey, 10/19/22, 8:08 AM EDT.

## 2023-06-12 DIAGNOSIS — I10 ESSENTIAL HYPERTENSION: Chronic | ICD-10-CM

## 2023-06-12 DIAGNOSIS — F41.9 ANXIETY: ICD-10-CM

## 2023-06-12 RX ORDER — HYDROCHLOROTHIAZIDE 12.5 MG/1
12.5 TABLET ORAL DAILY
Qty: 90 TABLET | Refills: 3 | Status: SHIPPED | OUTPATIENT
Start: 2023-06-12

## 2023-06-12 RX ORDER — ESCITALOPRAM OXALATE 10 MG/1
10 TABLET ORAL DAILY
Qty: 90 TABLET | Refills: 3 | Status: SHIPPED | OUTPATIENT
Start: 2023-06-12

## 2023-06-12 RX ORDER — LOSARTAN POTASSIUM 100 MG/1
100 TABLET ORAL DAILY
Qty: 90 TABLET | Refills: 3 | Status: SHIPPED | OUTPATIENT
Start: 2023-06-12

## 2023-06-12 NOTE — TELEPHONE ENCOUNTER
Rx Refill Note  Requested Prescriptions     Pending Prescriptions Disp Refills    escitalopram (LEXAPRO) 10 MG tablet 90 tablet 3     Sig: Take 1 tablet by mouth Daily.    hydroCHLOROthiazide (HYDRODIURIL) 12.5 MG tablet 90 tablet 3     Sig: Take 1 tablet by mouth Daily.    losartan (COZAAR) 100 MG tablet 90 tablet 3     Sig: Take 1 tablet by mouth Daily.      Last office visit with prescribing clinician: 5/17/2022   Last telemedicine visit with prescribing clinician: Visit date not found   Next office visit with prescribing clinician: 11/20/2023                         Would you like a call back once the refill request has been completed: [] Yes [] No    If the office needs to give you a call back, can they leave a voicemail: [] Yes [] No    Jessica Dennis MA  06/12/23, 11:25 EDT

## 2023-06-12 NOTE — TELEPHONE ENCOUNTER
Rx Refill Note  Requested Prescriptions     Pending Prescriptions Disp Refills   • escitalopram (LEXAPRO) 10 MG tablet 90 tablet 3     Sig: Take 1 tablet by mouth Daily.      Last office visit with prescribing clinician: 5/17/2022   Last telemedicine visit with prescribing clinician: Visit date not found   Next office visit with prescribing clinician: 11/20/2023                         Would you like a call back once the refill request has been completed: [] Yes [] No    If the office needs to give you a call back, can they leave a voicemail: [] Yes [] No    Laurel Márquez LPN  06/12/23, 10:12 EDT

## 2023-11-02 DIAGNOSIS — G47.00 INSOMNIA, UNSPECIFIED TYPE: ICD-10-CM

## 2023-11-02 DIAGNOSIS — F41.9 ANXIETY: ICD-10-CM

## 2023-11-02 DIAGNOSIS — I10 ESSENTIAL HYPERTENSION: Chronic | ICD-10-CM

## 2023-11-02 NOTE — TELEPHONE ENCOUNTER
Caller: Starr Ruiz Breanna    Relationship: Self    Best call back number: [phone]     Requested Prescriptions:   Requested Prescriptions     Pending Prescriptions Disp Refills    escitalopram (LEXAPRO) 10 MG tablet 90 tablet 3     Sig: Take 1 tablet by mouth Daily.    albuterol (PROVENTIL) (2.5 MG/3ML) 0.083% nebulizer solution 3 mL 5     Sig: Take 2.5 mg by nebulization Every 4 (Four) Hours As Needed for Wheezing.    hydroCHLOROthiazide (HYDRODIURIL) 12.5 MG tablet 90 tablet 3     Sig: Take 1 tablet by mouth Daily.    losartan (COZAAR) 100 MG tablet 90 tablet 3     Sig: Take 1 tablet by mouth Daily.    traZODone (DESYREL) 50 MG tablet 90 tablet 3     Sig: Take 3 tablets by mouth Every Night.    PATIENT SWITCHING PHARMACY TO [facility] where request should be sent: [facility] [address] [place] [address] [phone] [facility] [phone] FX     Last office visit with prescribing clinician: 5/17/2022   Last telemedicine visit with prescribing clinician: Visit date not found   Next office visit with prescribing clinician: 11/20/2023     Additional details provided by patient: PATIENT IS OUT OF MEDICATIONS    Does the patient have less than a 3 day supply:  [x] Yes  [] No    Would you like a call back once the refill request has been completed: [] Yes [x] No    If the office needs to give you a call back, can they leave a voicemail: [] Yes [x] No    Arleen Yadav Rep   11/02/23 10:50 EDT

## 2023-11-06 RX ORDER — HYDROCHLOROTHIAZIDE 12.5 MG/1
12.5 TABLET ORAL DAILY
Qty: 90 TABLET | Refills: 3 | Status: SHIPPED | OUTPATIENT
Start: 2023-11-06

## 2023-11-06 RX ORDER — TRAZODONE HYDROCHLORIDE 50 MG/1
150 TABLET ORAL NIGHTLY
Qty: 90 TABLET | Refills: 3 | Status: SHIPPED | OUTPATIENT
Start: 2023-11-06

## 2023-11-06 RX ORDER — LOSARTAN POTASSIUM 100 MG/1
100 TABLET ORAL DAILY
Qty: 90 TABLET | Refills: 3 | Status: SHIPPED | OUTPATIENT
Start: 2023-11-06

## 2023-11-06 RX ORDER — ALBUTEROL SULFATE 2.5 MG/3ML
2.5 SOLUTION RESPIRATORY (INHALATION) EVERY 4 HOURS PRN
Qty: 3 ML | Refills: 5 | Status: SHIPPED | OUTPATIENT
Start: 2023-11-06

## 2023-11-06 RX ORDER — ESCITALOPRAM OXALATE 10 MG/1
10 TABLET ORAL DAILY
Qty: 90 TABLET | Refills: 3 | Status: SHIPPED | OUTPATIENT
Start: 2023-11-06

## 2024-02-21 ENCOUNTER — LAB (OUTPATIENT)
Dept: LAB | Facility: HOSPITAL | Age: 44
End: 2024-02-21
Payer: COMMERCIAL

## 2024-02-21 ENCOUNTER — OFFICE VISIT (OUTPATIENT)
Dept: FAMILY MEDICINE CLINIC | Facility: CLINIC | Age: 44
End: 2024-02-21
Payer: COMMERCIAL

## 2024-02-21 VITALS
DIASTOLIC BLOOD PRESSURE: 86 MMHG | TEMPERATURE: 98.4 F | WEIGHT: 293 LBS | RESPIRATION RATE: 21 BRPM | BODY MASS INDEX: 43.4 KG/M2 | HEART RATE: 80 BPM | HEIGHT: 69 IN | OXYGEN SATURATION: 98 % | SYSTOLIC BLOOD PRESSURE: 138 MMHG

## 2024-02-21 DIAGNOSIS — L72.3 SEBACEOUS CYST: ICD-10-CM

## 2024-02-21 DIAGNOSIS — F41.9 ANXIETY: ICD-10-CM

## 2024-02-21 DIAGNOSIS — N83.202 BILATERAL OVARIAN CYSTS: ICD-10-CM

## 2024-02-21 DIAGNOSIS — Z12.31 BREAST CANCER SCREENING BY MAMMOGRAM: ICD-10-CM

## 2024-02-21 DIAGNOSIS — Z00.00 ANNUAL PHYSICAL EXAM: Primary | ICD-10-CM

## 2024-02-21 DIAGNOSIS — E66.01 MORBID OBESITY DUE TO EXCESS CALORIES: ICD-10-CM

## 2024-02-21 DIAGNOSIS — L73.2 HYDRADENITIS: ICD-10-CM

## 2024-02-21 DIAGNOSIS — N83.201 BILATERAL OVARIAN CYSTS: ICD-10-CM

## 2024-02-21 DIAGNOSIS — Z20.2 EXPOSURE TO SEXUALLY TRANSMITTED DISEASE (STD): ICD-10-CM

## 2024-02-21 PROCEDURE — 87591 N.GONORRHOEAE DNA AMP PROB: CPT | Performed by: FAMILY MEDICINE

## 2024-02-21 PROCEDURE — 87661 TRICHOMONAS VAGINALIS AMPLIF: CPT | Performed by: FAMILY MEDICINE

## 2024-02-21 PROCEDURE — 87491 CHLMYD TRACH DNA AMP PROBE: CPT | Performed by: FAMILY MEDICINE

## 2024-02-21 RX ORDER — AZITHROMYCIN 250 MG/1
TABLET, FILM COATED ORAL SEE ADMIN INSTRUCTIONS
COMMUNITY
Start: 2023-11-04 | End: 2024-02-21

## 2024-02-21 RX ORDER — PREDNISONE 20 MG/1
3 TABLET ORAL DAILY
COMMUNITY
Start: 2023-11-04 | End: 2024-02-21

## 2024-02-21 RX ORDER — ESCITALOPRAM OXALATE 20 MG/1
20 TABLET ORAL DAILY
Qty: 90 TABLET | Refills: 3 | Status: SHIPPED | OUTPATIENT
Start: 2024-02-21

## 2024-02-21 RX ORDER — CLINDAMYCIN PHOSPHATE 11.9 MG/ML
SOLUTION TOPICAL 2 TIMES DAILY
Qty: 60 ML | Refills: 1 | Status: SHIPPED | OUTPATIENT
Start: 2024-02-21

## 2024-02-21 NOTE — PROGRESS NOTES
Subjective   Starr Ruiz is a 43 y.o. female and is here for a comprehensive physical exam. The patient reports  anxiety and depression . Patient reports last physical date of over 1 year  HPI  She reports being more tearful and having more stress and anxiety no suicidal or homicidal ideation.    She also reports that she has cysts under her arms and in her skin creases that come and go she reports that she has a cyst on the inside of her leg that comes and goes and that she has 1 on the left side of her bellybutton that seems to come and go for the last 6 months.    Do you take any herbs or supplements that were not prescribed by a doctor? no  Are you taking calcium supplements? no  Are you taking aspirin daily? no  Family history of ovarian cancer? no  Family history of breast cancer?  Great aunt  FH of endometrial cancer? no  FH of cervical cancer? no  FH of colon cancer? no    Cancer Screening  Mammogram up-to-date?  yes  If yes, last exam date: she had one and found a cluster and she did not go back.    BMD up-to-date? no  If yes, last exam date: na  Colonoscopy up-to-date? no   If yes, last exam date: not yet  Pap up-to-date? no   If yes, last exam date: refer gyn     History:  LMP: Patient's last menstrual period was 2023 (approximate).  Menopause at 6 months years  Last pap date:   Abnormal pap? yes  : 6  Para: 4    Immunization History  Tdap?  2017  HPV? not applicable  Pneumonia?  declines  Shingles? not applicable    The following portions of the patient's history were reviewed and updated as appropriate: allergies, current medications, past family history, past medical history, past social history, past surgical history, and problem list.    Past Medical History:   Diagnosis Date    COVID     2022, 2022    Depression with anxiety     Essential hypertension     H/O trichomoniasis     History of chlamydia 2021    Obesity     RLS (restless legs  syndrome)     Uterine fibroid 2016       Family History   Problem Relation Age of Onset    No Known Problems Mother     Sleep disorder Father     Emphysema Father     COPD Father     Hypertension Father     Breast cancer Other         age unknown     Endometrial cancer Neg Hx     Ovarian cancer Neg Hx        Past Surgical History:   Procedure Laterality Date    CHOLECYSTECTOMY N/A 09/01/2022    Procedure: CHOLECYSTECTOMY LAPAROSCOPIC;  Surgeon: Tomas Hamm MD;  Location: [facility];  Service: General;  Laterality: N/A;    D & C HYSTEROSCOPY WITH NOVASURE ENDOMETRIAL ABLATION AND MYOSURE  2017    SJE    ENDOMETRIAL ABLATION  2017    LAPAROSCOPIC TUBAL LIGATION  2004    Dr. Cindi SPIVEY  1998    RECTAL FISSURE INCISION AND DRAINAGE  2019    TUBAL ABDOMINAL LIGATION  2004    WISDOM TOOTH EXTRACTION  1996       Social History     Socioeconomic History    Marital status: Single   Tobacco Use    Smoking status: Every Day     Packs/day: 1.00     Years: 25.00     Additional pack years: 0.00     Total pack years: 25.00     Types: Cigarettes     Start date: 1995    Smokeless tobacco: Never   Vaping Use    Vaping Use: Never used   Substance and Sexual Activity    Alcohol use: Not Currently     Comment: rare    Drug use: Yes     Types: Marijuana     Comment: Last used ~ 2008    Sexual activity: Yes     Partners: Male     Birth control/protection: Surgical, None       Review of Systems  Do you have pain that bothers you in your daily life? no  Review of Systems   Constitutional: Negative.    HENT: Negative.     Eyes: Negative.    Respiratory: Negative.     Cardiovascular: Negative.    Gastrointestinal: Negative.    Endocrine: Negative.    Genitourinary: Negative.    Musculoskeletal: Negative.    Skin:  Positive for rash.   Allergic/Immunologic: Negative.    Neurological: Negative.    Hematological: Negative.    Psychiatric/Behavioral:  Positive for agitation and sleep disturbance. Negative for suicidal ideas. The  patient is nervous/anxious.    All other systems reviewed and are negative.      Objective   Physical Exam  Vitals and nursing note reviewed.   Constitutional:       Appearance: She is well-developed. She is obese.   HENT:      Head: Normocephalic and atraumatic.   Eyes:      General:         Right eye: No discharge.         Left eye: No discharge.      Pupils: Pupils are equal, round, and reactive to light.   Cardiovascular:      Rate and Rhythm: Normal rate and regular rhythm.      Heart sounds: Normal heart sounds.   Pulmonary:      Effort: Pulmonary effort is normal.      Breath sounds: Normal breath sounds.   Abdominal:      General: Bowel sounds are normal.      Palpations: Abdomen is soft. There is no mass.      Tenderness: There is no abdominal tenderness.   Musculoskeletal:         General: Normal range of motion.      Right shoulder: No swelling.      Cervical back: Normal range of motion and neck supple.   Skin:     General: Skin is warm and dry.      Nails: There is no clubbing.   Neurological:      Mental Status: She is alert and oriented to person, place, and time.      Deep Tendon Reflexes: Reflexes are normal and symmetric.   Psychiatric:         Behavior: Behavior normal.         Thought Content: Thought content normal.         Judgment: Judgment normal.          Assessment & Plan   Healthy female exam.      1.   Problem List Items Addressed This Visit          Genitourinary and Reproductive     Bilateral ovarian cysts    Relevant Orders    Ambulatory Referral to Obstetrics / Gynecology       Health Encounters    Annual physical exam - Primary    Overview     SCREENING TESTS    Year 2017 2018 2019 2020 2021 2022 2023 2024 2025 2026 2027 2028 2029 2030 2031 2032 2033 2034 2035 2036   Age       42                PAP      10                 HPV high risk      10                 KODY [Birads]                       SABRINA (5 year)  [place] (lifetime)                       Colonoscopy                     "   DEXA [T-score]  Frax [hip/any]                       Lipids  [LDL / HDL / TG]                       Vitamin D                       TSH                         Enter the month test was performed.  If month not known, enter \"X'  Black numbers = normal results  Red numbers = abnormal results  Black X = patient reported normal  Red X - patient reported abnormal      Referred by: Sarah Valentin   Profession: Collection with [employer]   Other info:             Relevant Orders    CBC & Differential    TSH    Comprehensive Metabolic Panel    Hemoglobin A1c    Lipid Panel    Vitamin D,25-Hydroxy    Vitamin B12    HIV-1 / O / 2 Ag / Antibody    Hepatitis Panel, Acute    RPR    Urinalysis With Culture If Indicated - Urine, Clean Catch       Mental Health    Anxiety    Relevant Medications    escitalopram (LEXAPRO) 20 MG tablet     Other Visit Diagnoses       Sebaceous cyst        Relevant Orders    Ambulatory Referral to General Surgery    Hydradenitis        Relevant Orders    Ambulatory Referral to Dermatology    Morbid obesity due to excess calories        Relevant Orders    Ambulatory Referral to Weight Management Program              2. Patient Counseling:  --Nutrition: Stressed importance of moderation in sodium/caffeine intake, saturated fat and cholesterol, caloric balance, sufficient intake of fresh fruits, vegetables, fiber, calcium, iron, and 1 mg of folate supplement per day (for females capable of pregnancy).  --Discussed the issue of estrogen replacement, calcium supplement, and the daily use of baby aspirin.  --Exercise: Stressed the importance of regular exercise.   --Substance Abuse: Discussed cessation/primary prevention of tobacco, alcohol, or other drug use; driving or other dangerous activities under the influence; availability of treatment for abuse.    --Sexuality: Discussed sexually transmitted diseases, partner selection, use of condoms, avoidance of unintended pregnancy  and contraceptive " alternatives.   --Injury prevention: Discussed safety belts, safety helmets, smoke detector, smoking near bedding or upholstery.   --Dental health: Discussed importance of regular tooth brushing, flossing, and dental visits.  --Immunizations reviewed.  --Discussed benefits of screening colonoscopy.  --After hours service discussed with patient    3. Discussed the patient's BMI with her.  The BMI is above average; BMI management plan is completed  4. Follow up in 3 months

## 2024-02-26 LAB
C TRACH RRNA SPEC QL NAA+PROBE: NEGATIVE
N GONORRHOEA RRNA SPEC QL NAA+PROBE: NEGATIVE
T VAGINALIS RRNA SPEC QL NAA+PROBE: NEGATIVE

## 2024-03-13 DIAGNOSIS — G47.00 INSOMNIA, UNSPECIFIED TYPE: ICD-10-CM

## 2024-03-13 RX ORDER — TRAZODONE HYDROCHLORIDE 50 MG/1
TABLET ORAL
Qty: 90 TABLET | Refills: 0 | Status: SHIPPED | OUTPATIENT
Start: 2024-03-13

## 2024-03-25 ENCOUNTER — TELEMEDICINE (OUTPATIENT)
Dept: FAMILY MEDICINE CLINIC | Facility: TELEHEALTH | Age: 44
End: 2024-03-25
Payer: COMMERCIAL

## 2024-03-25 DIAGNOSIS — H00.014 HORDEOLUM EXTERNUM OF LEFT UPPER EYELID: Primary | ICD-10-CM

## 2024-03-25 PROCEDURE — 99213 OFFICE O/P EST LOW 20 MIN: CPT | Performed by: NURSE PRACTITIONER

## 2024-03-25 RX ORDER — ERYTHROMYCIN 5 MG/G
OINTMENT OPHTHALMIC EVERY 6 HOURS
Qty: 3.5 G | Refills: 0 | Status: SHIPPED | OUTPATIENT
Start: 2024-03-25 | End: 2024-04-01

## 2024-03-25 NOTE — PROGRESS NOTES
CHIEF COMPLAINT  Chief Complaint   Patient presents with    Stye         HPI  Starr Ruiz is a 43 y.o. female  presents with complaint of left upper lid stye for which she has been trying warm compresses.     Review of Systems   Eyes:  Positive for pain. Negative for discharge, redness and itching.       Past Medical History:   Diagnosis Date    COVID     01/2022, 08/2022    Depression with anxiety 1998    Essential hypertension 1998    H/O trichomoniasis 2022    History of chlamydia 2021 2022    Obesity     RLS (restless legs syndrome)     Uterine fibroid 2016       Family History   Problem Relation Age of Onset    No Known Problems Mother     Sleep disorder Father     Emphysema Father     COPD Father     Hypertension Father     Breast cancer Other         age unknown     Endometrial cancer Neg Hx     Ovarian cancer Neg Hx        Social History     Socioeconomic History    Marital status: Single   Tobacco Use    Smoking status: Every Day     Current packs/day: 1.00     Average packs/day: 1 pack/day for 29.2 years (29.2 ttl pk-yrs)     Types: Cigarettes     Start date: 1995     Passive exposure: Never    Smokeless tobacco: Never    Tobacco comments:     Quitting. Has not had a cig since February 26, 2024.    Vaping Use    Vaping status: Every Day    Substances: Nicotine, Flavoring   Substance and Sexual Activity    Alcohol use: Not Currently     Comment: rare    Drug use: Yes     Types: Marijuana     Comment: Last used ~ 2008    Sexual activity: Yes     Partners: Male     Birth control/protection: Surgical, None              There were no vitals taken for this visit.    PHYSICAL EXAM  Physical Exam   Constitutional: She is oriented to person, place, and time. She appears well-developed and well-nourished. She does not have a sickly appearance. She does not appear ill. No distress.   HENT:   Head: Normocephalic and atraumatic.   Eyes: Conjunctivae and EOM are normal. Right eye exhibits no discharge,  no exudate, no hordeolum and no edema. No foreign body present in the right eye. Left eye exhibits hordeolum. Left eye exhibits no discharge, no exudate and no edema. No foreign body present in the left eye. Eyelid: no right ptosis or left ptosis. No scleral icterus.       Neck: Neck normal appearance.  Pulmonary/Chest: Effort normal.  No respiratory distress.  Neurological: She is alert and oriented to person, place, and time.   Skin: Skin is dry.   Psychiatric: She has a normal mood and affect.         Diagnoses and all orders for this visit:    1. Hordeolum externum of left upper eyelid (Primary)    Other orders  -     erythromycin (ROMYCIN) 5 MG/GM ophthalmic ointment; Administer  into the left eye Every 6 (Six) Hours for 7 days.  Dispense: 3.5 g; Refill: 0        The use of a video visit has been reviewed with the patient and verbal informed consent has been obtained. Myself and Starr Ruiz participated in this visit. The patient is located in [address] [place] [address]. I am located in [place], [place]. [employer] and [employer] were utilized.       Note Disclaimer: At [facility], we believe that sharing information builds trust and better   relationships. You are receiving this note because you recently visited [facility]. It is possible you   will see health information before a provider has talked with you about it. This kind of information can   be easy to misunderstand. To help you fully understand what it means for your health, we urge you to   discuss this note with your provider.    WILMA Bates  03/25/2024  12:14 EDT

## 2024-04-11 DIAGNOSIS — G47.00 INSOMNIA, UNSPECIFIED TYPE: ICD-10-CM

## 2024-04-11 RX ORDER — TRAZODONE HYDROCHLORIDE 50 MG/1
TABLET ORAL
Qty: 90 TABLET | Refills: 0 | Status: SHIPPED | OUTPATIENT
Start: 2024-04-11

## 2024-05-09 ENCOUNTER — DOCUMENTATION (OUTPATIENT)
Dept: FAMILY MEDICINE CLINIC | Facility: CLINIC | Age: 44
End: 2024-05-09
Payer: COMMERCIAL

## 2024-05-09 DIAGNOSIS — R92.8 ABNORMAL SCREENING MAMMOGRAM: Primary | ICD-10-CM

## 2024-05-11 DIAGNOSIS — L72.3 SEBACEOUS CYST: ICD-10-CM

## 2024-05-13 RX ORDER — CLINDAMYCIN PHOSPHATE 11.9 MG/ML
SOLUTION TOPICAL SEE ADMIN INSTRUCTIONS
Qty: 60 ML | Refills: 0 | Status: SHIPPED | OUTPATIENT
Start: 2024-05-13

## 2024-05-13 NOTE — TELEPHONE ENCOUNTER
Rx Refill Note  Requested Prescriptions     Pending Prescriptions Disp Refills    clindamycin (CLEOCIN T) 1 % external solution [Pharmacy Med Name: Clindamycin Phosphate 1 % External Solution] 60 mL 0     Sig: APPLY TOPICALLY TO THE APPROPRIATE AREA AS DIRECTED TWICE DAILY      Last office visit with prescribing clinician: 2/21/2024   Last telemedicine visit with prescribing clinician: Visit date not found   Next office visit with prescribing clinician: 5/22/2024                         Would you like a call back once the refill request has been completed: [] Yes [] No    If the office needs to give you a call back, can they leave a voicemail: [] Yes [] No    Florence Márquez MA  05/13/24, 12:02 EDT

## 2024-05-22 DIAGNOSIS — G47.00 INSOMNIA, UNSPECIFIED TYPE: ICD-10-CM

## 2024-05-30 ENCOUNTER — TELEPHONE (OUTPATIENT)
Dept: FAMILY MEDICINE CLINIC | Facility: CLINIC | Age: 44
End: 2024-05-30
Payer: COMMERCIAL

## 2024-05-30 RX ORDER — TRAZODONE HYDROCHLORIDE 50 MG/1
TABLET ORAL
Qty: 90 TABLET | Refills: 0 | Status: SHIPPED | OUTPATIENT
Start: 2024-05-30

## 2024-05-30 NOTE — TELEPHONE ENCOUNTER
ON CALL:    Rx Refill Note  Requested Prescriptions     Pending Prescriptions Disp Refills    traZODone (DESYREL) 50 MG tablet [Pharmacy Med Name: traZODone HCl 50 MG Oral Tablet] 90 tablet 0     Sig: TAKE 3 TABLETS BY MOUTH ONCE DAILY AT NIGHT      Last office visit with prescribing clinician: 2/21/2024   Last telemedicine visit with prescribing clinician: Visit date not found   Next office visit with prescribing clinician: 5/22/2024

## 2024-05-30 NOTE — TELEPHONE ENCOUNTER
Natasha has sent a request fro a medication that's not listed on the patients chart.    TRAODONE 50 mg Take 3 Tablets by mouth once daily at night.  QTY: 90

## 2024-07-02 DIAGNOSIS — G47.00 INSOMNIA, UNSPECIFIED TYPE: ICD-10-CM

## 2024-07-02 RX ORDER — TRAZODONE HYDROCHLORIDE 50 MG/1
TABLET ORAL
Qty: 90 TABLET | Refills: 0 | Status: SHIPPED | OUTPATIENT
Start: 2024-07-02

## 2024-07-11 ENCOUNTER — PATIENT MESSAGE (OUTPATIENT)
Dept: FAMILY MEDICINE CLINIC | Facility: CLINIC | Age: 44
End: 2024-07-11
Payer: COMMERCIAL

## 2024-07-30 DIAGNOSIS — G47.00 INSOMNIA, UNSPECIFIED TYPE: ICD-10-CM

## 2024-07-30 RX ORDER — TRAZODONE HYDROCHLORIDE 50 MG/1
TABLET ORAL
Qty: 90 TABLET | Refills: 0 | Status: SHIPPED | OUTPATIENT
Start: 2024-07-30

## 2024-08-16 ENCOUNTER — TELEPHONE (OUTPATIENT)
Dept: FAMILY MEDICINE CLINIC | Facility: CLINIC | Age: 44
End: 2024-08-16
Payer: COMMERCIAL

## 2024-08-16 NOTE — TELEPHONE ENCOUNTER
Called and spoke with pt to reminder her of her pending Mammogram. I gave her the scheduling phone number .

## 2024-08-31 DIAGNOSIS — G47.00 INSOMNIA, UNSPECIFIED TYPE: ICD-10-CM

## 2024-08-31 RX ORDER — TRAZODONE HYDROCHLORIDE 50 MG/1
TABLET, FILM COATED ORAL
Qty: 90 TABLET | Refills: 0 | Status: SHIPPED | OUTPATIENT
Start: 2024-08-31

## 2024-09-25 DIAGNOSIS — G47.00 INSOMNIA, UNSPECIFIED TYPE: ICD-10-CM

## 2024-09-25 RX ORDER — TRAZODONE HYDROCHLORIDE 50 MG/1
TABLET, FILM COATED ORAL
Qty: 90 TABLET | Refills: 0 | Status: SHIPPED | OUTPATIENT
Start: 2024-09-25

## 2024-11-14 ENCOUNTER — LAB (OUTPATIENT)
Dept: LAB | Facility: HOSPITAL | Age: 44
End: 2024-11-14
Payer: COMMERCIAL

## 2024-11-14 DIAGNOSIS — Z00.00 ANNUAL PHYSICAL EXAM: ICD-10-CM

## 2024-11-14 LAB
25(OH)D3 SERPL-MCNC: 14.1 NG/ML (ref 30–100)
ALBUMIN SERPL-MCNC: 4.1 G/DL (ref 3.5–5.2)
ALBUMIN/GLOB SERPL: 1.5 G/DL
ALP SERPL-CCNC: 91 U/L (ref 39–117)
ALT SERPL W P-5'-P-CCNC: 19 U/L (ref 1–33)
ANION GAP SERPL CALCULATED.3IONS-SCNC: 13 MMOL/L (ref 5–15)
AST SERPL-CCNC: 13 U/L (ref 1–32)
BACTERIA UR QL AUTO: ABNORMAL /HPF
BASOPHILS # BLD AUTO: 0.06 10*3/MM3 (ref 0–0.2)
BASOPHILS NFR BLD AUTO: 0.8 % (ref 0–1.5)
BILIRUB SERPL-MCNC: 0.2 MG/DL (ref 0–1.2)
BILIRUB UR QL STRIP: NEGATIVE
BUN SERPL-MCNC: 10 MG/DL (ref 6–20)
BUN/CREAT SERPL: 12 (ref 7–25)
CALCIUM SPEC-SCNC: 9.5 MG/DL (ref 8.6–10.5)
CHLORIDE SERPL-SCNC: 103 MMOL/L (ref 98–107)
CHOLEST SERPL-MCNC: 213 MG/DL (ref 0–200)
CLARITY UR: ABNORMAL
CO2 SERPL-SCNC: 27 MMOL/L (ref 22–29)
COLOR UR: YELLOW
CREAT SERPL-MCNC: 0.83 MG/DL (ref 0.57–1)
DEPRECATED RDW RBC AUTO: 44.4 FL (ref 37–54)
EGFRCR SERPLBLD CKD-EPI 2021: 89.3 ML/MIN/1.73
EOSINOPHIL # BLD AUTO: 0.23 10*3/MM3 (ref 0–0.4)
EOSINOPHIL NFR BLD AUTO: 2.9 % (ref 0.3–6.2)
ERYTHROCYTE [DISTWIDTH] IN BLOOD BY AUTOMATED COUNT: 13 % (ref 12.3–15.4)
GLOBULIN UR ELPH-MCNC: 2.7 GM/DL
GLUCOSE SERPL-MCNC: 117 MG/DL (ref 65–99)
GLUCOSE UR STRIP-MCNC: NEGATIVE MG/DL
HAV IGM SERPL QL IA: NORMAL
HBA1C MFR BLD: 5.5 % (ref 4.8–5.6)
HBV CORE IGM SERPL QL IA: NORMAL
HBV SURFACE AG SERPL QL IA: NORMAL
HCT VFR BLD AUTO: 43 % (ref 34–46.6)
HCV AB SER QL: NORMAL
HDLC SERPL-MCNC: 36 MG/DL (ref 40–60)
HGB BLD-MCNC: 14.8 G/DL (ref 12–15.9)
HGB UR QL STRIP.AUTO: NEGATIVE
HOLD SPECIMEN: NORMAL
HYALINE CASTS UR QL AUTO: ABNORMAL /LPF
IMM GRANULOCYTES # BLD AUTO: 0.05 10*3/MM3 (ref 0–0.05)
IMM GRANULOCYTES NFR BLD AUTO: 0.6 % (ref 0–0.5)
KETONES UR QL STRIP: NEGATIVE
LDLC SERPL CALC-MCNC: 104 MG/DL (ref 0–100)
LDLC/HDLC SERPL: 2.53 {RATIO}
LEUKOCYTE ESTERASE UR QL STRIP.AUTO: ABNORMAL
LYMPHOCYTES # BLD AUTO: 1.46 10*3/MM3 (ref 0.7–3.1)
LYMPHOCYTES NFR BLD AUTO: 18.5 % (ref 19.6–45.3)
MCH RBC QN AUTO: 32.3 PG (ref 26.6–33)
MCHC RBC AUTO-ENTMCNC: 34.4 G/DL (ref 31.5–35.7)
MCV RBC AUTO: 93.9 FL (ref 79–97)
MONOCYTES # BLD AUTO: 0.42 10*3/MM3 (ref 0.1–0.9)
MONOCYTES NFR BLD AUTO: 5.3 % (ref 5–12)
NEUTROPHILS NFR BLD AUTO: 5.69 10*3/MM3 (ref 1.7–7)
NEUTROPHILS NFR BLD AUTO: 71.9 % (ref 42.7–76)
NITRITE UR QL STRIP: NEGATIVE
NRBC BLD AUTO-RTO: 0 /100 WBC (ref 0–0.2)
PH UR STRIP.AUTO: 6 [PH] (ref 5–8)
PLATELET # BLD AUTO: 262 10*3/MM3 (ref 140–450)
PMV BLD AUTO: 8.8 FL (ref 6–12)
POTASSIUM SERPL-SCNC: 4.5 MMOL/L (ref 3.5–5.2)
PROT SERPL-MCNC: 6.8 G/DL (ref 6–8.5)
PROT UR QL STRIP: ABNORMAL
RBC # BLD AUTO: 4.58 10*6/MM3 (ref 3.77–5.28)
RBC # UR STRIP: ABNORMAL /HPF
REF LAB TEST METHOD: ABNORMAL
SODIUM SERPL-SCNC: 143 MMOL/L (ref 136–145)
SP GR UR STRIP: 1.02 (ref 1–1.03)
SQUAMOUS #/AREA URNS HPF: ABNORMAL /HPF
TRIGL SERPL-MCNC: 429 MG/DL (ref 0–150)
TSH SERPL DL<=0.05 MIU/L-ACNC: 1.9 UIU/ML (ref 0.27–4.2)
UROBILINOGEN UR QL STRIP: ABNORMAL
VIT B12 BLD-MCNC: 456 PG/ML (ref 211–946)
VLDLC SERPL-MCNC: 73 MG/DL (ref 5–40)
WBC # UR STRIP: ABNORMAL /HPF
WBC NRBC COR # BLD AUTO: 7.91 10*3/MM3 (ref 3.4–10.8)

## 2024-11-14 PROCEDURE — 84443 ASSAY THYROID STIM HORMONE: CPT

## 2024-11-14 PROCEDURE — 81001 URINALYSIS AUTO W/SCOPE: CPT

## 2024-11-14 PROCEDURE — 82306 VITAMIN D 25 HYDROXY: CPT

## 2024-11-14 PROCEDURE — 80074 ACUTE HEPATITIS PANEL: CPT

## 2024-11-14 PROCEDURE — 83036 HEMOGLOBIN GLYCOSYLATED A1C: CPT

## 2024-11-14 PROCEDURE — 82607 VITAMIN B-12: CPT

## 2024-11-14 PROCEDURE — 80053 COMPREHEN METABOLIC PANEL: CPT

## 2024-11-14 PROCEDURE — 85025 COMPLETE CBC W/AUTO DIFF WBC: CPT

## 2024-11-14 PROCEDURE — 80061 LIPID PANEL: CPT

## 2024-11-14 PROCEDURE — 87086 URINE CULTURE/COLONY COUNT: CPT

## 2024-11-15 LAB — BACTERIA SPEC AEROBE CULT: NORMAL

## 2024-11-18 ENCOUNTER — OFFICE VISIT (OUTPATIENT)
Dept: FAMILY MEDICINE CLINIC | Facility: CLINIC | Age: 44
End: 2024-11-18
Payer: COMMERCIAL

## 2024-11-18 VITALS
TEMPERATURE: 98.2 F | SYSTOLIC BLOOD PRESSURE: 144 MMHG | DIASTOLIC BLOOD PRESSURE: 88 MMHG | RESPIRATION RATE: 22 BRPM | WEIGHT: 293 LBS | BODY MASS INDEX: 58.21 KG/M2 | HEART RATE: 84 BPM | OXYGEN SATURATION: 96 %

## 2024-11-18 DIAGNOSIS — D21.9 FIBROIDS: ICD-10-CM

## 2024-11-18 DIAGNOSIS — E66.01 MORBID OBESITY DUE TO EXCESS CALORIES: ICD-10-CM

## 2024-11-18 DIAGNOSIS — I10 HYPERTENSION, UNSPECIFIED TYPE: Primary | ICD-10-CM

## 2024-11-18 DIAGNOSIS — F41.9 ANXIETY: ICD-10-CM

## 2024-11-18 DIAGNOSIS — E55.9 VITAMIN D DEFICIENCY: ICD-10-CM

## 2024-11-18 PROCEDURE — 99214 OFFICE O/P EST MOD 30 MIN: CPT | Performed by: FAMILY MEDICINE

## 2024-11-18 RX ORDER — ERGOCALCIFEROL 1.25 MG/1
50000 CAPSULE, LIQUID FILLED ORAL WEEKLY
Qty: 12 CAPSULE | Refills: 3 | Status: SHIPPED | OUTPATIENT
Start: 2024-11-18

## 2024-11-18 RX ORDER — FLUOXETINE 40 MG/1
40 CAPSULE ORAL DAILY
Qty: 90 CAPSULE | Refills: 3 | Status: SHIPPED | OUTPATIENT
Start: 2024-11-18

## 2024-11-18 RX ORDER — METOPROLOL TARTRATE 25 MG/1
25 TABLET, FILM COATED ORAL 2 TIMES DAILY
Qty: 180 TABLET | Refills: 1 | Status: SHIPPED | OUTPATIENT
Start: 2024-11-18

## 2024-11-18 NOTE — PROGRESS NOTES
Subjective   Starr Ruiz is a 44 y.o. female.     History of Present Illness I have reviewed all the labs that she had 4 days ago.  All of her labs look normal with the exception of a Persistently low vitamin D level.  She reports in general she just not feeling well she has been more stressed on her life she has gained weight she feels shaky and nervous and her blood pressure has been more elevated recently.  She has been on Lexapro for quite some time she does remember taking Prozac in the past does not remember if she did well or had any issues with that.  We reviewed all of her labs.  Will start her on some vitamin D.    The following portions of the patient's history were reviewed and updated as appropriate: allergies, current medications, past family history, past medical history, past social history, past surgical history, and problem list.    Review of Systems   Constitutional:  Positive for fatigue and unexpected weight change. Negative for chills and fever.   HENT:  Negative for congestion, ear pain, nosebleeds, rhinorrhea, sinus pressure, sneezing, sore throat and trouble swallowing.    Eyes:  Negative for itching and visual disturbance.   Respiratory:  Negative for cough, chest tightness, shortness of breath and wheezing.    Cardiovascular:  Negative for chest pain, palpitations and leg swelling.   Gastrointestinal:  Negative for abdominal pain, anal bleeding, blood in stool, constipation, diarrhea, nausea and vomiting.   Endocrine: Negative for cold intolerance, heat intolerance, polydipsia and polyuria.   Genitourinary:  Negative for difficulty urinating, frequency, hematuria and urgency.   Musculoskeletal:  Negative for back pain, gait problem and myalgias.   Skin:  Negative for rash and wound.   Neurological:  Positive for light-headedness. Negative for dizziness, weakness, numbness and headaches.   Hematological:  Negative for adenopathy. Does not bruise/bleed easily.    Psychiatric/Behavioral:  Negative for agitation, confusion, decreased concentration and suicidal ideas. The patient is not nervous/anxious.        Objective     Vitals:    11/18/24 1134 11/18/24 1138   BP: 150/90 144/88   BP Location: Left arm Left arm   Patient Position: Sitting Sitting   Cuff Size: Large Adult Large Adult   Pulse: 84    Resp: 22    Temp: 98.2 °F (36.8 °C)    TempSrc: Infrared    SpO2: 96%    Weight: (!) 179 kg (394 lb 3.2 oz)        Physical Exam  Vitals and nursing note reviewed.   Constitutional:       General: She is not in acute distress.     Appearance: She is well-developed. She is not diaphoretic.   HENT:      Head: Normocephalic and atraumatic.      Right Ear: External ear normal.      Left Ear: External ear normal.   Eyes:      General: Lids are normal. No scleral icterus.        Right eye: No discharge.         Left eye: No discharge.      Conjunctiva/sclera: Conjunctivae normal.      Pupils: Pupils are equal, round, and reactive to light.   Neck:      Thyroid: No thyroid mass or thyromegaly.      Vascular: No carotid bruit or JVD.      Trachea: No tracheal deviation.   Cardiovascular:      Rate and Rhythm: Normal rate and regular rhythm.      Heart sounds: Normal heart sounds. No murmur heard.     No friction rub. No gallop.   Pulmonary:      Effort: Pulmonary effort is normal. No respiratory distress.      Breath sounds: Normal breath sounds. No decreased breath sounds, wheezing, rhonchi or rales.   Chest:      Chest wall: No tenderness.   Abdominal:      General: Bowel sounds are normal. There is no distension.      Palpations: Abdomen is soft. There is no mass.      Tenderness: There is no abdominal tenderness. There is no guarding or rebound.      Hernia: No hernia is present.   Musculoskeletal:         General: Normal range of motion.   Lymphadenopathy:      Cervical: No cervical adenopathy.      Upper Body:      Right upper body: No supraclavicular adenopathy.      Left upper  body: No supraclavicular adenopathy.   Skin:     Findings: No bruising, erythema or rash.      Nails: There is no clubbing.   Neurological:      Mental Status: She is alert and oriented to person, place, and time.      Cranial Nerves: No cranial nerve deficit.      Deep Tendon Reflexes: Reflexes are normal and symmetric. Reflexes normal.   Psychiatric:         Speech: Speech normal.         Behavior: Behavior normal.         Thought Content: Thought content normal.         Judgment: Judgment normal.         Assessment & Plan     Problem List Items Addressed This Visit          Mental Health    Anxiety    Relevant Medications    FLUoxetine (PROzac) 40 MG capsule     Other Visit Diagnoses       Hypertension, unspecified type    -  Primary    Relevant Medications    metoprolol tartrate (LOPRESSOR) 25 MG tablet    Morbid obesity due to excess calories        Relevant Orders    Ambulatory Referral to Weight Management Program    Vitamin D deficiency        Relevant Medications    vitamin D (ERGOCALCIFEROL) 1.25 MG (48964 UT) capsule capsule    Fibroids        Relevant Orders    Ambulatory Referral to Obstetrics / Gynecology          We have reviewed all of her recent labs.  Her hemoglobin A1c looks good we discussed her triglyceride levels and her cholesterol levels.  Will get her on some vitamin D supplementation.  I will refer her to the bariatric weight loss clinic.  I have also placed a referral for gynecology.  Will start her on metoprolol for blood pressure 25 mg twice daily follow-up in 1 month.  Will also change her from Lexapro over to Prozac.  She was on 20 mg of Lexapro so we will do 40 mg of Prozac.

## 2024-11-30 DIAGNOSIS — I10 ESSENTIAL HYPERTENSION: Chronic | ICD-10-CM

## 2024-12-02 RX ORDER — LOSARTAN POTASSIUM 100 MG/1
100 TABLET ORAL DAILY
Qty: 30 TABLET | Refills: 0 | Status: SHIPPED | OUTPATIENT
Start: 2024-12-02

## 2024-12-13 ENCOUNTER — OFFICE VISIT (OUTPATIENT)
Dept: OBSTETRICS AND GYNECOLOGY | Facility: CLINIC | Age: 44
End: 2024-12-13
Payer: COMMERCIAL

## 2024-12-13 VITALS
BODY MASS INDEX: 58.04 KG/M2 | DIASTOLIC BLOOD PRESSURE: 82 MMHG | WEIGHT: 293 LBS | SYSTOLIC BLOOD PRESSURE: 138 MMHG | RESPIRATION RATE: 14 BRPM

## 2024-12-13 DIAGNOSIS — N91.2 AMENORRHEA: ICD-10-CM

## 2024-12-13 DIAGNOSIS — Z86.018 HISTORY OF UTERINE FIBROID: ICD-10-CM

## 2024-12-13 DIAGNOSIS — R10.32 ABDOMINAL PAIN, LLQ: Primary | ICD-10-CM

## 2024-12-13 PROBLEM — U07.1 COVID-19: Status: RESOLVED | Noted: 2022-08-15 | Resolved: 2024-12-13

## 2024-12-13 PROBLEM — R92.8 ABNORMAL MAMMOGRAM: Status: ACTIVE | Noted: 2021-05-01

## 2024-12-13 PROBLEM — N83.201 BILATERAL OVARIAN CYSTS: Status: RESOLVED | Noted: 2024-02-21 | Resolved: 2024-12-13

## 2024-12-13 PROBLEM — N83.202 BILATERAL OVARIAN CYSTS: Status: RESOLVED | Noted: 2024-02-21 | Resolved: 2024-12-13

## 2024-12-13 PROBLEM — F17.210 CIGARETTE SMOKER: Status: RESOLVED | Noted: 2019-01-14 | Resolved: 2024-12-13

## 2024-12-13 PROBLEM — F41.9 ANXIETY: Status: RESOLVED | Noted: 2024-02-21 | Resolved: 2024-12-13

## 2024-12-13 PROCEDURE — 99214 OFFICE O/P EST MOD 30 MIN: CPT | Performed by: OBSTETRICS & GYNECOLOGY

## 2024-12-13 NOTE — PROGRESS NOTES
Subjective   Chief Complaint   Patient presents with    Pelvic Pain       Starr Ruiz is a 44 y.o. year old .  No LMP recorded. Patient has had an ablation.  She comes to talk about issues with pelvic pain that she thinks might need hysterectomy.  She has concerns about fibroids and ovarian cyst.  She was last imaged back in 2022.  Ovaries were not visualized.  She did have a uterine fibroid.  She does have a history of heavy bleeding historically for which an endometrial ablation was done about 7 years ago.  Since that time she has had no bleeding at all.  She is up-to-date on cervical cancer screenings having had a Pap smear done in 2022.  It was normal and HPV cotesting was done as well.    The pain she describes above the pubic bone more left-sided than right.  She was here for a similar issue couple years ago at which point she described the pain at that time more as right sided.  She has never had a colonoscopy.  Her stool is soft and usually not very formed.  There is no blood in the stool.  She eats 2 meals a day and most all of her eating is fast food.  She has gained nearly 100 pounds over the last couple of years.    Reviewing her history she does have a BI-RADS 4 mammogram back 3 years ago that has not been followed up.  Part of this relates to the lack of health insurance which she now has remedied.    The following portions of the patient's history were reviewed and updated as appropriate:current medications, allergies, past family history, past medical history, past social history, and past surgical history    Social History    Tobacco Use      Smoking status: Every Day        Packs/day: 1.00        Years: 1 pack/day for 29.9 years (29.9 ttl pk-yrs)        Types: Cigarettes        Start date:         Passive exposure: Current      Smokeless tobacco: Never      Tobacco comments: Quitting. Has not had a cig since 2024. Now vaping          Objective    /82   Resp 14   Wt (!) 178 kg (393 lb)   BMI 58.04 kg/m²     Lab Review   Pap smear    Imaging   Prior ultrasound       Assessment   Pelvic pain highly unlikely to be gynecologic.  Strongly suggest this to be either colorectal in origin given increasing weight in her diet or potentially muscular.  Uterine fibroids probably incidental  BI-RADS 4 mammogram not yet followed up  Heavy tobacco use, longstanding     Plan   Ultrasound needs to be done any time that is convenient for her.   The importance of keeping all planned follow-up and taking all medications as prescribed was emphasized.  Follow up after ultrasound    Will most likely need colonoscopy as well as part of the workup  I once again made her aware of the need for follow-up mammography.  Primary care has been working on trying to get this arranged.  I strongly encouraged her to go through that process as I think this is a bigger risk to her health at the current time and any issues with pelvic pain           This note was electronically signed.    Zurdo Vasquez M.D.  December 13, 2024      Part of this note may be an electronic transcription/translation of spoken language to printed text using the Dragon Dictation System.

## 2025-01-06 DIAGNOSIS — I10 ESSENTIAL HYPERTENSION: Chronic | ICD-10-CM

## 2025-01-06 DIAGNOSIS — G47.00 INSOMNIA, UNSPECIFIED TYPE: ICD-10-CM

## 2025-01-07 RX ORDER — LOSARTAN POTASSIUM 100 MG/1
100 TABLET ORAL DAILY
Qty: 30 TABLET | Refills: 0 | Status: SHIPPED | OUTPATIENT
Start: 2025-01-07

## 2025-01-07 RX ORDER — TRAZODONE HYDROCHLORIDE 50 MG/1
TABLET, FILM COATED ORAL
Qty: 90 TABLET | Refills: 0 | Status: SHIPPED | OUTPATIENT
Start: 2025-01-07

## 2025-02-12 ENCOUNTER — TELEMEDICINE (OUTPATIENT)
Dept: FAMILY MEDICINE CLINIC | Facility: TELEHEALTH | Age: 45
End: 2025-02-12
Payer: COMMERCIAL

## 2025-02-12 DIAGNOSIS — H92.02 OTALGIA OF LEFT EAR: ICD-10-CM

## 2025-02-12 DIAGNOSIS — J03.90 TONSILLITIS: Primary | ICD-10-CM

## 2025-02-12 PROCEDURE — 99213 OFFICE O/P EST LOW 20 MIN: CPT | Performed by: NURSE PRACTITIONER

## 2025-02-12 RX ORDER — ALBUTEROL SULFATE 90 UG/1
2 INHALANT RESPIRATORY (INHALATION) EVERY 4 HOURS PRN
Qty: 18 G | Refills: 0 | Status: SHIPPED | OUTPATIENT
Start: 2025-02-12 | End: 2025-03-14

## 2025-02-12 RX ORDER — PREDNISONE 20 MG/1
20 TABLET ORAL 2 TIMES DAILY
Qty: 10 TABLET | Refills: 0 | Status: SHIPPED | OUTPATIENT
Start: 2025-02-12 | End: 2025-02-17

## 2025-02-12 RX ORDER — AMOXICILLIN 400 MG/5ML
500 POWDER, FOR SUSPENSION ORAL 2 TIMES DAILY
Qty: 126 ML | Refills: 0 | Status: SHIPPED | OUTPATIENT
Start: 2025-02-12 | End: 2025-02-22

## 2025-02-12 NOTE — PROGRESS NOTES
You have chosen to receive care through a telehealth visit.  Do you consent to use a video/audio connection for your medical care today? Yes     CHIEF COMPLAINT  Chief Complaint   Patient presents with    Earache         HPI  Starr Ruiz is a 44 y.o. female  presents with complaint of throat sore, earache and lymph nodes. Reports symptoms started yesterday. Reports no fever or chills. No nausea or vomiting. Reports no CP or SOA. Reports the left lymph node is swollen. Reports she has taken IBu for her symptoms.     Review of Systems   Constitutional:  Negative for chills, fatigue and fever.   HENT:  Positive for ear pain and sore throat. Negative for congestion, ear discharge, sinus pressure and sinus pain.    Respiratory:  Negative for cough, chest tightness, shortness of breath and wheezing.    Cardiovascular:  Negative for chest pain.   Gastrointestinal:  Negative for abdominal pain, diarrhea, nausea and vomiting.   Musculoskeletal:  Negative for back pain and myalgias.   Neurological:  Negative for dizziness and headaches.   Psychiatric/Behavioral: Negative.         Past Medical History:   Diagnosis Date    COVID 01/2022    COVID 08/2022    Depression with anxiety 1998    Essential hypertension 1998    H/O chlamydia infection 2022    H/O chlamydia infection 1999    H/O trichomoniasis 2022    H/O trichomoniasis 1999    History of chlamydia 2021    RLS (restless legs syndrome) 2010    Uterine fibroid 2016       Family History   Problem Relation Age of Onset    No Known Problems Mother     Sleep disorder Father     Emphysema Father     COPD Father     Hypertension Father     Breast cancer Other         age unknown     Endometrial cancer Neg Hx     Ovarian cancer Neg Hx        Social History     Socioeconomic History    Marital status: Single   Tobacco Use    Smoking status: Every Day     Current packs/day: 1.00     Average packs/day: 1 pack/day for 30.1 years (30.1 ttl pk-yrs)     Types: Cigarettes      Start date: 1995     Passive exposure: Current    Smokeless tobacco: Never    Tobacco comments:     Quitting. Has not had a cig since February 26, 2024. Now vaping    Vaping Use    Vaping status: Every Day    Substances: Nicotine, Flavoring    Passive vaping exposure: Yes   Substance and Sexual Activity    Alcohol use: Not Currently     Comment: rare    Drug use: Yes     Types: Marijuana     Comment: Last used ~ 2008    Sexual activity: Yes     Partners: Male     Birth control/protection: Surgical, None       Starr Ruiz  reports that she has been smoking cigarettes. She started smoking about 30 years ago. She has a 30.1 pack-year smoking history. She has been exposed to tobacco smoke. She has never used smokeless tobacco. I have educated her on the risk of diseases from using tobacco products such as cancer, COPD, and heart disease.     I advised her to quit and she is willing to quit. We have discussed the following method/s for tobacco cessation:  Counseling.  She will follow up with her PCP when she is ready to stop smoking. Advised to stop vaping     I spent  1  minutes counseling the patient.              Breastfeeding No     PHYSICAL EXAM  Physical Exam   Constitutional: She is oriented to person, place, and time. She appears well-developed and well-nourished. No distress.   HENT:   Head: Normocephalic and atraumatic.   Right Ear: Hearing normal. No drainage.   Left Ear: Hearing normal. No drainage.   Nose: No congestion. Right sinus exhibits no maxillary sinus tenderness. Left sinus exhibits no maxillary sinus tenderness.   Mouth/Throat: Mouth/Lips are normal.Oropharyngeal exudate present.   Patient directed exam    Eyes: Conjunctivae and lids are normal.   Pulmonary/Chest: Effort normal.  No respiratory distress.  Lymphadenopathy:        Right cervical: No anterior cervical adenopathy present.       Left cervical: Anterior cervical adenopathy present.   Patient directed exam    Neurological:  She is alert and oriented to person, place, and time.   Psychiatric: She has a normal mood and affect. Her speech is normal and behavior is normal.           Diagnoses and all orders for this visit:    1. Tonsillitis (Primary)    2. Otalgia of left ear    Other orders  -     albuterol sulfate  (90 Base) MCG/ACT inhaler; Inhale 2 puffs Every 4 (Four) Hours As Needed for Wheezing or Shortness of Air for up to 30 days.  Dispense: 18 g; Refill: 0  -     amoxicillin (AMOXIL) 400 MG/5ML suspension; Take 6.3 mL by mouth 2 (Two) Times a Day for 10 days.  Dispense: 126 mL; Refill: 0  -     predniSONE (DELTASONE) 20 MG tablet; Take 1 tablet by mouth 2 (Two) Times a Day for 5 days.  Dispense: 10 tablet; Refill: 0    Rest  Fluids  PCP if symptoms persist   ER for worsening symptoms such as high fever, chest pain or SOA       FOLLOW-UP  As discussed during visit with PCP/[facility] if no improvement or Urgent Care/Emergency Department if worsening of symptoms    Patient verbalizes understanding of medication dosage, comfort measures, instructions for treatment and follow-up.    Jailene Tompkins, APRN  02/12/2025  05:53 EST    Mode of Visit: Video  Location of patient: -HOME-  Location of provider: +HOME+  You have chosen to receive care through a telehealth visit.  The patient has signed the video visit consent form.  The visit included audio and video interaction. No technical issues occurred during this visit.      The use of a video visit has been reviewed with the patient and verbal informed consent has been obtained. Myself and Starr Ruiz participated in this visit. The patient is located in [address] [place] [address].   I am located in [place], [place]. [employer] and [employer] were utilized. I spent 5 minutes in the patient's chart for this visit.         Note Disclaimer: At [facility], we believe that sharing information builds trust and better   relationships. You are receiving this  note because you recently visited [facility]. It is possible you   will see health information before a provider has talked with you about it. This kind of information can   be easy to misunderstand. To help you fully understand what it means for your health, we urge you to   discuss this note with your provider.

## 2025-02-23 DIAGNOSIS — G47.00 INSOMNIA, UNSPECIFIED TYPE: ICD-10-CM

## 2025-02-24 RX ORDER — TRAZODONE HYDROCHLORIDE 50 MG/1
TABLET ORAL
Qty: 90 TABLET | Refills: 0 | Status: SHIPPED | OUTPATIENT
Start: 2025-02-24

## 2025-03-17 DIAGNOSIS — I10 ESSENTIAL HYPERTENSION: Chronic | ICD-10-CM

## 2025-03-18 RX ORDER — LOSARTAN POTASSIUM 100 MG/1
100 TABLET ORAL DAILY
Qty: 30 TABLET | Refills: 0 | Status: SHIPPED | OUTPATIENT
Start: 2025-03-18

## 2025-04-12 DIAGNOSIS — G47.00 INSOMNIA, UNSPECIFIED TYPE: ICD-10-CM

## 2025-04-12 RX ORDER — TRAZODONE HYDROCHLORIDE 50 MG/1
TABLET ORAL
Qty: 90 TABLET | Refills: 0 | Status: SHIPPED | OUTPATIENT
Start: 2025-04-12

## 2025-04-13 DIAGNOSIS — I10 ESSENTIAL HYPERTENSION: Chronic | ICD-10-CM

## 2025-04-13 DIAGNOSIS — I10 HYPERTENSION, UNSPECIFIED TYPE: ICD-10-CM

## 2025-04-14 RX ORDER — LOSARTAN POTASSIUM 100 MG/1
100 TABLET ORAL DAILY
Qty: 90 TABLET | Refills: 0 | Status: SHIPPED | OUTPATIENT
Start: 2025-04-14

## 2025-04-14 RX ORDER — METOPROLOL TARTRATE 25 MG/1
25 TABLET, FILM COATED ORAL 2 TIMES DAILY
Qty: 180 TABLET | Refills: 0 | Status: SHIPPED | OUTPATIENT
Start: 2025-04-14

## 2025-04-20 NOTE — PROGRESS NOTES
"Subjective   Starr Ruiz is a 36 y.o. female.     History of Present Illness Pt states that she feels anxious all the time.  She has had to leave multiple places due to inability to control emotions and SOB.  Feels out of control and agitated.  This is a daily occurrence and says she just wants to be left alone.  A lot of social issues with kids at the moment ages 12, 13, 15.  15 year old female just left [place] for behavioral issues and aggression toward mom.  Other with behavioral issues at school.  Sleeps 3-4 hours at night. Brother just sentenced to 30 years in shelter.  At least one headache 8/10 daily lasting approximately 2-3 hours.  Noise and light make it worse.  Tylenol 1000 mg does not help.  Has been off BP meds for 3 months.  Dr Giron performed Novasure in March 2016.  He was managing BP prior to surgery, but pt unable to fu there due to insurance coverage changing.  Vaginal bleeding has resolved, but cramping during cycle is \"unbearable\".  Has taken hydrocodone, naproxen, and other left over medications from previous surgeries but has no medication left for pain control.  Feels like anxiety is worse during her cycle.  Smoking more frequently.  Has Ventolin inhaler she takes for occ SOB.  Weight gain due to overeating with stress.      {Common H&P Review Areas:17629}    Review of Systems   Constitutional: Positive for appetite change. Negative for activity change, chills, diaphoresis, fatigue, fever and unexpected weight change.   HENT: Negative.  Negative for congestion, dental problem, drooling, ear discharge, ear pain, facial swelling, hearing loss, mouth sores, nosebleeds, postnasal drip, rhinorrhea, sinus pressure, sneezing, sore throat, tinnitus, trouble swallowing and voice change.    Eyes: Negative.    Respiratory: Negative.    Cardiovascular: Negative.  Negative for chest pain, palpitations and leg swelling.   Gastrointestinal: Negative.    Endocrine: Positive for polyuria. Negative " "for cold intolerance, heat intolerance, polydipsia and polyphagia.   Genitourinary: Negative.    Musculoskeletal: Negative.    Skin: Negative.    Allergic/Immunologic: Negative.    Neurological: Negative.    Hematological: Negative.    Psychiatric/Behavioral: Positive for agitation, dysphoric mood, self-injury and sleep disturbance. Negative for behavioral problems, confusion, decreased concentration, hallucinations and suicidal ideas. The patient is nervous/anxious. The patient is not hyperactive.    All other systems reviewed and are negative.      Objective     Vitals:    02/27/17 1424   BP: (!) 170/110   Pulse: 88   SpO2: 95%   Weight: (!) 356 lb (161 kg)   Height: 68\" (172.7 cm)       Physical Exam   Constitutional: She is oriented to person, place, and time. She appears well-developed and well-nourished.   HENT:   Head: Normocephalic and atraumatic.   Eyes: EOM are normal. Pupils are equal, round, and reactive to light. Right eye exhibits no discharge. Left eye exhibits no discharge.   Neck: Normal range of motion. Neck supple.   Cardiovascular: Normal rate, regular rhythm, normal heart sounds and intact distal pulses.    Pulmonary/Chest: Effort normal and breath sounds normal.   Abdominal: Soft. Bowel sounds are normal. She exhibits no mass. There is no tenderness.   Musculoskeletal: Normal range of motion.        Right shoulder: She exhibits no swelling.   Neurological: She is alert and oriented to person, place, and time. She has normal reflexes.   Skin: Skin is warm and dry. No cyanosis. Nails show no clubbing.   Psychiatric: She has a normal mood and affect. Her behavior is normal. Judgment and thought content normal.       Assessment/Plan     Problem List Items Addressed This Visit     None          " No/Not applicable

## 2025-05-13 DIAGNOSIS — G47.00 INSOMNIA, UNSPECIFIED TYPE: ICD-10-CM

## 2025-05-13 RX ORDER — TRAZODONE HYDROCHLORIDE 50 MG/1
TABLET ORAL
Qty: 90 TABLET | Refills: 0 | Status: SHIPPED | OUTPATIENT
Start: 2025-05-13

## 2025-05-20 DIAGNOSIS — I10 ESSENTIAL HYPERTENSION: Chronic | ICD-10-CM

## 2025-05-21 RX ORDER — LOSARTAN POTASSIUM 100 MG/1
100 TABLET ORAL DAILY
Qty: 90 TABLET | Refills: 0 | Status: SHIPPED | OUTPATIENT
Start: 2025-05-21

## 2025-06-14 DIAGNOSIS — G47.00 INSOMNIA, UNSPECIFIED TYPE: ICD-10-CM

## 2025-06-16 RX ORDER — TRAZODONE HYDROCHLORIDE 50 MG/1
TABLET ORAL
Qty: 90 TABLET | Refills: 0 | Status: SHIPPED | OUTPATIENT
Start: 2025-06-16

## 2025-07-31 ENCOUNTER — TELEPHONE (OUTPATIENT)
Dept: FAMILY MEDICINE CLINIC | Facility: CLINIC | Age: 45
End: 2025-07-31
Payer: COMMERCIAL

## 2025-07-31 NOTE — TELEPHONE ENCOUNTER
Patient called in and said she needs a diagnostic mammogram because she had one back on 2021 and never went back. Please place an order and let the patient know. Also if we are able to send her out in [place] she would prefer that.

## 2025-08-04 DIAGNOSIS — Z87.898 HISTORY OF ABNORMAL MAMMOGRAM: Primary | ICD-10-CM

## 2025-08-13 DIAGNOSIS — I10 HYPERTENSION, UNSPECIFIED TYPE: ICD-10-CM

## 2025-08-13 RX ORDER — METOPROLOL TARTRATE 25 MG/1
25 TABLET, FILM COATED ORAL 2 TIMES DAILY
Qty: 180 TABLET | Refills: 0 | Status: SHIPPED | OUTPATIENT
Start: 2025-08-13

## 2025-08-19 LAB
NCCN CRITERIA FLAG: NORMAL
TYRER CUZICK SCORE: 7.9

## (undated) DEVICE — KT WARM LAP LIQUIDSCOPE/HELPOR W/WARMOR/CLTH 2/TROC/SWAB

## (undated) DEVICE — SUT MNCRYL PLS ANTIB UD 4/0 PS2 18IN

## (undated) DEVICE — PK LAP LASR CHOLE 10

## (undated) DEVICE — SUT SILK 2/0 TIES 18IN A185H

## (undated) DEVICE — MINI ENDOCUT SCISSOR TIP, DISPOSABLE: Brand: RENEW

## (undated) DEVICE — ENDOPATH XCEL UNIVERSAL TROCAR STABLILITY SLEEVES: Brand: ENDOPATH XCEL

## (undated) DEVICE — ENDOPATH XCEL BLADELESS TROCARS WITH STABILITY SLEEVES: Brand: ENDOPATH XCEL

## (undated) DEVICE — ADHS LIQ MASTISOL 2/3ML

## (undated) DEVICE — GLV SURG BIOGEL LTX PF 7

## (undated) DEVICE — ENDOPATH XCEL BLUNT TIP TROCARS WITH SMOOTH SLEEVES: Brand: ENDOPATH XCEL

## (undated) DEVICE — APPL CHLORAPREP W/TINT 26ML BLU

## (undated) DEVICE — LAPAROSCOPIC SCISSORS: Brand: EPIX LAPAROSCOPIC SCISSORS

## (undated) DEVICE — PATIENT RETURN ELECTRODE, SINGLE-USE, CONTACT QUALITY MONITORING, ADULT, WITH 9FT CORD, FOR PATIENTS WEIGING OVER 33LBS. (15KG): Brand: MEGADYNE

## (undated) DEVICE — LAPAROSCOPIC SMOKE FILTRATION SYSTEM: Brand: PALL LAPAROSHIELD® PLUS LAPAROSCOPIC SMOKE FILTRATION SYSTEM

## (undated) DEVICE — UNDERGLV SURG BIOGEL INDICATOR LF PF 7.5

## (undated) DEVICE — SUT VIC 0 UR6 27IN VCP603H

## (undated) DEVICE — ENDOPOUCH RETRIEVER SPECIMEN RETRIEVAL BAGS: Brand: ENDOPOUCH RETRIEVER

## (undated) DEVICE — [HIGH FLOW INSUFFLATOR,  DO NOT USE IF PACKAGE IS DAMAGED,  KEEP DRY,  KEEP AWAY FROM SUNLIGHT,  PROTECT FROM HEAT AND RADIOACTIVE SOURCES.]: Brand: PNEUMOSURE